# Patient Record
Sex: FEMALE | Race: WHITE | NOT HISPANIC OR LATINO | Employment: OTHER | URBAN - METROPOLITAN AREA
[De-identification: names, ages, dates, MRNs, and addresses within clinical notes are randomized per-mention and may not be internally consistent; named-entity substitution may affect disease eponyms.]

---

## 2017-10-30 PROBLEM — I48.91 ATRIAL FIBRILLATION AND FLUTTER (CMS/HCC): Status: ACTIVE | Noted: 2017-10-30

## 2017-10-30 PROBLEM — I63.9 STROKE (CMS/HCC): Status: ACTIVE | Noted: 2017-10-30

## 2017-10-30 PROBLEM — I73.9 PVD (PERIPHERAL VASCULAR DISEASE) (CMS/HCC): Status: ACTIVE | Noted: 2017-10-30

## 2017-10-30 PROBLEM — I10 HYPERTENSION: Status: ACTIVE | Noted: 2017-10-30

## 2017-10-30 PROBLEM — Z72.0 TOBACCO USE: Status: ACTIVE | Noted: 2017-10-30

## 2017-10-30 PROBLEM — I48.92 ATRIAL FIBRILLATION AND FLUTTER (CMS/HCC): Status: ACTIVE | Noted: 2017-10-30

## 2018-01-04 ENCOUNTER — ANCILLARY PROCEDURE (OUTPATIENT)
Dept: RADIOLOGY | Facility: URGENT CARE | Age: 78
End: 2018-01-04
Payer: MEDICARE

## 2018-01-04 ENCOUNTER — OFFICE VISIT (OUTPATIENT)
Dept: URGENT CARE | Facility: URGENT CARE | Age: 78
End: 2018-01-04
Payer: MEDICARE

## 2018-01-04 VITALS
WEIGHT: 172.5 LBS | OXYGEN SATURATION: 98 % | HEART RATE: 64 BPM | SYSTOLIC BLOOD PRESSURE: 144 MMHG | HEIGHT: 62 IN | RESPIRATION RATE: 16 BRPM | BODY MASS INDEX: 31.74 KG/M2 | DIASTOLIC BLOOD PRESSURE: 90 MMHG

## 2018-01-04 DIAGNOSIS — M54.6 ACUTE LEFT-SIDED THORACIC BACK PAIN: Primary | ICD-10-CM

## 2018-01-04 PROCEDURE — 99202 OFFICE O/P NEW SF 15 MIN: CPT | Mod: PO | Performed by: PHYSICIAN ASSISTANT

## 2018-01-04 PROCEDURE — 99202 OFFICE O/P NEW SF 15 MIN: CPT | Performed by: PHYSICIAN ASSISTANT

## 2018-01-04 PROCEDURE — 72072 X-RAY EXAM THORAC SPINE 3VWS: CPT | Mod: 59

## 2018-01-04 PROCEDURE — 71101 X-RAY EXAM UNILAT RIBS/CHEST: CPT | Mod: LT,PO,FY

## 2018-01-04 RX ORDER — DEXLANSOPRAZOLE 30 MG/1
30 CAPSULE, DELAYED RELEASE ORAL DAILY
COMMUNITY
Start: 2017-06-22 | End: 2020-05-15 | Stop reason: ALTCHOICE

## 2018-01-04 RX ORDER — METOPROLOL SUCCINATE 50 MG/1
50 TABLET, EXTENDED RELEASE ORAL
Refills: 3 | COMMUNITY
Start: 2017-12-08 | End: 2018-03-27 | Stop reason: ALTCHOICE

## 2018-01-04 RX ORDER — CLOPIDOGREL BISULFATE 75 MG/1
TABLET ORAL EVERY 24 HOURS
COMMUNITY
Start: 2017-06-22 | End: 2018-03-26

## 2018-01-04 ASSESSMENT — PAIN SCALES - GENERAL: PAINLEVEL: 8

## 2018-01-04 NOTE — PROGRESS NOTES
"Subjective      HPI    Belkis Burris is a 77 y.o. female who presents for left thoracic back pain after falling on a step 2 days ago.  She reports she slipped while on the step and fell backwards against another step.  She denies feeling dizzy, lightheaded, or hitting her head.  She denies any loss of consciousness.  The pain is located just below the inferior angle of her left scapula.  She has been using ibuprofen with relief.  She denies any shortness of breath or pain when taking a deep breath.      Review of Systems    As noted in HPI.      Objective   /90 (BP Location: Left arm, Patient Position: Sitting, Cuff Size: Reg)   Pulse 64   Resp 16   Ht 1.562 m (5' 1.5\")   Wt 78.2 kg (172 lb 8 oz)   SpO2 98%   BMI 32.07 kg/m²     Physical Exam   Constitutional: She is oriented to person, place, and time. She appears well-developed and well-nourished. No distress.   HENT:   Head: Normocephalic and atraumatic.   Eyes: Pupils are equal, round, and reactive to light.   Neck: Normal range of motion.   Musculoskeletal: Normal range of motion.        Thoracic back: She exhibits tenderness and pain. She exhibits normal range of motion, no swelling, no edema, no laceration and no spasm.   Neurological: She is alert and oriented to person, place, and time. She has normal strength. No sensory deficit.   Skin: Skin is warm and dry. Capillary refill takes less than 2 seconds. No bruising and no rash noted. She is not diaphoretic.   Psychiatric: She has a normal mood and affect. Her behavior is normal. Judgment and thought content normal.       No results found for this or any previous visit (from the past 4 hour(s)).    X-ray Ribs With Pa Chest Left    Result Date: 1/4/2018  Exam: Right rib series with single view chest from 01/04/2018    Clinical History: Acute left-sided thoracic back pain; Patient fell backwards onto his step pain left paraspinous at approximately rib 9. Procedure/Views: Right ribs 2 views with " single view chest Comparison(s): None Findings: A marker is placed in the region of interest. No definite displaced fracture. Mild cardiomegaly. No pneumothorax. Opacity at the left costophrenic sulcus, which may represent atelectasis.     No definite displaced fracture.    X-ray Thoracic Spine 3 Views    Result Date: 1/4/2018  Thoracic spine series 3 views from 01/04/2018. Clinical history: Acute left-sided thoracic back pain; Patient fell backwards onto his step pain left paraspinous at approximately rib 9.    Comparison: A chest x-ray from 5/30/2017 Findings: The bones appear osteopenic. No thoracic vertebral body fractures are identified. There is a sharp scoliotic spine deformity of the thoracic spine convex rightward of the thoracic inlet with a compensatory convex leftward spinal curvature. Left-sided vascular stent is seen in the mid neck. No significant arthritis. Soft tissues appear grossly normal.     1. Advanced scoliotic deformity of the upper thoracic spine. No definite acute fracture seen.        Assessment/Plan   Diagnoses and all orders for this visit:    Acute left-sided thoracic back pain  -     X-ray ribs with PA chest left  -     X-ray thoracic spine 3 views        Discussion:   Will order a chest x-ray with left rib detail as well as a thoracic spine x-ray.  Negative x-rays were discussed with patient as well.  Will have her discontinue the ibuprofen as she is on blood thinners.  Will have her use Tylenol, ice, and heat as needed for pain.  Expected course of this diagnosis discussed with pt. Pt will f/u in clinic prn persisting or worsening symptoms.  Pt verbalizes understanding and agrees with plan.       WILDER ALBARRAN

## 2018-01-06 ENCOUNTER — OFFICE VISIT (OUTPATIENT)
Dept: URGENT CARE | Facility: URGENT CARE | Age: 78
End: 2018-01-06
Payer: MEDICARE

## 2018-01-06 ENCOUNTER — TELEPHONE (OUTPATIENT)
Dept: URGENT CARE | Facility: URGENT CARE | Age: 78
End: 2018-01-06

## 2018-01-06 VITALS
DIASTOLIC BLOOD PRESSURE: 88 MMHG | HEART RATE: 60 BPM | SYSTOLIC BLOOD PRESSURE: 138 MMHG | RESPIRATION RATE: 16 BRPM | OXYGEN SATURATION: 95 % | TEMPERATURE: 97.4 F

## 2018-01-06 DIAGNOSIS — M54.6 THORACIC BACK PAIN, UNSPECIFIED BACK PAIN LATERALITY, UNSPECIFIED CHRONICITY: Primary | ICD-10-CM

## 2018-01-06 PROCEDURE — 99213 OFFICE O/P EST LOW 20 MIN: CPT | Mod: NC | Performed by: FAMILY MEDICINE

## 2018-01-06 RX ORDER — CYCLOBENZAPRINE HCL 5 MG
5 TABLET ORAL 3 TIMES DAILY PRN
Qty: 30 TABLET | Refills: 0 | Status: SHIPPED | OUTPATIENT
Start: 2018-01-06 | End: 2018-03-26

## 2018-01-06 ASSESSMENT — PAIN SCALES - GENERAL: PAINLEVEL: 10-WORST PAIN EVER

## 2018-01-06 NOTE — PROGRESS NOTES
Subjective      Belkis Burris is a 77 y.o. female who presents for back pain from recent fall..    HPI  Patient was recently seen and evaluated for possible rib fractures.  The patient's pain is on the left side but the report patient read indicated that the imaging was on the right side.  They are presenting for reevaluation.  Pain persists but nothing new from prior evaluation.  The following have been reviewed and updated as appropriate in this visit:  No text in SmartText        Review of Systems  Constitutional: Negative for activity change, fatigue and fever.   HENT: Negative for congestion.    Respiratory: Negative for cough and shortness of breath.    Gastrointestinal: Negative for constipation, diarrhea and nausea.   Psychiatric/Behavioral: Negative for agitation.     Objective   /88   Pulse 60   Temp 36.3 °C (97.4 °F) (Temporal)   Resp 16   SpO2 95%     Physical Exam  Constitutional: Patient is oriented to person, place, and time. Patient appears well-developed and well-nourished. Musculoskeletal: Normal range of motion.   Neurological: Patient is alert and oriented to person, place, and time.   Musculoskeletal: Patient is ambulating without pain.  She showed me her back with x-rays along the thoracic paraspinal muscles were the majority of the pain is.  Skin: Skin is warm and dry.   Psychiatric: Patient has a normal mood and affect. normal.   Assessment/Plan   Diagnoses and all orders for this visit:    Thoracic back pain, unspecified back pain laterality, unspecified chronicity  -     cyclobenzaprine (FLEXERIL) 5 mg tablet; Take 1 tablet (5 mg total) by mouth 3 (three) times a day as needed for muscle spasms for up to 10 days.  -     PT eval and treat      Medical decision making: In regards to the x-rays radiology technician and myself visited with the patient apologized for the mislabeled x-rays.  However the left side was imaged this was confirmed both from a radiographic standpoint and  from an order standpoint.  I reviewed the imaging with the patient as well as the radiologist report.  At this time we will start the patient on cyclobenzaprine and physical therapy for the pain.  I stated this will be a no charge visit as the reason for presentation was secondary to a mislabeled x-ray.  RAGHAV MARTINS MD

## 2018-01-06 NOTE — TELEPHONE ENCOUNTER
"Pt's daughter called reporting Belkis is worsening, reports she has been having more pain with all movement and popping. Daughter reports there is a lot a of \"popping\" they can hear with movement. Directed to RTC for reevaluation. Expressed understanding and agreement with POC.   "

## 2018-02-02 DIAGNOSIS — I48.91 ATRIAL FIBRILLATION, UNSPECIFIED TYPE (CMS/HCC): Primary | ICD-10-CM

## 2018-02-05 ENCOUNTER — ANCILLARY PROCEDURE (OUTPATIENT)
Dept: CARDIOLOGY | Facility: CLINIC | Age: 78
End: 2018-02-05
Payer: MEDICARE

## 2018-02-05 DIAGNOSIS — I48.91 ATRIAL FIBRILLATION, UNSPECIFIED TYPE (CMS/HCC): ICD-10-CM

## 2018-02-05 PROCEDURE — 93225 XTRNL ECG REC<48 HRS REC: CPT | Mod: PO

## 2018-02-05 PROCEDURE — 93227 XTRNL ECG REC<48 HR R&I: CPT | Performed by: INTERNAL MEDICINE

## 2018-03-14 ENCOUNTER — OFFICE VISIT (OUTPATIENT)
Dept: CARDIOLOGY | Facility: CLINIC | Age: 78
End: 2018-03-14
Payer: MEDICARE

## 2018-03-14 VITALS
DIASTOLIC BLOOD PRESSURE: 80 MMHG | SYSTOLIC BLOOD PRESSURE: 130 MMHG | BODY MASS INDEX: 32.24 KG/M2 | RESPIRATION RATE: 20 BRPM | HEIGHT: 62 IN | WEIGHT: 175.2 LBS

## 2018-03-14 DIAGNOSIS — I48.91 ATRIAL FIBRILLATION AND FLUTTER (CMS/HCC): Primary | ICD-10-CM

## 2018-03-14 DIAGNOSIS — R53.82 CHRONIC FATIGUE: ICD-10-CM

## 2018-03-14 DIAGNOSIS — I48.92 ATRIAL FIBRILLATION AND FLUTTER (CMS/HCC): Primary | ICD-10-CM

## 2018-03-14 DIAGNOSIS — I10 ESSENTIAL HYPERTENSION: ICD-10-CM

## 2018-03-14 DIAGNOSIS — I73.9 PVD (PERIPHERAL VASCULAR DISEASE) (CMS/HCC): ICD-10-CM

## 2018-03-14 PROCEDURE — 93010 ELECTROCARDIOGRAM REPORT: CPT | Performed by: INTERNAL MEDICINE

## 2018-03-14 PROCEDURE — 99214 OFFICE O/P EST MOD 30 MIN: CPT | Mod: PO | Performed by: NURSE PRACTITIONER

## 2018-03-14 PROCEDURE — 93005 ELECTROCARDIOGRAM TRACING: CPT | Mod: PO | Performed by: NURSE PRACTITIONER

## 2018-03-14 PROCEDURE — 99214 OFFICE O/P EST MOD 30 MIN: CPT | Mod: 25 | Performed by: NURSE PRACTITIONER

## 2018-03-14 ASSESSMENT — ENCOUNTER SYMPTOMS
WHEEZING: 0
PALPITATIONS: 0
ENDOCRINE NEGATIVE: 1
SYNCOPE: 0
ORTHOPNEA: 0
HEMATURIA: 0
CONSTIPATION: 0
MYALGIAS: 0
DYSPNEA ON EXERTION: 0
HEADACHES: 0
NEAR-SYNCOPE: 0
PSYCHIATRIC NEGATIVE: 1
SNORING: 1
FALLS: 1
FEVER: 0
EYES NEGATIVE: 1
PND: 0
LIGHT-HEADEDNESS: 0
VOMITING: 0
SHORTNESS OF BREATH: 0
DIARRHEA: 0
DIZZINESS: 0
DIAPHORESIS: 0
IRREGULAR HEARTBEAT: 0
NAUSEA: 0
CHILLS: 0
HEARTBURN: 0
HEMATOCHEZIA: 0
BRUISES/BLEEDS EASILY: 0
COUGH: 0
ABDOMINAL PAIN: 0

## 2018-03-14 ASSESSMENT — PAIN SCALES - GENERAL: PAINLEVEL: 0-NO PAIN

## 2018-03-14 NOTE — PROGRESS NOTES
[facility]  [address] [place] [id number]    Electrophysiology Outpatient Follow-up Note    Subjective     Chief Complaint  Chief Complaint   Patient presents with   • Atrial Fibrillation       HPI  Belkis Burris is a 77 y.o. female presenting for follow-up of atrial fibrillation.  She is accompanied by her daughter.  The patient's arrhythmia is asymptomatic.  Patient has atrial fibrillation going back at least a year.  She has been unaware of her atrial fibrillation.  She presented with a thrombus in her right leg and it was felt that this was probably from her atrial fibrillation.  She was stented at the time and started on anticoagulation.  She was then found to have an internal carotid artery stenosis.  She had stenting performed in 7/2017 and subsequently he had a stroke afterwards.      Patient was last seen in the EP clinic on 10/18/2017 at which time her metoprolol was increased to 50 mg daily for better rate control.  A 48 hour Holter monitor was recommended prior to her next visit.  Holter monitor showed atrial fibrillation throughout the entire recording period.  Patient has relatively controlled rates with the average heart rate 96 bpm.  Her [id number]-[id number] score is 7 and HAS-BLED score is 4.  Patient is anticoagulated on Eliquis.    Patient currently denies any chest pain, dyspnea, lightheadedness, dizziness, palpitations, presyncopal or syncopal episodes, or edema.  Her only complaint today is chronic fatigue.  Patient has gained weight since the last visit which she attributes to stopping smoking.  She exercises routinely by walking at the mall.  She had a recent mechanical fall due to snow and is being treated for back pain.  Denies signs or symptoms of bleeding.  No PND or orthopnea.  She has a history of snoring.  Twelve-lead EKG today shows atrial fibrillation with heart rate 92 bpm, QRS duration 78 ms, and QTcB 432 ms.      Past Medical History:   Diagnosis  Date   • A-fib (CMS/[facility])    • DVT (deep venous thrombosis) (CMS/[facility])    • Hypercholesteremia        Past Surgical History:   Procedure Laterality Date   • CAROTID STENT     • TUBAL LIGATION         No specialty comments available.    Allergies as of 03/14/2018 - Reviewed 03/14/2018   Allergen Reaction Noted   • Amoxicillin  05/26/2017   • Metronidazole  05/26/2017   • Sulfa (sulfonamide antibiotics)  05/26/2017       Current Outpatient Prescriptions   Medication Sig Dispense Refill   • apixaban (ELIQUIS) 5 mg tablet every 12 hours.     • clopidogrel (PLAVIX) 75 mg tablet daily.     • dexlansoprazole (DEXILANT) 30 mg capsule daily.     • TOPROL XL 50 mg 24 hr tablet Take 50 mg by mouth 2 (two) times a day.   3   • cyclobenzaprine (FLEXERIL) 5 mg tablet Take 1 tablet (5 mg total) by mouth 3 (three) times a day as needed for muscle spasms for up to 10 days. 30 tablet 0     No current facility-administered medications for this visit.        Family History   Problem Relation Age of Onset   • Other Mother      Complications of TB and emphysema, Cause of Death   • Blood Clots Father      Cause of Death   • Other Sister      Carotid endarterectomy   • Other Brother      Carotid endarterectomy       Social History   Substance Use Topics   • Smoking status: Former Smoker     Packs/day: 0.50     Years: 10.00     Types: Cigarettes   • Smokeless tobacco: Never Used   • Alcohol use Yes      Comment: Wine, 3 glasses consumed weekly       Review of Systems  Review of Systems   Constitution: Positive for malaise/fatigue. Negative for chills, diaphoresis and fever.   HENT: Negative for nosebleeds.    Eyes: Negative.    Cardiovascular: Negative for chest pain, dyspnea on exertion, irregular heartbeat, leg swelling, near-syncope, orthopnea, palpitations, paroxysmal nocturnal dyspnea and syncope.   Respiratory: Positive for snoring. Negative for cough, shortness of breath and wheezing.    Endocrine: Negative.    Hematologic/Lymphatic:  Does not bruise/bleed easily.   Skin: Negative.    Musculoskeletal: Positive for falls. Negative for muscle weakness and myalgias.   Gastrointestinal: Negative for abdominal pain, constipation, diarrhea, dysphagia, heartburn, hematochezia, nausea and vomiting.   Genitourinary: Negative for hematuria.   Neurological: Negative for dizziness, headaches and light-headedness.   Psychiatric/Behavioral: Negative.        Objective     Vitals:    03/14/18 1556   BP: 130/80   Resp: 20     Weight: 79.5 kg (175 lb 3.2 oz)    Physical Exam   Constitutional: She is oriented to person, place, and time. Vital signs are normal. She appears well-developed and well-nourished. No distress.   HENT:   Head: Normocephalic.   Nose: No epistaxis.   Neck: No JVD present. Carotid bruit is not present.   Cardiovascular: Normal rate, normal heart sounds and intact distal pulses.  An irregularly irregular rhythm present.   No murmur heard.  Pulmonary/Chest: Effort normal and breath sounds normal. She has no wheezes. She has no rales.   Abdominal: Soft. Bowel sounds are normal.   Musculoskeletal: She exhibits no edema.   Neurological: She is alert and oriented to person, place, and time.   Skin: Skin is warm and dry. She is not diaphoretic.   Psychiatric: She has a normal mood and affect. Her behavior is normal.   Vitals reviewed.      Data Review:   Lab Results   Component Value Date     07/12/2017    K 3.9 07/12/2017     (H) 07/12/2017    CO2 21 07/12/2017    BUN 15 07/12/2017    CREATININE 0.8 07/12/2017    GLUCOSE 98 07/12/2017    CALCIUM 8.2 (L) 07/12/2017     Lab Results   Component Value Date    WBC 8.1 07/12/2017    HGB 10.5 (L) 07/12/2017    HCT 30.3 (L) 07/12/2017    MCV 87.4 07/12/2017     07/12/2017     Lab Results   Component Value Date    CHOL 155 05/28/2017    TRIG 153 (H) 05/28/2017    HDL 28 (L) 05/28/2017     Lab Results   Component Value Date    TSH 0.740 06/24/2016       Assessment/Plan   Diagnoses and all  orders for this visit:    Atrial fibrillation and flutter (CMS/[facility])  -     Pulse oximetry overnight study; Future  -     Basic metabolic panel Blood, Venous  -     CBC w/auto differential Blood, Venous    Essential hypertension    Chronic fatigue  -     Magnesium Blood, Venous    PVD (peripheral vascular disease) (CMS/[facility])      Plan  1.  Patient has permanent atrial fibrillation.  Management strategy is rate control.  Heart rate today is 92 bpm and recent Holter monitor showed average heart rate 96 bpm.  Will increase metoprolol to 100 mg daily for better rate control (she prefers to take 50 mg twice daily until her current prescription runs out).   Her [id number]-AEBs score is 7 and HAS-BLED score is 4.  She will require chronic anticoagulation.  Patient is anticoagulated on Eliquis.  Patient is due for routine BMP, magnesium level, and CBC.  She is requesting to have these drawn at her community center next week.  2.  Blood pressure is well controlled today.  Continue current regimen.  3.  Patient complains of chronic fatigue.  She has a history of snoring.  I think she probably has some sleep apnea, so I ordered an overnight pulse oximetry test.  Also, she will have labs drawn next week.  4.  Patient has a history of internal carotid artery stent placement and subsequent stroke.  She has recovered from this.  Continue to follow-up with vascular services.  5.  Return to clinic in 3 months with Sandee Taylor CNP or sooner for new or worsening symptoms.      Return in about 3 months (around 6/14/2018).    Patient Education: Ready to learn, no apparent learning barriers were identified; learning preference includes listening.  Explained diagnosis and treatment plan; patient expressed understanding of the content.    Electronically signed by: Mildred Salomon CNP  3/14/2018  4:41 PM

## 2018-03-26 ENCOUNTER — OFFICE VISIT (OUTPATIENT)
Dept: CARDIOLOGY | Facility: CLINIC | Age: 78
End: 2018-03-26
Payer: MEDICARE

## 2018-03-26 VITALS
SYSTOLIC BLOOD PRESSURE: 124 MMHG | HEIGHT: 62 IN | DIASTOLIC BLOOD PRESSURE: 77 MMHG | OXYGEN SATURATION: 95 % | HEART RATE: 84 BPM | BODY MASS INDEX: 32.02 KG/M2 | WEIGHT: 174 LBS

## 2018-03-26 DIAGNOSIS — I73.9 PAD (PERIPHERAL ARTERY DISEASE) (CMS/HCC): Primary | ICD-10-CM

## 2018-03-26 PROCEDURE — 99214 OFFICE O/P EST MOD 30 MIN: CPT | Performed by: PHYSICIAN ASSISTANT

## 2018-03-26 PROCEDURE — 99214 OFFICE O/P EST MOD 30 MIN: CPT | Mod: PO | Performed by: PHYSICIAN ASSISTANT

## 2018-03-26 RX ORDER — PRAVASTATIN SODIUM 20 MG/1
10 TABLET ORAL DAILY
Qty: 15 TABLET | Refills: 11 | Status: SHIPPED | OUTPATIENT
Start: 2018-03-26 | End: 2018-05-24

## 2018-03-26 RX ORDER — ASPIRIN 81 MG/1
81 TABLET ORAL DAILY
Qty: 30 TABLET | Refills: 11 | Status: SHIPPED | OUTPATIENT
Start: 2018-03-26 | End: 2019-07-24 | Stop reason: HOSPADM

## 2018-03-26 ASSESSMENT — ENCOUNTER SYMPTOMS
DIZZINESS: 0
SHORTNESS OF BREATH: 1
FALLS: 1

## 2018-03-26 ASSESSMENT — PAIN SCALES - GENERAL: PAINLEVEL: 0-NO PAIN

## 2018-03-26 NOTE — PROGRESS NOTES
[facility] [place]  Cardiology Outpatient Progress Note      Subjective      Patient ID: Belkis Burris is a 77 y.o. female.    Chief Complaint:   Chief Complaint   Patient presents with   • PAD Upper     LICA stent, known VIRI occlusion   • Atrial Fibrillation   .      HPI  She reports that she  has been having fatigue ever since increasing the dose of metoprolol at her last EP visit.  Feels sluggish all the time.  Lack of drive.  However denies any palpitations or racing heart.      From a carotid standpoint she denies any TIA or strokelike symptoms.    From a lower extremity vascular standpoint she denies any rest pain.  She does tell me that she does get weak with ambulation.  She is to be able to walk about 2 miles without limitations in her symptoms.  Now she can only walk about 1 mile before she will develop left hip pain and leg fatigue and weakness.  Denies any color changes to lower extremity or ulcerations.    She denies any chest pains, shortness of breath, orthopnea, PND.    Last updated by Patricia Colin LPN on 3/25/18    Follow up (PAD) last office visit with vascular on 8/22/17. Saw Mildred in EP on 3/23/18. Needs carotid duplex and art duplex with ABIs (per your office note on 8/22/17.)      ARRHYTHMIA: A Fib / Flutter    PVD:  1) Stroke of internal carotid artery  2) Acute occlusion R common femoral artery [Mechanical aspiration/thrombectomy] - 5/26/2017  3)LICA stent on 7/11/17, known occluded VIRI - subsequent stroke.     Past Medical History:   Diagnosis Date   • A-fib (CMS/[facility])    • DVT (deep venous thrombosis) (CMS/[facility])    • Hypercholesteremia        Past Surgical History:   Procedure Laterality Date   • CAROTID STENT     • TUBAL LIGATION         Allergies as of 03/26/2018 - Reviewed 03/14/2018   Allergen Reaction Noted   • Amoxicillin  05/26/2017   • Metronidazole  05/26/2017   • Sulfa (sulfonamide antibiotics)  05/26/2017       Current Outpatient Prescriptions   Medication  Sig Dispense Refill   • apixaban (ELIQUIS) 5 mg tablet every 12 hours.     • dexlansoprazole (DEXILANT) 30 mg capsule daily.     • Lactobacillus acidophilus (PROBIOTIC) 10 billion cell capsule Take by mouth.     • TOPROL XL 50 mg 24 hr tablet Take 50 mg by mouth 2 (two) times a day.   3   • aspirin 81 mg EC tablet Take 1 tablet (81 mg total) by mouth daily. 30 tablet 11   • pravastatin (PRAVACHOL) 20 mg tablet Take 0.5 tablets (10 mg total) by mouth daily. 15 tablet 11     No current facility-administered medications for this visit.        Family History   Problem Relation Age of Onset   • Other Mother      Complications of TB and emphysema, Cause of Death   • Blood Clots Father      Cause of Death   • Other Sister      Carotid endarterectomy   • Other Brother      Carotid endarterectomy       Social History   Substance Use Topics   • Smoking status: Former Smoker     Packs/day: 0.50     Years: 10.00     Types: Cigarettes   • Smokeless tobacco: Never Used   • Alcohol use No         Review of Systems  Review of Systems   Constitution: Positive for malaise/fatigue.   Respiratory: Positive for shortness of breath.    Musculoskeletal: Positive for falls.   Genitourinary: Positive for nocturia.   Neurological: Negative for dizziness.     10 point review of systems performed. Positives listed and all others negative.     Objective     Vitals:    03/26/18 1053   BP: 124/77   Pulse: 84   SpO2: 95%     Weight: 78.9 kg (174 lb)    Physical Exam   Constitutional: She is oriented to person, place, and time. She appears well-developed and well-nourished. She is cooperative.   HENT:   Head: Normocephalic and atraumatic.   Eyes: EOM are normal. Pupils are equal, round, and reactive to light.   Neck: Neck supple. No hepatojugular reflux and no JVD present. Carotid bruit is not present. No tracheal deviation present.   Cardiovascular: Normal rate, regular rhythm, S1 normal, S2 normal and normal heart sounds.  Exam reveals no S3, no  S4 and no friction rub.    No murmur heard.  Pulses:       Dorsalis pedis pulses are 2+ on the right side, and 2+ on the left side.        Posterior tibial pulses are 2+ on the right side, and 2+ on the left side.   Pulmonary/Chest: Effort normal and breath sounds normal. No respiratory distress. She has no decreased breath sounds. She has no wheezes. She has no rhonchi. She has no rales.   Abdominal: Soft. She exhibits no distension, no abdominal bruit and no ascites. There is no tenderness.   Musculoskeletal: She exhibits edema.   Neurological: She is alert and oriented to person, place, and time. No cranial nerve deficit.   Skin: Skin is warm and dry. No lesion noted. She is not diaphoretic.   Psychiatric: She has a normal mood and affect. Her speech is normal and behavior is normal.                Lipid:   Lab Results   Component Value Date    CHOL 155 05/28/2017    HDL 28 (L) 05/28/2017    TRIG 153 (H) 05/28/2017    LDLCALC 96 05/28/2017         Assessment/Plan   Diagnoses and all orders for this visit:    PAD (peripheral artery disease) (CMS/[facility])  -     aspirin 81 mg EC tablet; Take 1 tablet (81 mg total) by mouth daily.  -     US Carotid duplex bilateral; Future  -     US Arterial duplex lower extremity bilateral; Future  -     US Ankle / Brachial indices; Future  -     US Arterial duplex lower extremity bilateral; Future  -     US Ankle / Brachial indices; Future  -     US Carotid duplex bilateral; Future  -     pravastatin (PRAVACHOL) 20 mg tablet; Take 0.5 tablets (10 mg total) by mouth daily.  Presentation of acute limb ischemia of the right lower extremity felt to be thrombus embolism from her atrial fibrillation.  Patient is due for surveillance of the right lower extremities to make sure she does not have any arterial obstruction.  Symptoms are concerning for claudication.  Predominantly left hip pain and lower extremity fatigue and weakness bilaterally.  We will get an arterial duplex with  ABIs.  Almost about 1 year since her acute limb ischemia.  Stop Plavix.  Start aspirin 81 mg.  History of statin intolerance.  However is willing to try low-dose pravastatin.  If she cannot tolerate this will need to consider adding Zetia.  She will call the office if she has any myalgias with it.    Carotid artery stenosis  Status post occlusion of her right ICA is chronic.  Status post left ICA stent  Due for surveillance of her carotid artery left ICA stent.    Continue with aspirin, statin indefinitely.    Atrial fibrillation  [id number] score greater than 2  Follows with electrophysiology.  On anticoagulation.  Increasing fatigue since increasing her metoprolol due to the fact that her average heart rate was increased on her Holter monitor.  We will consider decreasing this.  Since electrophysiology manage this I will get in contact with him for further recommendations.        Follow up with Addy Cox PA-C 1 years time.      Electronically signed by: WILDER WAYNE  3/26/2018  12:12 PM

## 2018-03-26 NOTE — Clinical Note
MIA Ignacio, and I saw this patient recently and increase her metoprolol due to the fact that rates on her Holter monitor were elevated still in the setting of her atrial fib.  She has been having some significant fatigue since increasing metoprolol.  Just wondering if you guys wanted to weigh in on decreasing the dose or adding a little calcium channel blocker instead.  If you guys want to call the patient your more than willing to or just give me advice and I can touch base with the patient.  Thank you

## 2018-03-27 DIAGNOSIS — I48.21 PERMANENT ATRIAL FIBRILLATION (CMS/HCC): Primary | ICD-10-CM

## 2018-03-27 RX ORDER — DILTIAZEM HYDROCHLORIDE 180 MG/1
180 CAPSULE, COATED, EXTENDED RELEASE ORAL DAILY
Qty: 90 CAPSULE | Refills: 3 | Status: SHIPPED | OUTPATIENT
Start: 2018-03-27 | End: 2021-07-21 | Stop reason: ALTCHOICE

## 2018-04-25 ENCOUNTER — ANCILLARY PROCEDURE (OUTPATIENT)
Dept: CARDIOLOGY | Facility: CLINIC | Age: 78
End: 2018-04-25
Payer: MEDICARE

## 2018-04-25 PROCEDURE — 93922 UPR/L XTREMITY ART 2 LEVELS: CPT | Mod: PO

## 2018-04-25 PROCEDURE — 93925 LOWER EXTREMITY STUDY: CPT | Mod: PO

## 2018-04-25 PROCEDURE — 93925 LOWER EXTREMITY STUDY: CPT | Mod: 26 | Performed by: INTERNAL MEDICINE

## 2018-04-25 PROCEDURE — 93880 EXTRACRANIAL BILAT STUDY: CPT | Mod: PO

## 2018-04-25 PROCEDURE — 93922 UPR/L XTREMITY ART 2 LEVELS: CPT | Mod: 26 | Performed by: INTERNAL MEDICINE

## 2018-04-25 PROCEDURE — 93880 EXTRACRANIAL BILAT STUDY: CPT | Mod: 26 | Performed by: INTERNAL MEDICINE

## 2018-05-07 ENCOUNTER — TELEPHONE (OUTPATIENT)
Dept: CARDIOLOGY | Facility: CLINIC | Age: 78
End: 2018-05-07

## 2018-05-15 ENCOUNTER — TELEPHONE (OUTPATIENT)
Dept: CARDIOLOGY | Facility: CLINIC | Age: 78
End: 2018-05-15

## 2018-05-18 ENCOUNTER — TELEPHONE (OUTPATIENT)
Dept: CARDIOLOGY | Facility: CLINIC | Age: 78
End: 2018-05-18

## 2018-05-18 NOTE — TELEPHONE ENCOUNTER
"Received call from patient-patient had questions r/t what her PCP ordered for her chol and she was unable to tolerate that medication- so Addy was going to order her a statin and he didn't order it. Patient also had question r/t her ultra sounds she had done and wanting to know the results and a question r/t to US on carotid. Patient instructed to call back at [facility] [phone] and ask to speak with \"Patricia\" Addy Cox's nurse. Patient agreed.  "

## 2018-05-24 ENCOUNTER — TELEPHONE (OUTPATIENT)
Dept: CARDIOLOGY | Facility: CLINIC | Age: 78
End: 2018-05-24

## 2018-05-24 DIAGNOSIS — I73.9 PAD (PERIPHERAL ARTERY DISEASE) (CMS/HCC): Primary | ICD-10-CM

## 2018-05-24 RX ORDER — EZETIMIBE 10 MG/1
10 TABLET ORAL DAILY
Qty: 30 TABLET | Refills: 11 | Status: SHIPPED | OUTPATIENT
Start: 2018-05-24 | End: 2019-07-24 | Stop reason: HOSPADM

## 2018-05-24 NOTE — TELEPHONE ENCOUNTER
Patient called to change from Pravastatin to Zetia, as disgussed in the office visit with Addy Cox PA-C. Per Addy, stop pravastatin, start Zetia 10 mg daily. Patient notified.

## 2018-10-15 ENCOUNTER — TELEPHONE (OUTPATIENT)
Dept: CARDIOLOGY | Facility: CLINIC | Age: 78
End: 2018-10-15

## 2018-10-15 NOTE — TELEPHONE ENCOUNTER
"Patient's daughter, Bee, calls.  She states her mother was \"gray, clammy and weak\" yesterday.  She refused to go to ED.  She states she is a little better today but wonders if she can be seen here.  I instructed her that we are not an acute care clinic.  Patient should be seen at Urgent Care, ED or PCP.  She verbalizes understanding and agrees.  "

## 2018-11-30 ENCOUNTER — CLINICAL SUPPORT (OUTPATIENT)
Dept: RESEARCH | Facility: OTHER | Age: 78
End: 2018-11-30
Payer: MEDICARE

## 2018-11-30 VITALS
HEART RATE: 72 BPM | SYSTOLIC BLOOD PRESSURE: 129 MMHG | HEIGHT: 62 IN | BODY MASS INDEX: 32.07 KG/M2 | DIASTOLIC BLOOD PRESSURE: 68 MMHG | WEIGHT: 174.3 LBS

## 2018-11-30 DIAGNOSIS — Z00.6 RESEARCH SUBJECT: Primary | ICD-10-CM

## 2018-11-30 PROCEDURE — 3700: Mod: RPD | Performed by: NURSE PRACTITIONER

## 2018-12-03 LAB
EXTERNAL ALANINE AMINOTRANSFERASE (SGPT) (U/L) IN SER/PLAS: 24 U/L
EXTERNAL ALBUMIN (G/DL) IN SER/PLAS: 4.3 G/DL
EXTERNAL ALKALINE PHOSPHATASE (U/L) IN SER/PLAS: 93 U/L
EXTERNAL ASPARTATE AMINOTRANSFERASE (SGOT) (U/L) IN SER/PLAS: 20 U/L
EXTERNAL BASOPHILS %: 0.3 %
EXTERNAL BASOPHILS ABSOLUTE: 0.02 10*3/UL
EXTERNAL BILIRUBIN TOTAL (MG/DL) IN SER/PLAS: 0.4 MG/DL
EXTERNAL CALCIUM (MG/DL) IN SER/PLAS: 9.2 MG/DL
EXTERNAL CHLORIDE (MMOL/L) IN SER/PLAS: 100 MMOL/L
EXTERNAL CHOLESTEROL (MG/DL) IN SER/PLAS: 270 MG/DL
EXTERNAL CHOLESTEROL IN HDL (MG/DL) IN SER/PLAS: 45 MG/DL
EXTERNAL CHOLESTEROL IN LDL (MG/DL) IN SERUM OR PLASMA BY CALCULATION: 201 MG/DL
EXTERNAL CREATININE (MG/DL) IN SER/PLAS: 0.91 MG/DL
EXTERNAL EOSINOPHILS %: 1.8 %
EXTERNAL EOSINOPHILS ABSOLUTE: 0.14 10*3/UL
EXTERNAL GLOMERULAR FILTRATION RATE ML/MIN/1.73 SQ M.PREDICTED: 60 ML/MIN/1.73M*2
EXTERNAL GLUCOSE (MG/DL) IN SER/PLAS: 109 MG/DL
EXTERNAL HEMATOCRIT: 43.4 %
EXTERNAL HEMOGLOBIN (BASE NAME HGB): 14.2 G/DL
EXTERNAL LEUKOCYTES(10*3/UL) IN BLOOD BY AUTOMATED COUNT: 8 10*3/ML
EXTERNAL MCH: 29 PG
EXTERNAL MCHC: 33 G/DL
EXTERNAL MCV: 90 FL
EXTERNAL MONOCYTES ABSOLUTE: 0.42 10*3/UL
EXTERNAL NEUTROPHILS %: 73.7 %
EXTERNAL NEUTROPHILS (10*3/UL) IN BLOOD BY AUTOMATED COUNT: 5.9 10*3/UL
EXTERNAL PLATELET COUNT: 300 10*3/UL
EXTERNAL POTASSIUM (MMOL/L) IN SER/PLAS: 4.6 MMOL/L
EXTERNAL PROTEIN (G/DL) IN SER/PLAS: 6.5 G/DL
EXTERNAL RBC: 4.9 10*6/ΜL
EXTERNAL SODIUM (MMOL/L) IN SER/PLAS: 139 MMOL/L
EXTERNAL THYROID STIMULATING HORMONE: 1.11 UIU/ML
EXTERNAL TRIGLYCERIDE (MG/DL) IN SER/PLAS: 122 MG/DL
EXTERNAL UREA NITROGEN (MG/DL) IN SER/PLAS: 16 MG/DL
HBA1C MFR BLD: 6 %

## 2018-12-06 ENCOUNTER — CLINICAL SUPPORT (OUTPATIENT)
Dept: RESEARCH | Facility: OTHER | Age: 78
End: 2018-12-06
Payer: MEDICARE

## 2018-12-06 VITALS — DIASTOLIC BLOOD PRESSURE: 79 MMHG | RESPIRATION RATE: 16 BRPM | HEART RATE: 79 BPM | SYSTOLIC BLOOD PRESSURE: 129 MMHG

## 2018-12-06 DIAGNOSIS — Z00.6 RESEARCH SUBJECT: Primary | ICD-10-CM

## 2018-12-06 PROCEDURE — 3700: Mod: RPD | Performed by: NURSE PRACTITIONER

## 2018-12-06 NOTE — RESEARCH NOTE
A Randomized, Double-blind, Placebo-controlled Study to Assess the Effects of Bempedoic Acid (ETC-1002) on the Occurrence of Major Cardiovascular Events in Patients with, or at high risk for, Cardiovascular Disease who are Statin Intolerant    Bempedoic acid is a first-in-class small molecule inhibitor of adenosine triphosphate (ATP) - citrate lyase (ACL), an enzyme upstream of HMG-CoA reductase (molecular target of statins) in the cholesterol biosynthesis pathway. Bempedoic acid decreases cholesterol synthesis in liver leading to increased LDL receptor (LDLR) expression and LDL particle clearance from the blood. Therefore, inhibition of ACL by [id number] reduces LDL-C via the same pathway as HMG-CoA reductase inhibition by statins.    An important differentiating feature of bempedoic acid is that, unlike statins, it does not inhibit cholesterol synthesis in skeletal muscle. Therefore, it is not expected to cause the adverse effects associated with inhibition of the cholesterol biosynthesis pathway in skeletal muscle.     The primary objective of this trial is to evaluate whether long-term treatment with bempedoic acid 180 mg/day versus placebo reduces the risk of major adverse cardiovascular events (MACE) in patients with, or at high risk for, cardiovascular disease (CVD) who are statin intolerant. This superiority study will test the hypothesis that bempedoic acid, compared with placebo, will reduce the risk of CV events in patients with, or at high risk for, CVD who are statin intolerant.    It is estimated the mean treatment duration will be approximately 42 months (3.5 years), with all patients remaining in the study for a minimum of 24 months (2 years) and some patients remaining in the study for up to approximately 57.5 months (4.75 years).    If you have questions regarding this study or this subject's participation, call [facility] Clinical Research at [phone] between the hours of 8am-430pm. For  emergency information outside of these hours, call KAMARI Lew, CNP (Director of Research) at [phone] or Ita Adame MS (Manager of Research) at [phone].    VISIT SUMMARY:    Belkis was in today for a Fasting LDL lab re-draw  for the CLEAR trial.  She will be scheduled to come back next week for Visit S2 if the LDL re-draw is qualifying.    Vital signs were taken.  Fasting Labs were collected without difficulty and sent to Central Lab for processing.  Patient was thanked for participating in Research.

## 2018-12-10 LAB
EXTERNAL CHOLESTEROL (MG/DL) IN SER/PLAS: 256 MG/DL
EXTERNAL CHOLESTEROL IN HDL (MG/DL) IN SER/PLAS: 46 MG/DL
EXTERNAL CHOLESTEROL IN LDL (MG/DL) IN SERUM OR PLASMA BY CALCULATION: 184 MG/DL
EXTERNAL TRIGLYCERIDE (MG/DL) IN SER/PLAS: 132 MG/DL

## 2018-12-11 ENCOUNTER — CLINICAL SUPPORT (OUTPATIENT)
Dept: RESEARCH | Facility: OTHER | Age: 78
End: 2018-12-11
Payer: MEDICARE

## 2018-12-11 VITALS
WEIGHT: 176.8 LBS | RESPIRATION RATE: 16 BRPM | HEART RATE: 62 BPM | SYSTOLIC BLOOD PRESSURE: 136 MMHG | DIASTOLIC BLOOD PRESSURE: 76 MMHG | BODY MASS INDEX: 32.87 KG/M2

## 2018-12-11 DIAGNOSIS — Z00.6 RESEARCH SUBJECT: Primary | ICD-10-CM

## 2018-12-11 PROCEDURE — 3700: Mod: RPD | Performed by: NURSE PRACTITIONER

## 2018-12-11 RX ORDER — SIMVASTATIN 10 MG/1
180 TABLET, FILM COATED ORAL DAILY
Qty: 35 TABLET | Refills: 0 | Status: SHIPPED | OUTPATIENT
Start: 2018-12-11 | End: 2019-10-01

## 2018-12-11 RX ORDER — SIMVASTATIN 10 MG/1
180 TABLET, FILM COATED ORAL DAILY
COMMUNITY
End: 2018-12-11

## 2018-12-11 NOTE — RESEARCH NOTE
A Randomized, Double-blind, Placebo-controlled Study to Assess the Effects of Bempedoic Acid (ETC-1002) on the Occurrence of Major Cardiovascular Events in Patients with, or at high risk for, Cardiovascular Disease who are Statin Intolerant    Bempedoic acid is a first-in-class small molecule inhibitor of adenosine triphosphate (ATP) - citrate lyase (ACL), an enzyme upstream of HMG-CoA reductase (molecular target of statins) in the cholesterol biosynthesis pathway. Bempedoic acid decreases cholesterol synthesis in liver leading to increased LDL receptor (LDLR) expression and LDL particle clearance from the blood. Therefore, inhibition of ACL by [id number] reduces LDL-C via the same pathway as HMG-CoA reductase inhibition by statins.    An important differentiating feature of bempedoic acid is that, unlike statins, it does not inhibit cholesterol synthesis in skeletal muscle. Therefore, it is not expected to cause the adverse effects associated with inhibition of the cholesterol biosynthesis pathway in skeletal muscle.     The primary objective of this trial is to evaluate whether long-term treatment with bempedoic acid 180 mg/day versus placebo reduces the risk of major adverse cardiovascular events (MACE) in patients with, or at high risk for, cardiovascular disease (CVD) who are statin intolerant. This superiority study will test the hypothesis that bempedoic acid, compared with placebo, will reduce the risk of CV events in patients with, or at high risk for, CVD who are statin intolerant.    It is estimated the mean treatment duration will be approximately 42 months (3.5 years), with all patients remaining in the study for a minimum of 24 months (2 years) and some patients remaining in the study for up to approximately 57.5 months (4.75 years).    If you have questions regarding this study or this subject's participation, call [facility] Clinical Research at [phone] between the hours of 8am-430pm. For  emergency information outside of these hours, call KAMARI Lew CNP (Director of Research) at [phone] or Ita Adame MS (Manager of Research) at [phone].    VISIT SUMMARY:    Belkis was in today for Visit S2 for the CLEAR trial.  All required procedures were completed.   She has no medication changes and no events to report.  Vitals signs, labs and ECG were obtained without complications.   A PE was performed by ÁNGELA Lewis CNP.  The patient met inclusion criteria and was dispensed 35 tabs of study drug.  The subject was instructed to take one tab once daily at a similar time with or without food.   Patient confirmed understanding of dosing and storage instructions.  First dose of study drug was given today in clinic.   Her next visit has been scheduled for 1 month.  She was thanked for her participation in research.    EVENTS:    Adverse Events:  None  Serious Adverse Events:  None  Outcome Events:  None    LABS:    Fasting Labs were collected and sent to [facility] for processing.  Results will be posted in patient’s chart when available.

## 2018-12-13 LAB
EXTERNAL CHOLESTEROL (MG/DL) IN SER/PLAS: 252 MG/DL
EXTERNAL CHOLESTEROL IN HDL (MG/DL) IN SER/PLAS: 47 MG/DL
EXTERNAL CHOLESTEROL IN LDL (MG/DL) IN SERUM OR PLASMA BY CALCULATION: 179 MG/DL
EXTERNAL TRIGLYCERIDE (MG/DL) IN SER/PLAS: 129 MG/DL

## 2019-01-08 ENCOUNTER — CLINICAL SUPPORT (OUTPATIENT)
Dept: RESEARCH | Facility: OTHER | Age: 79
End: 2019-01-08
Payer: MEDICARE

## 2019-01-08 VITALS
DIASTOLIC BLOOD PRESSURE: 71 MMHG | HEART RATE: 65 BPM | BODY MASS INDEX: 32.62 KG/M2 | WEIGHT: 175.5 LBS | SYSTOLIC BLOOD PRESSURE: 136 MMHG

## 2019-01-08 DIAGNOSIS — Z00.6 RESEARCH SUBJECT: Primary | ICD-10-CM

## 2019-01-08 PROCEDURE — 3700: Mod: RPD | Performed by: NURSE PRACTITIONER

## 2019-01-08 NOTE — RESEARCH NOTE
A Randomized, Double-blind, Placebo-controlled Study to Assess the Effects of Bempedoic Acid (ETC-1002) on the Occurrence of Major Cardiovascular Events in Patients with, or at high risk for, Cardiovascular Disease who are Statin Intolerant    Bempedoic acid is a first-in-class small molecule inhibitor of adenosine triphosphate (ATP) - citrate lyase (ACL), an enzyme upstream of HMG-CoA reductase (molecular target of statins) in the cholesterol biosynthesis pathway. Bempedoic acid decreases cholesterol synthesis in liver leading to increased LDL receptor (LDLR) expression and LDL particle clearance from the blood. Therefore, inhibition of ACL by [id number] reduces LDL-C via the same pathway as HMG-CoA reductase inhibition by statins.    An important differentiating feature of bempedoic acid is that, unlike statins, it does not inhibit cholesterol synthesis in skeletal muscle. Therefore, it is not expected to cause the adverse effects associated with inhibition of the cholesterol biosynthesis pathway in skeletal muscle.     The primary objective of this trial is to evaluate whether long-term treatment with bempedoic acid 180 mg/day versus placebo reduces the risk of major adverse cardiovascular events (MACE) in patients with, or at high risk for, cardiovascular disease (CVD) who are statin intolerant. This superiority study will test the hypothesis that bempedoic acid, compared with placebo, will reduce the risk of CV events in patients with, or at high risk for, CVD who are statin intolerant.    It is estimated the mean treatment duration will be approximately 42 months (3.5 years), with all patients remaining in the study for a minimum of 24 months (2 years) and some patients remaining in the study for up to approximately 57.5 months (4.75 years).    If you have questions regarding this study or this subject's participation, call [facility] Clinical Research at [phone] between the hours of 8am-430pm. For  emergency information outside of these hours, call KAMARI Lew, CNP (Director of Research) at [phone] or Ita Adame MS (Manager of Research) at [phone].      VISIT SUMMARY:    Belkis was in today Visit T1 (Randomization) for the CLEAR trial.  Patient meets all inclusion and none of the exclusion criteria for the study and therefore was randomized into the trial today.  She is doing well.  No AE’s/MURIEL’s since her last visit.  She was compliant with her IP/Placebo that was dispensed last visit and compliancy was greater than 80%.  All required procedures were completed except she was unable to provide a urine specimen.  IP/Placebo was assigned and dispensed to her.  Belkis took her first dose in the clinic today and was observed for approximately 30 minutes post dose with no side effects.  Study Drug instructions were given and she confirmed understanding.  She was thanked for coming in today and next visit scheduled for 2/5/19 @ 0900.    EVENTS:    Adverse Events:  None  Serious Adverse Events:  None  Outcome Events:  None    LABS:    Fasting Labs were collected and sent to [facility] for processing.  Results will be posted in patient’s chart when available.

## 2019-01-10 LAB
EXTERNAL ALANINE AMINOTRANSFERASE (SGPT) (U/L) IN SER/PLAS: 20 U/L
EXTERNAL ALBUMIN (G/DL) IN SER/PLAS: 4 G/DL
EXTERNAL ALKALINE PHOSPHATASE (U/L) IN SER/PLAS: 81 U/L
EXTERNAL ASPARTATE AMINOTRANSFERASE (SGOT) (U/L) IN SER/PLAS: 16 U/L
EXTERNAL BASOPHILS %: 1.2 %
EXTERNAL BASOPHILS ABSOLUTE: 0.09 10*3/UL
EXTERNAL BILIRUBIN TOTAL (MG/DL) IN SER/PLAS: 0.4 MG/DL
EXTERNAL CALCIUM (MG/DL) IN SER/PLAS: 9.1 MG/DL
EXTERNAL CHLORIDE (MMOL/L) IN SER/PLAS: 102 MMOL/L
EXTERNAL CHOLESTEROL (MG/DL) IN SER/PLAS: 279 MG/DL
EXTERNAL CHOLESTEROL IN HDL (MG/DL) IN SER/PLAS: 46 MG/DL
EXTERNAL CHOLESTEROL IN LDL (MG/DL) IN SERUM OR PLASMA BY CALCULATION: 206 MG/DL
EXTERNAL CREATININE (MG/DL) IN SER/PLAS: 0.83 MG/DL
EXTERNAL EOSINOPHILS %: 2.3 %
EXTERNAL EOSINOPHILS ABSOLUTE: 0.18 10*3/UL
EXTERNAL GLOMERULAR FILTRATION RATE ML/MIN/1.73 SQ M.PREDICTED: 66 ML/MIN/1.73M*2
EXTERNAL GLUCOSE (MG/DL) IN SER/PLAS: 106 MG/DL
EXTERNAL HEMATOCRIT: 42.4 %
EXTERNAL HEMOGLOBIN (BASE NAME HGB): 14.2 G/DL
EXTERNAL LEUKOCYTES(10*3/UL) IN BLOOD BY AUTOMATED COUNT: 7.8 10*3/ML
EXTERNAL MCH: 29 PG
EXTERNAL MCHC: 34 G/DL
EXTERNAL MCV: 88 FL
EXTERNAL MONOCYTES ABSOLUTE: 0.48 10*3/UL
EXTERNAL NEUTROPHILS %: 69.6 %
EXTERNAL NEUTROPHILS (10*3/UL) IN BLOOD BY AUTOMATED COUNT: 5.43 10*3/UL
EXTERNAL PLATELET COUNT: 253 10*3/UL
EXTERNAL POTASSIUM (MMOL/L) IN SER/PLAS: 4.3 MMOL/L
EXTERNAL PROTEIN (G/DL) IN SER/PLAS: 6.5 G/DL
EXTERNAL RBC: 4.8 10*6/ΜL
EXTERNAL SODIUM (MMOL/L) IN SER/PLAS: 139 MMOL/L
EXTERNAL TRIGLYCERIDE (MG/DL) IN SER/PLAS: 137 MG/DL
EXTERNAL UREA NITROGEN (MG/DL) IN SER/PLAS: 15 MG/DL

## 2019-02-05 ENCOUNTER — CLINICAL SUPPORT (OUTPATIENT)
Dept: RESEARCH | Facility: OTHER | Age: 79
End: 2019-02-05
Payer: MEDICARE

## 2019-02-05 VITALS
DIASTOLIC BLOOD PRESSURE: 77 MMHG | SYSTOLIC BLOOD PRESSURE: 160 MMHG | WEIGHT: 178.7 LBS | HEART RATE: 77 BPM | BODY MASS INDEX: 33.22 KG/M2

## 2019-02-05 DIAGNOSIS — Z00.6 RESEARCH SUBJECT: ICD-10-CM

## 2019-02-05 PROCEDURE — 3700: Mod: RPD | Performed by: NURSE PRACTITIONER

## 2019-02-05 NOTE — RESEARCH NOTE
Active Trial: CLEAR    Name: Belkis Burris  : 1940  Age:  78 y.o.  Sex: female    Appt. Location:  CLINICAL RESEARCH   CLINICAL RESEARCH  [address] [place] [id number] [phone]      Trial Information:   Visit Summary:     Belkis was here today to complete her T2/M1 clinic follow up visit. She has been doing very well over the last month since she was randomized into the trial on 2019.     Medications were reviewed and reconciled. She denies any changes.     Adverse events were reviewed and reconciled. She denies hospitalizations, emergency room, urgent care visits. She denies surgeries or procedures. She denies cardiac or muscle related events.     She brought back her IP for accountability check and was found to be compliant. Education regarding IP administration was reviewed. She verbalized understanding. She denies IP administration complications.     She was thanked for her participation in research. She was encouraged to call with any questions or concerns. We will see her back in the clinic in April.       Study Drug Compliance:   Patient is compliant with Study drug? Yes      Events:    Adverse Events: None Reported    Serious Adverse Events: None Reported    Outcome Events: None Reported    Labs:  CENTRAL LABS DRAWN AND AVAILABLE IN THE LAB TAB

## 2019-02-07 LAB
EXTERNAL ALANINE AMINOTRANSFERASE (SGPT) (U/L) IN SER/PLAS: 17 U/L
EXTERNAL ASPARTATE AMINOTRANSFERASE (SGOT) (U/L) IN SER/PLAS: 18 U/L
EXTERNAL BILIRUBIN TOTAL (MG/DL) IN SER/PLAS: 0.6 MG/DL

## 2019-03-11 ENCOUNTER — APPOINTMENT (OUTPATIENT)
Dept: RADIOLOGY | Facility: HOSPITAL | Age: 79
DRG: 871 | End: 2019-03-11
Payer: MEDICARE

## 2019-03-11 ENCOUNTER — HOSPITAL ENCOUNTER (INPATIENT)
Facility: HOSPITAL | Age: 79
LOS: 3 days | Discharge: HOME | DRG: 871 | End: 2019-03-14
Attending: EMERGENCY MEDICINE | Admitting: HOSPITALIST
Payer: MEDICARE

## 2019-03-11 ENCOUNTER — APPOINTMENT (OUTPATIENT)
Dept: CT IMAGING | Facility: HOSPITAL | Age: 79
DRG: 871 | End: 2019-03-11
Payer: MEDICARE

## 2019-03-11 DIAGNOSIS — R53.1 WEAKNESS: ICD-10-CM

## 2019-03-11 DIAGNOSIS — I48.91 ATRIAL FIBRILLATION WITH RVR (CMS/HCC): Primary | ICD-10-CM

## 2019-03-11 DIAGNOSIS — J18.9 COMMUNITY ACQUIRED PNEUMONIA, UNSPECIFIED LATERALITY: ICD-10-CM

## 2019-03-11 LAB
ALBUMIN SERPL-MCNC: 4 G/DL (ref 3.5–5.3)
ALP SERPL-CCNC: 56 U/L (ref 55–142)
ALT SERPL-CCNC: 11 U/L (ref 0–52)
ANION GAP SERPL CALC-SCNC: 11 MMOL/L (ref 3–11)
AST SERPL-CCNC: 15 U/L (ref 0–39)
B PARAP IS1001 DNA NPH QL NAA+NON-PROBE: NEGATIVE
B PERT.PT PRMT NPH QL NAA+NON-PROBE: NEGATIVE
BACTERIA #/AREA URNS AUTO: NORMAL /HPF
BILIRUB DIRECT SERPL-MCNC: 0.13 MG/DL (ref 0–0.2)
BILIRUB SERPL-MCNC: 0.94 MG/DL (ref 0–1.4)
BILIRUB UR QL STRIP.AUTO: NEGATIVE
BUN SERPL-MCNC: 14 MG/DL (ref 7–25)
C PNEUM DNA NPH QL NAA+NON-PROBE: NEGATIVE
CALCIUM SERPL-MCNC: 9.3 MG/DL (ref 8.6–10.3)
CHLORIDE SERPL-SCNC: 97 MMOL/L (ref 98–107)
CLARITY UR: CLEAR
CO2 SERPL-SCNC: 24 MMOL/L (ref 21–32)
COLOR UR: YELLOW
CREAT SERPL-MCNC: 0.82 MG/DL (ref 0.6–1.2)
EPITHELIAL CELLS ON REPIRATORY GRAM STAIN /LPF: NORMAL /LPF
ERYTHROCYTE [DISTWIDTH] IN BLOOD BY AUTOMATED COUNT: 14.4 % (ref 11.5–14)
FLUAV RNA NPH QL NAA+NON-PROBE: NEGATIVE
FLUBV RNA NPH QL NAA+NON-PROBE: NEGATIVE
GFR SERPL CREATININE-BSD FRML MDRD: 68 ML/MIN/1.73M*2
GLUCOSE SERPL-MCNC: 152 MG/DL (ref 70–105)
GLUCOSE UR STRIP.AUTO-MCNC: NEGATIVE MG/DL
HADV DNA NPH QL NAA+NON-PROBE: NEGATIVE
HCOV 229E RNA NPH QL NAA+NON-PROBE: NEGATIVE
HCOV HKU1 RNA NPH QL NAA+NON-PROBE: NEGATIVE
HCOV NL63 RNA NPH QL NAA+NON-PROBE: NEGATIVE
HCOV OC43 RNA NPH QL NAA+NON-PROBE: NEGATIVE
HCT VFR BLD AUTO: 40.6 % (ref 34–45)
HGB BLD-MCNC: 13.5 G/DL (ref 11.5–15.5)
HGB UR QL STRIP.AUTO: NEGATIVE
HMPV RNA NPH QL NAA+NON-PROBE: NEGATIVE
HPIV1 RNA NPH QL NAA+NON-PROBE: NEGATIVE
HPIV2 RNA NPH QL NAA+NON-PROBE: NEGATIVE
HPIV3 RNA NPH QL NAA+NON-PROBE: NEGATIVE
HPIV4 RNA NPH QL NAA+NON-PROBE: NEGATIVE
KETONES UR STRIP.AUTO-MCNC: ABNORMAL MG/DL
L PNEUMO AG UR QL: NEGATIVE
LACTATE BLDV-SCNC: 0.68 MMOL/L (ref 0.5–1.99)
LEUKOCYTE ESTERASE UR QL STRIP: NEGATIVE
M PNEUMO DNA NPH QL NAA+NON-PROBE: NEGATIVE
MCH RBC QN AUTO: 29.1 PG (ref 28–33)
MCHC RBC AUTO-ENTMCNC: 33.2 G/DL (ref 32–36)
MCV RBC AUTO: 87.8 FL (ref 81–97)
MUCUS PRESENCE IN REPIRATORY GRAM STAIN: NORMAL
NITRITE UR QL STRIP.AUTO: NEGATIVE
ORGANISM PREDOMINANCE IN REPIRATORY GRAM STAIN: NORMAL
OVERALL GRADE OF RESPIRATORY GRAM STAIN: NORMAL
PH UR STRIP.AUTO: 6 PH
PLATELET # BLD AUTO: 241 10*3/UL (ref 140–350)
PMNS ON RESPIRATORY GRAM STAIN /LPF: NORMAL /LPF
PMV BLD AUTO: 8.8 FL (ref 6.9–10.8)
POTASSIUM SERPL-SCNC: 3.8 MMOL/L (ref 3.5–5.1)
PROT SERPL-MCNC: 6.8 G/DL (ref 6–8.3)
PROT UR STRIP.AUTO-MCNC: NEGATIVE MG/DL
RBC # BLD AUTO: 4.62 10*6/ΜL (ref 3.7–5.3)
RBC #/AREA URNS AUTO: NORMAL /HPF
REPIRATORY GRAM STAIN INTERP: NORMAL
RSV RNA NPH QL NAA+NON-PROBE: NEGATIVE
RV+EV RNA NPH QL NAA+NON-PROBE: NEGATIVE
S PNEUM AG SPEC QL: NEGATIVE
SODIUM SERPL-SCNC: 132 MMOL/L (ref 135–145)
SP GR UR STRIP.AUTO: 1.02 (ref 1–1.03)
SQUAMOUS #/AREA URNS AUTO: NEGATIVE /HPF
TROPONIN I SERPL-MCNC: <0.03 NG/ML
UROBILINOGEN UR STRIP.AUTO-MCNC: 2 E.U./DL
WBC # BLD AUTO: 13.4 10*3/UL (ref 4.5–10.5)
WBC #/AREA URNS AUTO: NORMAL /HPF

## 2019-03-11 PROCEDURE — 80048 BASIC METABOLIC PNL TOTAL CA: CPT | Performed by: EMERGENCY MEDICINE

## 2019-03-11 PROCEDURE — 71045 X-RAY EXAM CHEST 1 VIEW: CPT

## 2019-03-11 PROCEDURE — 83605 ASSAY OF LACTIC ACID: CPT

## 2019-03-11 PROCEDURE — 6360000200 HC RX 636 W HCPCS (ALT 250 FOR IP): Performed by: NURSE PRACTITIONER

## 2019-03-11 PROCEDURE — 80076 HEPATIC FUNCTION PANEL: CPT | Performed by: HOSPITALIST

## 2019-03-11 PROCEDURE — 87486 CHLMYD PNEUM DNA AMP PROBE: CPT | Performed by: FAMILY MEDICINE

## 2019-03-11 PROCEDURE — 87205 SMEAR GRAM STAIN: CPT | Performed by: HOSPITALIST

## 2019-03-11 PROCEDURE — 99285 EMERGENCY DEPT VISIT HI MDM: CPT

## 2019-03-11 PROCEDURE — 96374 THER/PROPH/DIAG INJ IV PUSH: CPT

## 2019-03-11 PROCEDURE — 2580000300 HC RX 258: Performed by: HOSPITALIST

## 2019-03-11 PROCEDURE — 2500000200 HC RX 250 WO HCPCS: Performed by: EMERGENCY MEDICINE

## 2019-03-11 PROCEDURE — 84484 ASSAY OF TROPONIN QUANT: CPT | Performed by: EMERGENCY MEDICINE

## 2019-03-11 PROCEDURE — 3700 RSCH CLEAR SAE (COMPLETE SUPPLEMENTAL CHARGE SHEET): Mod: RPD | Performed by: NURSE PRACTITIONER

## 2019-03-11 PROCEDURE — 70450 CT HEAD/BRAIN W/O DYE: CPT

## 2019-03-11 PROCEDURE — 2580000300 HC RX 258: Performed by: EMERGENCY MEDICINE

## 2019-03-11 PROCEDURE — 87070 CULTURE OTHR SPECIMN AEROBIC: CPT | Performed by: HOSPITALIST

## 2019-03-11 PROCEDURE — 87449 NOS EACH ORGANISM AG IA: CPT | Performed by: FAMILY MEDICINE

## 2019-03-11 PROCEDURE — 93005 ELECTROCARDIOGRAM TRACING: CPT | Performed by: EMERGENCY MEDICINE

## 2019-03-11 PROCEDURE — 81001 URINALYSIS AUTO W/SCOPE: CPT | Performed by: EMERGENCY MEDICINE

## 2019-03-11 PROCEDURE — 85027 COMPLETE CBC AUTOMATED: CPT | Performed by: EMERGENCY MEDICINE

## 2019-03-11 PROCEDURE — 6360000200 HC RX 636 W HCPCS (ALT 250 FOR IP): Performed by: HOSPITALIST

## 2019-03-11 PROCEDURE — 36415 COLL VENOUS BLD VENIPUNCTURE: CPT | Performed by: EMERGENCY MEDICINE

## 2019-03-11 PROCEDURE — 2500000200 HC RX 250 WO HCPCS: Performed by: HOSPITALIST

## 2019-03-11 PROCEDURE — (BLANK) HC ROOM ICU INTERMEDIATE

## 2019-03-11 PROCEDURE — 87040 BLOOD CULTURE FOR BACTERIA: CPT | Performed by: HOSPITALIST

## 2019-03-11 PROCEDURE — 99285 EMERGENCY DEPT VISIT HI MDM: CPT | Performed by: EMERGENCY MEDICINE

## 2019-03-11 PROCEDURE — 99223 1ST HOSP IP/OBS HIGH 75: CPT | Mod: AI | Performed by: HOSPITALIST

## 2019-03-11 RX ORDER — ACETAMINOPHEN 325 MG/1
325-650 TABLET ORAL EVERY 4 HOURS PRN
Status: DISCONTINUED | OUTPATIENT
Start: 2019-03-11 | End: 2019-03-14 | Stop reason: HOSPADM

## 2019-03-11 RX ORDER — SODIUM CHLORIDE 9 MG/ML
100 INJECTION, SOLUTION INTRAVENOUS CONTINUOUS
Status: DISCONTINUED | OUTPATIENT
Start: 2019-03-11 | End: 2019-03-11

## 2019-03-11 RX ORDER — ONDANSETRON HYDROCHLORIDE 2 MG/ML
4 INJECTION, SOLUTION INTRAVENOUS EVERY 6 HOURS PRN
Status: DISCONTINUED | OUTPATIENT
Start: 2019-03-11 | End: 2019-03-14 | Stop reason: HOSPADM

## 2019-03-11 RX ORDER — SODIUM CHLORIDE 9 MG/ML
175 INJECTION, SOLUTION INTRAVENOUS CONTINUOUS
Status: DISCONTINUED | OUTPATIENT
Start: 2019-03-11 | End: 2019-03-11

## 2019-03-11 RX ORDER — ONDANSETRON 4 MG/1
4 TABLET, FILM COATED ORAL EVERY 6 HOURS PRN
Status: DISCONTINUED | OUTPATIENT
Start: 2019-03-11 | End: 2019-03-14 | Stop reason: HOSPADM

## 2019-03-11 RX ORDER — OXYCODONE HYDROCHLORIDE 5 MG/1
5-10 TABLET ORAL EVERY 4 HOURS PRN
Status: DISCONTINUED | OUTPATIENT
Start: 2019-03-11 | End: 2019-03-14 | Stop reason: HOSPADM

## 2019-03-11 RX ORDER — DILTIAZEM HYDROCHLORIDE 5 MG/ML
10 INJECTION INTRAVENOUS ONCE
Status: COMPLETED | OUTPATIENT
Start: 2019-03-11 | End: 2019-03-11

## 2019-03-11 RX ORDER — SODIUM CHLORIDE 9 MG/ML
1000 INJECTION, SOLUTION INTRAVENOUS ONCE
Status: COMPLETED | OUTPATIENT
Start: 2019-03-11 | End: 2019-03-11

## 2019-03-11 RX ORDER — FUROSEMIDE 10 MG/ML
20 INJECTION INTRAMUSCULAR; INTRAVENOUS ONCE
Status: COMPLETED | OUTPATIENT
Start: 2019-03-11 | End: 2019-03-11

## 2019-03-11 RX ADMIN — ACETAMINOPHEN 650 MG: 325 TABLET ORAL at 19:05

## 2019-03-11 RX ADMIN — SODIUM CHLORIDE 100 ML/HR: 9 INJECTION, SOLUTION INTRAVENOUS at 04:25

## 2019-03-11 RX ADMIN — CEFTRIAXONE 2000 MG: 2 INJECTION, POWDER, FOR SOLUTION INTRAMUSCULAR; INTRAVENOUS at 07:05

## 2019-03-11 RX ADMIN — ACETAMINOPHEN 650 MG: 325 TABLET ORAL at 13:10

## 2019-03-11 RX ADMIN — FUROSEMIDE 20 MG: 10 INJECTION, SOLUTION INTRAVENOUS at 19:04

## 2019-03-11 RX ADMIN — AZITHROMYCIN MONOHYDRATE 500 MG: 500 INJECTION, POWDER, LYOPHILIZED, FOR SOLUTION INTRAVENOUS at 07:30

## 2019-03-11 RX ADMIN — DILTIAZEM HYDROCHLORIDE 10 MG: 5 INJECTION INTRAVENOUS at 04:50

## 2019-03-11 RX ADMIN — SODIUM CHLORIDE 100 ML/HR: 9 INJECTION, SOLUTION INTRAVENOUS at 13:57

## 2019-03-11 RX ADMIN — SODIUM CHLORIDE 1000 ML: 9 INJECTION, SOLUTION INTRAVENOUS at 07:06

## 2019-03-11 RX ADMIN — DILTIAZEM HYDROCHLORIDE 5 MG/HR: 5 INJECTION INTRAVENOUS at 04:55

## 2019-03-11 RX ADMIN — DILTIAZEM HYDROCHLORIDE 10 MG/HR: 5 INJECTION INTRAVENOUS at 22:17

## 2019-03-11 ASSESSMENT — ACTIVITIES OF DAILY LIVING (ADL)
ASSISTIVE_DEVICE: DENTURES UPPER;DENTURES PARTIAL;EYEGLASSES
PATIENT'S MEMORY ADEQUATE TO SAFELY COMPLETE DAILY ACTIVITIES?: YES
ADEQUATE_TO_COMPLETE_ADL: YES

## 2019-03-11 NOTE — MEDICATION HISTORY SPECIALIST NOTES
CSN: [id number]  : [id number]    Information sources:  EPIC  Complete Dispense Report    Allergies verified.    Patient interviewed ED who provided all history. Patient verified medications and provided last doses. Verified with Complete Dispense Report.    Discrepancies:  See yellow note    **High alert medications**  Eliquis  **Research med**  See Chart Review

## 2019-03-11 NOTE — H&P
HISTORY AND PHYSICAL NOTE        CHIEF COMPLAINT:  Fatigue    HPI:  77 y/o WF with PMH of Afib who is presenting to our ED with a couple of days of fatigue. While patient denies every other symptoms I ask about, daughter who is an ED nurse in [facility] and is at the bedside reports that she has been having SOB with exertion and chills over the last couple of days and EMS told her that she was warm to touch. Patient admits to have been coughing over the last day or so and denies sputum production. No reports of diarrhea, nausea, vomiting or abdominal pain but daughter adds that she has been having urinary frequency over the last few days. Patient denies chest pain or palpitation. On arrival to the ED, patient was found to be in Afib with RVR.    ALLERGIES:  Allergies   Allergen Reactions   • Amoxicillin    • Metronidazole    • Sulfa (Sulfonamide Antibiotics)          HOME MEDICATIONS:    Current Outpatient Medications:   •  INVESTIGATIONAL, CLEAR [id number], Bempedoic acid, ETC-1002, vs placebo tablet, Take 1 tablet (180 mg total) by mouth daily, Disp: 35 tablet, Rfl: 0  •  ezetimibe (ZETIA) 10 mg tablet, Take 1 tablet (10 mg total) by mouth daily. (Patient not taking: Reported on 11/30/2018 ), Disp: 30 tablet, Rfl: 11  •  diltiazem CD (CARDIZEM CD) 180 mg 24 hr capsule, Take 1 capsule (180 mg total) by mouth daily., Disp: 90 capsule, Rfl: 3  •  Lactobacillus acidophilus (PROBIOTIC) 10 billion cell capsule, Take by mouth., Disp: , Rfl:   •  aspirin 81 mg EC tablet, Take 1 tablet (81 mg total) by mouth daily., Disp: 30 tablet, Rfl: 11  •  apixaban (ELIQUIS) 5 mg tablet, every 12 hours., Disp: , Rfl:   •  dexlansoprazole (DEXILANT) 30 mg capsule, daily., Disp: , Rfl:     PMH:   Past Medical History:   Diagnosis Date   • A-fib (CMS/HCC) (HCC)    • A-fib (CMS/HCC) (HCC)    • DVT (deep venous thrombosis) (CMS/HCC) (HCC)    • Hypercholesteremia         FAMILY HISTORY:  Family History   Problem Relation Age of  "Onset   • Other Mother         Complications of TB and emphysema, Cause of Death   • Blood Clots Father         Cause of Death   • Other Sister         Carotid endarterectomy   • Other Brother         Carotid endarterectomy      SOCIAL HISTORY:  Used to smoke, quit recently. Denies remarkable history of drinking.      ROS:  All systems reviewed and a 14 point review of system is remarkable for what has been mentioned in the body of present illness.      VITAL SIGNS:  /62 (Patient Position: Head of bed 30 degrees or higher)   Pulse 90   Temp 37.7 °C (99.9 °F) (Oral)   Resp 24   Ht 1.575 m (5' 2\")   Wt 79.8 kg (175 lb 14.8 oz)   SpO2 95%   BMI 32.18 kg/m²     PHYSICAL EXAM  General Appearance: Middle-aged WF in no remarkable pain or distress at the time of my encounter.   HEENT:  PERRLA. No neck vein distension.  No neck rigidity.  CHEST:  Crackles on right mid to lower lung   HEART:  Tachycardic and irregular  ABDOMEN:  Soft, positive bowel sounds, no tenderness, no distention. No ascites  MUSCULOSKELETAL: No lower or upper extremities edema or swelling. Joints are grossly unremarkable.  SKIN/HAIR/NAILS:  no remarkable rash or lesions.  NEURO:  Grossly non-focal  PSYCH:  Alert and orient times three    LABS AND DIAGNOSTICS  Results for orders placed or performed during the hospital encounter of 03/11/19   CBC Blood, Venous   Result Value Ref Range    WBC 13.4 (H) 4.5 - 10.5 10*3/uL    RBC 4.62 3.70 - 5.30 10*6/µL    Hemoglobin 13.5 11.5 - 15.5 g/dL    Hematocrit 40.6 34.0 - 45.0 %    MCV 87.8 81.0 - 97.0 fL    MCH 29.1 28.0 - 33.0 pg    MCHC 33.2 32.0 - 36.0 g/dL    RDW 14.4 (H) 11.5 - 14.0 %    Platelets 241 140 - 350 10*3/uL    MPV 8.8 6.9 - 10.8 fL   Basic metabolic panel Blood, Venous   Result Value Ref Range    Sodium 132 (L) 135 - 145 mmol/L    Potassium 3.8 3.5 - 5.1 mmol/L    Chloride 97 (L) 98 - 107 mmol/L    CO2 24 21 - 32 mmol/L    BUN 14 7 - 25 mg/dL    Creatinine 0.82 0.60 - 1.20 mg/dL    " Glucose 152 (H) 70 - 105 mg/dL    Calcium 9.3 8.6 - 10.3 mg/dL    Anion Gap 11 3 - 11 mmol/L    eGFR 68 >60 mL/min/1.73m*2   Troponin I Blood, Venous   Result Value Ref Range    Troponin I <0.030 <0.030 ng/mL       EKG: Afib with RVR  CXR: R middle lung infiltrate suspected       ASSESSMENT and PLAN     • Sepsis: Deemed to be due to PNA but will still check a UA.    • RML PNA suspected: Will start on Ceftriaxone/Zithromax. B/C sent.    • Afib with RVR: Deemed to be due to sepsis. Currently on Cardizem drip.    • Urinary frequency: Will check a UA.    • DVT PROPHYLAXIS: Will restart Eliquis after verified by pharmacy.       ADONAY PRICE MD  6:12 AM, 3/11/2019.

## 2019-03-11 NOTE — ED PROVIDER NOTES
Chief Complaint: Weakness - Generalized (Pt c/o generalized weakness in legs for the last 2 days that has increased.  Yesterday slipped off couch and was unable to get up on own. )    Weakness    HPI:    The patient arrives via EMS from home.  Is reported that she has had a 2-day history of worsening generalized weakness especially in her legs.  She reports decreased ability to ambulate.  She denies any injuries or recent illness.  She denies chest pain shortness of breath abdominal pain or vomiting.  She is alert and in no acute distress on exam.  She has no obvious focal unilateral deficits.  There are no reported exacerbating or relieving factors.    Past Medical History:   Diagnosis Date   • A-fib (CMS/HCC) (HCC)    • A-fib (CMS/HCC) (HCC)    • DVT (deep venous thrombosis) (CMS/HCC) (HCC)    • Hypercholesteremia        Past Surgical History:   Procedure Laterality Date   • CAROTID STENT     • TUBAL LIGATION         Social History     Socioeconomic History   • Marital status:      Spouse name: Not on file   • Number of children: Not on file   • Years of education: Not on file   • Highest education level: Not on file   Social Needs   • Financial resource strain: Not on file   • Food insecurity - worry: Not on file   • Food insecurity - inability: Not on file   • Transportation needs - medical: Not on file   • Transportation needs - non-medical: Not on file   Occupational History   • Not on file   Tobacco Use   • Smoking status: Former Smoker     Packs/day: 0.50     Years: 10.00     Pack years: 5.00     Types: Cigarettes     Last attempt to quit: 2017     Years since quittin.1   • Smokeless tobacco: Never Used   Substance and Sexual Activity   • Alcohol use: No   • Drug use: No   • Sexual activity: Defer   Other Topics Concern   • Not on file   Social History Narrative   • Not on file       Family History   Problem Relation Age of Onset   • Other Mother         Complications of TB and emphysema, Cause  of Death   • Blood Clots Father         Cause of Death   • Other Sister         Carotid endarterectomy   • Other Brother         Carotid endarterectomy       Allergies   Allergen Reactions   • Amoxicillin    • Metronidazole    • Sulfa (Sulfonamide Antibiotics)          Current Outpatient Medications:   •  INVESTIGATIONAL, CLEAR [id number], Bempedoic acid, ETC-1002, vs placebo tablet, Take 1 tablet (180 mg total) by mouth daily, Disp: 35 tablet, Rfl: 0  •  ezetimibe (ZETIA) 10 mg tablet, Take 1 tablet (10 mg total) by mouth daily. (Patient not taking: Reported on 11/30/2018 ), Disp: 30 tablet, Rfl: 11  •  diltiazem CD (CARDIZEM CD) 180 mg 24 hr capsule, Take 1 capsule (180 mg total) by mouth daily., Disp: 90 capsule, Rfl: 3  •  Lactobacillus acidophilus (PROBIOTIC) 10 billion cell capsule, Take by mouth., Disp: , Rfl:   •  aspirin 81 mg EC tablet, Take 1 tablet (81 mg total) by mouth daily., Disp: 30 tablet, Rfl: 11  •  apixaban (ELIQUIS) 5 mg tablet, every 12 hours., Disp: , Rfl:   •  dexlansoprazole (DEXILANT) 30 mg capsule, daily., Disp: , Rfl:       ROS:  Constitutional: negative for fever  Eyes: negative for eye pain  ENT: negative for sore throat  Cardiovascular: negative for chest pain  Respiratory: negative for hemoptysis  GI: negative for abdominal pain  : negative for hematuria  Musculoskeletal: negative for back pain  Neuro: negative for headache  Hematology: negative for bleeding  Immunology: negative for joint pain      ED Triage Vitals   Temp Heart Rate Resp BP SpO2   03/11/19 0412 03/11/19 0412 03/11/19 0412 03/11/19 0412 03/11/19 0412   37.7 °C (99.9 °F) 128 16 125/89 90 %      Temp Source Heart Rate Source Patient Position BP Location FiO2 (%)   03/11/19 0412 -- 03/11/19 0500 -- --   Oral  Head of bed 30 degrees or higher           Physical Exam:  Nursing note and vitals reviewed.  Constitutional: appears well-developed.   HENT:   Head: Normocephalic and atraumatic.   Eyes: Pupils are equal,  round, and reactive to light.   Neck: Supple, no lymphadenopathy  Cardiovascular: Tachycardia.  Normal pulses.  Pulmonary/Chest: No respiratory distress.  Clear to auscultation bilaterally.  Abdominal: Soft and nontender.    Back: No CVA tenderness.  Musculoskeletal: No edema  Neurological: Alert. No unilateral focal deficits noted  Skin: Skin is warm and dry. No rash noted.   Psychiatric: Normal mood and affect.           Labs:  Labs Reviewed   CBC - Abnormal       Result Value    WBC 13.4 (*)     RBC 4.62      Hemoglobin 13.5      Hematocrit 40.6      MCV 87.8      MCH 29.1      MCHC 33.2      RDW 14.4 (*)     Platelets 241      MPV 8.8     BASIC METABOLIC PANEL - Abnormal    Sodium 132 (*)     Potassium 3.8      Chloride 97 (*)     CO2 24      BUN 14      Creatinine 0.82      Glucose 152 (*)     Calcium 9.3      Anion Gap 11      eGFR 68      Narrative:     Estimated GFR calculated using the 2009 CKD-EPI creatinine equation.   TROPONIN I - Normal    Troponin I <0.030     URINALYSIS WITH MICROSCOPIC    Narrative:     The following orders were created for panel order Urinalysis w/microscopic Urine, Clean Catch.  Procedure                               Abnormality         Status                     ---------                               -----------         ------                     Urinalysis, microscopic U...[[id number]]                                                 Urinalysis, dipstick Urin...[[id number]]                                                   Please view results for these tests on the individual orders.   URINALYSIS, MICROSCOPIC ONLY   URINALYSIS, DIPSTICK ONLY, FOR USE WITH MICROSCOPIC PANEL         Imaging:  CT head without IV contrast   Preliminary Result   IMPRESSION:   Generalized involutional changes and chronic microvascular changes appear    stable.  No acute abnormality identified.                MDM and ED COURSE  The patient was treated with IV fluids along with an IV Cardizem bolus and drip  for her unstable tachyarrhythmia vital signs improved on reexamination she has no respiratory compromise discussed with hospitalist who will admit for further care.  ED Course as of Mar 11 0530   Mon Mar 11, 2019   0416 EKG A. fib tachycardia nonspecific ST changes  [NL]      ED Course User Index  [NL] Benjamin Flores MD       Given   Medications   normal saline infusion (100 mL/hr intravenous New Bag/New Syringe 3/11/19 0425)   diltiazem (CARDIZEM) 125 mg in normal saline 125 mL infusion (5 mg/hr intravenous New Bag/New Syringe 3/11/19 0455)   diltiazem (CARDIZEM) injection 10 mg (10 mg intravenous Given 3/11/19 0450)           Critical Care  Performed by: Benjamin Flores MD  Authorized by: Benjamin Flores MD     Critical care provider statement:     Critical care time (minutes):  30    Critical care time was exclusive of:  Separately billable procedures and treating other patients    Critical care was time spent personally by me on the following activities:  Development of treatment plan with patient or surrogate, discussions with consultants, evaluation of patient's response to treatment, examination of patient and re-evaluation of patient's condition  Comments:      The high probability of sudden, clinically significant or life-threatening deterioration required my full and direct attention, intervention and personal management while the patient was critical. I provided the critical care services as indicated above.             Clinical Impression:    Final diagnoses:   [I48.91] Atrial fibrillation with RVR (CMS/HCC) ([facility])   [R53.1] Weakness           A voice recognition program was used to aid in documentation of this record.  Sometimes words are not printed exactly as they were spoken.  While efforts were made to carefully edit and correct any inaccuracies, some areas may be present; please take these into context.  Please contact the provider if areas are identified.       Benjamin Flores MD  03/11/19 1326

## 2019-03-11 NOTE — PLAN OF CARE
Problem: Cardiovascular - Adult  Goal: Maintains optimal cardiac output and hemodynamic stability  Description  INTERVENTIONS:  1. Monitor vital signs and rhythm  2. Monitor for hypotension and other signs of decreased cardiac output  3. Administer and titrate ordered vasoactive medications to optimize hemodynamic stability  4. Monitor for fluid overload/dehydration, weight gain, shortness of breath and activity intolerance  5. Monitor arterial and/or venous puncture sites for bleeding and/or hematoma  6. Assess quality of pulses, capillary refill, edema, sensation, skin color and temperature  7. Assess for signs of decreased coronary artery perfusion - ex. angina  Outcome: Progressing  Goal: Absence of cardiac dysrhythmias or at baseline  Description  INTERVENTIONS:  1. Continuous cardiac monitoring, monitor vital signs, obtain 12 lead EKG if indicated  2. Administer antiarrhythmic and heart rate control medications as ordered  3. Initiate emergency measures for life threatening arrhythmias  4. Monitor electrolytes and administer replacement therapy as ordered  Outcome: Progressing     Problem: Respiratory - Adult  Goal: Achieves optimal ventilation and oxygenation  Description  INTERVENTIONS:  1. Assess for changes in respiratory status  2. Assess for changes in mentation and behavior  3. Position to facilitate oxygenation and minimize respiratory effort  4. Oxygen supplementation based on oxygen saturation or ABGs  5. Assess patient's ability to cough effectively  6. Encourage broncho-pulmonary hygiene including cough, deep breathe  7. Assess the need for suctioning   8. Assess and instruct to report SOB or any respiratory difficulty  9. Respiratory Therapy support as indicated, including medications and treatment.  Outcome: Progressing  Goal: Achieves optimal ventilation and oxygenation with noninvasive CPAP/BiLEVEL support  Description  INTERVENTIONS:  1. Provide education to patient/family about rationale  and expected outcomes associated with therapy  2. Position patient to facilitate optimal ventilation/oxygenation status and minimize respiratory effort  3. Position patient to reduce aspiration risk, elevate head of bed at least 35 degrees or higher, as applicable  4. Assess effectiveness of therapy on ventilation/oxygenation status based on oxygen saturation and/or arterial blood gases, as indicated  5. Assess patient for changes in respiratory and physiological status  6. Auscultate breath sounds and assess chest excursion, as indicated  7. Assess patient for changes in mentation and behavior  8. Routinely monitor equipment for proper performance and settings  9. Assess and monitor skin condition, in relationship to the respiratory interface  10. Assure equipment alarm volume is adequate for the patient's environment  11. Immediately respond to equipment alarm, to assess patient and/or cause for alarm  12. Follow universal infection control/hospital policy(ies)/standards  Outcome: Progressing

## 2019-03-12 LAB
ANION GAP SERPL CALC-SCNC: 11 MMOL/L (ref 3–11)
BASOPHILS # BLD AUTO: 0.1 10*3/UL
BASOPHILS NFR BLD AUTO: 1 % (ref 0–2)
BUN SERPL-MCNC: 10 MG/DL (ref 7–25)
CALCIUM SERPL-MCNC: 8.8 MG/DL (ref 8.6–10.3)
CHLORIDE SERPL-SCNC: 100 MMOL/L (ref 98–107)
CO2 SERPL-SCNC: 27 MMOL/L (ref 21–32)
CREAT SERPL-MCNC: 0.7 MG/DL (ref 0.6–1.2)
EOSINOPHIL # BLD AUTO: 0 10*3/UL
EOSINOPHIL NFR BLD AUTO: 0 % (ref 0–3)
ERYTHROCYTE [DISTWIDTH] IN BLOOD BY AUTOMATED COUNT: 14.8 % (ref 11.5–14)
GFR SERPL CREATININE-BSD FRML MDRD: 82 ML/MIN/1.73M*2
GLUCOSE SERPL-MCNC: 133 MG/DL (ref 70–105)
HCT VFR BLD AUTO: 35.9 % (ref 34–45)
HGB BLD-MCNC: 12.1 G/DL (ref 11.5–15.5)
LYMPHOCYTES # BLD AUTO: 0.7 10*3/UL
LYMPHOCYTES NFR BLD AUTO: 6 % (ref 11–47)
MAGNESIUM SERPL-MCNC: 1.9 MG/DL (ref 1.8–2.4)
MCH RBC QN AUTO: 28.8 PG (ref 28–33)
MCHC RBC AUTO-ENTMCNC: 33.7 G/DL (ref 32–36)
MCV RBC AUTO: 85.5 FL (ref 81–97)
MONOCYTES # BLD AUTO: 0.8 10*3/UL
MONOCYTES NFR BLD AUTO: 7 % (ref 3–11)
NEUTROPHILS # BLD AUTO: 10.4 10*3/UL
NEUTROPHILS NFR BLD AUTO: 87 % (ref 41–81)
PLATELET # BLD AUTO: 213 10*3/UL (ref 140–350)
PMV BLD AUTO: 8.4 FL (ref 6.9–10.8)
POTASSIUM SERPL-SCNC: 3.4 MMOL/L (ref 3.5–5.1)
POTASSIUM SERPL-SCNC: 4.3 MMOL/L (ref 3.5–5.1)
RBC # BLD AUTO: 4.2 10*6/ΜL (ref 3.7–5.3)
SODIUM SERPL-SCNC: 138 MMOL/L (ref 135–145)
WBC # BLD AUTO: 12 10*3/UL (ref 4.5–10.5)

## 2019-03-12 PROCEDURE — 6360000200 HC RX 636 W HCPCS (ALT 250 FOR IP): Performed by: NURSE PRACTITIONER

## 2019-03-12 PROCEDURE — 36416 COLLJ CAPILLARY BLOOD SPEC: CPT | Performed by: NEUROLOGICAL SURGERY

## 2019-03-12 PROCEDURE — 85025 COMPLETE CBC W/AUTO DIFF WBC: CPT | Performed by: HOSPITALIST

## 2019-03-12 PROCEDURE — (BLANK) HC ROOM ICU INTERMEDIATE

## 2019-03-12 PROCEDURE — 80048 BASIC METABOLIC PNL TOTAL CA: CPT | Performed by: HOSPITALIST

## 2019-03-12 PROCEDURE — 99233 SBSQ HOSP IP/OBS HIGH 50: CPT | Performed by: NEUROLOGICAL SURGERY

## 2019-03-12 PROCEDURE — 6370000100 HC RX 637 (ALT 250 FOR IP): Performed by: HOSPITALIST

## 2019-03-12 PROCEDURE — 2580000300 HC RX 258: Performed by: HOSPITALIST

## 2019-03-12 PROCEDURE — 36415 COLL VENOUS BLD VENIPUNCTURE: CPT | Performed by: HOSPITALIST

## 2019-03-12 PROCEDURE — 83735 ASSAY OF MAGNESIUM: CPT | Performed by: NEUROLOGICAL SURGERY

## 2019-03-12 PROCEDURE — 6370000100 HC RX 637 (ALT 250 FOR IP): Performed by: NEUROLOGICAL SURGERY

## 2019-03-12 PROCEDURE — 6360000200 HC RX 636 W HCPCS (ALT 250 FOR IP): Performed by: HOSPITALIST

## 2019-03-12 PROCEDURE — 84132 ASSAY OF SERUM POTASSIUM: CPT | Performed by: NEUROLOGICAL SURGERY

## 2019-03-12 RX ORDER — EZETIMIBE 10 MG/1
10 TABLET ORAL DAILY
Status: DISCONTINUED | OUTPATIENT
Start: 2019-03-12 | End: 2019-03-14 | Stop reason: HOSPADM

## 2019-03-12 RX ORDER — FUROSEMIDE 10 MG/ML
20 INJECTION INTRAMUSCULAR; INTRAVENOUS ONCE
Status: COMPLETED | OUTPATIENT
Start: 2019-03-12 | End: 2019-03-12

## 2019-03-12 RX ORDER — DILTIAZEM HYDROCHLORIDE 60 MG/1
60 TABLET, FILM COATED ORAL EVERY 6 HOURS SCHEDULED
Status: COMPLETED | OUTPATIENT
Start: 2019-03-12 | End: 2019-03-13

## 2019-03-12 RX ORDER — DILTIAZEM HYDROCHLORIDE 60 MG/1
30 TABLET, FILM COATED ORAL EVERY 6 HOURS SCHEDULED
Status: DISCONTINUED | OUTPATIENT
Start: 2019-03-12 | End: 2019-03-12

## 2019-03-12 RX ORDER — DEXLANSOPRAZOLE 30 MG/1
30 CAPSULE, DELAYED RELEASE ORAL
Status: DISCONTINUED | OUTPATIENT
Start: 2019-03-12 | End: 2019-03-14 | Stop reason: HOSPADM

## 2019-03-12 RX ORDER — ASPIRIN 81 MG/1
81 TABLET ORAL DAILY
Status: DISCONTINUED | OUTPATIENT
Start: 2019-03-12 | End: 2019-03-14 | Stop reason: HOSPADM

## 2019-03-12 RX ORDER — DILTIAZEM HYDROCHLORIDE 180 MG/1
180 CAPSULE, COATED, EXTENDED RELEASE ORAL DAILY
Status: DISCONTINUED | OUTPATIENT
Start: 2019-03-13 | End: 2019-03-14 | Stop reason: HOSPADM

## 2019-03-12 RX ADMIN — OXYCODONE HYDROCHLORIDE 5 MG: 5 TABLET ORAL at 21:11

## 2019-03-12 RX ADMIN — DILTIAZEM HYDROCHLORIDE 60 MG: 60 TABLET, FILM COATED ORAL at 23:27

## 2019-03-12 RX ADMIN — OXYCODONE HYDROCHLORIDE 5 MG: 5 TABLET ORAL at 16:07

## 2019-03-12 RX ADMIN — POTASSIUM BICARBONATE 20 MEQ: 782 TABLET, EFFERVESCENT ORAL at 14:21

## 2019-03-12 RX ADMIN — ASPIRIN 81 MG: 81 TABLET ORAL at 16:07

## 2019-03-12 RX ADMIN — ACETAMINOPHEN 650 MG: 325 TABLET ORAL at 10:38

## 2019-03-12 RX ADMIN — POTASSIUM BICARBONATE 20 MEQ: 782 TABLET, EFFERVESCENT ORAL at 13:00

## 2019-03-12 RX ADMIN — FUROSEMIDE 20 MG: 10 INJECTION, SOLUTION INTRAVENOUS at 03:36

## 2019-03-12 RX ADMIN — DEXLANSOPRAZOLE 30 MG: 30 CAPSULE, DELAYED RELEASE ORAL at 16:07

## 2019-03-12 RX ADMIN — ACETAMINOPHEN 650 MG: 325 TABLET ORAL at 03:36

## 2019-03-12 RX ADMIN — APIXABAN 5 MG: 5 TABLET, FILM COATED ORAL at 20:39

## 2019-03-12 RX ADMIN — DILTIAZEM HYDROCHLORIDE 60 MG: 60 TABLET, FILM COATED ORAL at 18:00

## 2019-03-12 RX ADMIN — POTASSIUM BICARBONATE 20 MEQ: 782 TABLET, EFFERVESCENT ORAL at 12:00

## 2019-03-12 RX ADMIN — AZITHROMYCIN MONOHYDRATE 500 MG: 500 INJECTION, POWDER, LYOPHILIZED, FOR SOLUTION INTRAVENOUS at 06:22

## 2019-03-12 RX ADMIN — CEFTRIAXONE 2000 MG: 2 INJECTION, POWDER, FOR SOLUTION INTRAMUSCULAR; INTRAVENOUS at 05:39

## 2019-03-12 RX ADMIN — EZETIMIBE 10 MG: 10 TABLET ORAL at 16:07

## 2019-03-12 NOTE — PROGRESS NOTES
DATE OF SERVICE: 3/12/2019  TIME OF SERVICE: 0831-4965      Full Code    SUBJECTIVE:  - Patient was sitting in bed when I walked into the room  - patient's nurse present bedside  -States she has been short of breath since admission, states she can barely get full sentences out without becoming short of breath.  - patient denies any complaints of chest pain, abdominal pain or nausea and vomiting  - No complaints of fevers, chills or night sweats.  - No issues with sleep overnight.    Prior to Admission medications    Medication Sig Start Date End Date Taking? Authorizing Provider   diltiazem CD (CARDIZEM CD) 180 mg 24 hr capsule Take 1 capsule (180 mg total) by mouth daily. 3/27/18  Yes Mildred Taylor CNP   Lactobacillus acidophilus (PROBIOTIC) 10 billion cell capsule Take 1 capsule by mouth daily     Yes Historical Provider, MD   aspirin 81 mg EC tablet Take 1 tablet (81 mg total) by mouth daily. 3/26/18  Yes WILDER Coello   apixaban (ELIQUIS) 5 mg tablet Take 5 mg by mouth 2 (two) times a day   6/22/17  Yes Historical Provider, MD   dexlansoprazole (DEXILANT) 30 mg capsule Take 30 mg by mouth daily   6/22/17  Yes Historical Provider, MD   INVESTIGATIONAL, CLEAR [id number], Bempedoic acid, ETC-1002, vs placebo tablet Take 1 tablet (180 mg total) by mouth daily 12/11/18   Balaji Grant CNP   ezetimibe (ZETIA) 10 mg tablet Take 1 tablet (10 mg total) by mouth daily.  Patient not taking: Reported on 11/30/2018 5/24/18 5/24/19  WILDER Coello       REVIEW OF SYSTEMS:     Other than what was mentioned in the subjective portion, a 14 point review of system is otherwise negative.     OBJECTIVE:    Patient Vitals for the past 24 hrs:   BP Temp Temp src Pulse Resp SpO2 Weight   03/12/19 1154 102/53 37.6 °C (99.7 °F) Oral 95 20 90 % --   03/12/19 0752 101/56 37.3 °C (99.1 °F) Oral 80 20 100 % --   03/12/19 0525 -- -- -- -- -- -- 83.5 kg (184 lb 1.4 oz)   03/12/19 0518 120/62 -- -- 98 24 97 % --   03/12/19  0330 169/75 37.5 °C (99.5 °F) Axillary 92 (!) 40 97 % --   03/11/19 2352 127/74 37.2 °C (99 °F) Oral 97 20 99 % --   03/11/19 2300 128/59 -- -- 97 22 95 % --   03/11/19 2217 127/56 -- -- -- -- -- --   03/11/19 2200 110/54 -- -- 87 22 95 % --   03/11/19 2100 124/68 -- -- 99 22 95 % --   03/11/19 2000 154/69 -- -- 110 24 94 % --   03/11/19 1924 147/76 (!) 38.5 °C (101.3 °F) Oral 113 (!) 26 94 % --   03/11/19 1915 176/99 (!) 39.3 °C (102.7 °F) -- -- (!) 25 92 % --   03/11/19 1845 150/76 -- -- -- (!) 25 90 % --   03/11/19 1830 (!) 189/94 (!) 39.4 °C (102.9 °F) -- -- (!) 28 (!) 89 % --   03/11/19 1534 109/64 37.7 °C (99.9 °F) Oral 83 18 94 % --       HEENT:  HEAD: Normocephalic, atraumatic  EYES: PERRLA, EOMI, no scleral icterus  NOSE, MOUTH, THROAT: Mucous membranes are moist,  NECK: No JVD, neck is supple  CVS: S1, S2 heard. Normal rate, irregularly irregular rhythm. II/VI grade systolic murmur best heard in the aortic region. No rubs or gallops.  RESP: Patient has poor inspiratory effort. There poor air entry heard in both lung bases.  Rhonchi heard in right middle lung fields.  GI: Abdomen is flat, normal bowel sounds appreciated ×4, no tenderness on palpation  NEURO: Patient is alert and oriented ×4.  No focal neurological deficits appreciated.  EXTREMITIES: No upper or lower extremity edema. Good peripheral pulses. Warm temperature of both extremities.  MUSCULOSKELETAL: Normal range of motion  PSYCH: No depression      Admission on 03/11/2019   Component Date Value Ref Range Status   • WBC 03/11/2019 13.4* 4.5 - 10.5 10*3/uL Final   • RBC 03/11/2019 4.62  3.70 - 5.30 10*6/µL Final   • Hemoglobin 03/11/2019 13.5  11.5 - 15.5 g/dL Final   • Hematocrit 03/11/2019 40.6  34.0 - 45.0 % Final   • MCV 03/11/2019 87.8  81.0 - 97.0 fL Final   • MCH 03/11/2019 29.1  28.0 - 33.0 pg Final   • MCHC 03/11/2019 33.2  32.0 - 36.0 g/dL Final   • RDW 03/11/2019 14.4* 11.5 - 14.0 % Final   • Platelets 03/11/2019 241  140 - 350 10*3/uL  Final   • MPV 03/11/2019 8.8  6.9 - 10.8 fL Final   • Sodium 03/11/2019 132* 135 - 145 mmol/L Final   • Potassium 03/11/2019 3.8  3.5 - 5.1 mmol/L Final   • Chloride 03/11/2019 97* 98 - 107 mmol/L Final   • CO2 03/11/2019 24  21 - 32 mmol/L Final   • BUN 03/11/2019 14  7 - 25 mg/dL Final   • Creatinine 03/11/2019 0.82  0.60 - 1.20 mg/dL Final   • Glucose 03/11/2019 152* 70 - 105 mg/dL Final   • Calcium 03/11/2019 9.3  8.6 - 10.3 mg/dL Final   • Anion Gap 03/11/2019 11  3 - 11 mmol/L Final   • eGFR 03/11/2019 68  >60 mL/min/1.73m*2 Final   • Troponin I 03/11/2019 <0.030  <0.030 ng/mL Final   • RBC, Urine 03/11/2019 0-4  None seen, 0-4, Negative /HPF Final   • WBC, Urine 03/11/2019 0-4  0 - 4 /HPF Final   • Squamous Epithelial, Urine 03/11/2019 Negative  None Seen-9 /HPF Final   • Bacteria, Urine 03/11/2019 None seen  None seen, Few /HPF Final   • Color, Urine 03/11/2019 Yellow  Yellow Final   • Clarity, Urine 03/11/2019 Clear  Clear Final   • Specific Gravity, Urine 03/11/2019 1.017  1.003 - 1.030 Final   • Leukocytes, Urine 03/11/2019 Negative  Negative Final   • Nitrite, Urine 03/11/2019 Negative  Negative Final   • Protein, Urine 03/11/2019 Negative  Negative mg/dL Final   • Ketones, Urine 03/11/2019 Trace* Negative mg/dL Final   • Urobilinogen, Urine 03/11/2019 2.0* <2.0 E.U./dL Final   • Bilirubin, Urine 03/11/2019 Negative  Negative Final   • Blood, Urine 03/11/2019 Negative  Negative Final   • Glucose, Urine 03/11/2019 Negative  Negative mg/dL Final   • pH, Urine 03/11/2019 6.0  5.0 - 8.0 PH Final   • Blood Culture 03/11/2019 No Growth 24 hours   Preliminary   • Blood Culture 03/11/2019 No Growth 24 hours   Preliminary   • Albumin 03/11/2019 4.0  3.5 - 5.3 g/dL Final   • Total Bilirubin 03/11/2019 0.94  0.00 - 1.40 mg/dL Final   • Bilirubin, Direct 03/11/2019 0.13  0.00 - 0.20 mg/dL Final   • Alkaline Phosphatase 03/11/2019 56  55 - 142 U/L Final   • AST 03/11/2019 15  0 - 39 U/L Final   • ALT (SGPT)  03/11/2019 11  0 - 52 U/L Final   • Total Protein 03/11/2019 6.8  6.0 - 8.3 g/dL Final   • PMNs 03/11/2019 10-24 PMNs (+2)  /LPF Final   • Epithelial cells 03/11/2019 No Epithelial cells (0)  /LPF Final   • Mucus 03/11/2019 Mucus present (+1)   Final   • Organism Predominance 03/11/2019 Predominant organism present (+1)   Final   • OVERALL GRADE OF RESPIRATORY GRAM * 03/11/2019 >=+2, Acceptable specimen. Culture performed.  >=+2, Acceptable specimen. Culture performed. Final   • Gram Stain Interpretation 03/11/2019 Gram positive bacilli   Final   • POC Lactate 03/11/2019 0.68  0.50 - 1.99 mmol/L Final   • Adenovirus Detection by PCR 03/11/2019 Negative  Negative Final   • Coronavirus 229E Detection by PCR 03/11/2019 Negative  Negative Final   • Coronavirus HKU1 Detection by PCR 03/11/2019 Negative  Negative Final   • Coronavirus NL63  Detection by PCR 03/11/2019 Negative  Negative Final   • Coronavirus OC43 Detection by PCR 03/11/2019 Negative  Negative Final   • Human Metapneumovirus Detection by* 03/11/2019 Negative  Negative Final   • Rhinovirus/Enterovirus Detection b* 03/11/2019 Negative  Negative Final   • Influenza A  Detection by PCR 03/11/2019 Negative  Negative Final   • Influenza B Detection by PCR 03/11/2019 Negative  Negative Final   • Parainfluenza 1 Detection by PCR 03/11/2019 Negative  Negative Final   • Parainfluenza 2 Detection by PCR 03/11/2019 Negative  Negative Final   • Parainfluenza 3 Detection by PCR 03/11/2019 Negative  Negative Final   • Parainfluenza 4 Detection by PCR 03/11/2019 Negative  Negative Final   • Respiratory Syncytial Virus Detect* 03/11/2019 Negative  Negative Final   • Bordetella Pertussis Detection by * 03/11/2019 Negative  Negative Final   • Chlamydophila Pneumoniae Detection* 03/11/2019 Negative  Negative Final   • Mycoplasma pneumo by PCR 03/11/2019 Negative  Negative Final   • Bordetella Parapertussis Detection* 03/11/2019 Negative  Negative Final   • Strep pneumo Ag  03/11/2019 Negative  Negative Final   • Legionella Ag 03/11/2019 Negative  Negative Final   • Culture 03/11/2019 Culture in progress   Preliminary   • WBC 03/12/2019 12.0* 4.5 - 10.5 10*3/uL Final   • RBC 03/12/2019 4.20  3.70 - 5.30 10*6/µL Final   • Hemoglobin 03/12/2019 12.1  11.5 - 15.5 g/dL Final   • Hematocrit 03/12/2019 35.9  34.0 - 45.0 % Final   • MCV 03/12/2019 85.5  81.0 - 97.0 fL Final   • MCH 03/12/2019 28.8  28.0 - 33.0 pg Final   • MCHC 03/12/2019 33.7  32.0 - 36.0 g/dL Final   • RDW 03/12/2019 14.8* 11.5 - 14.0 % Final   • Platelets 03/12/2019 213  140 - 350 10*3/uL Final   • MPV 03/12/2019 8.4  6.9 - 10.8 fL Final   • Neutrophils% 03/12/2019 87* 41 - 81 % Final   • Lymphocytes% 03/12/2019 6* 11 - 47 % Final   • Monocytes% 03/12/2019 7  3 - 11 % Final   • Eosinophils% 03/12/2019 0  0 - 3 % Final   • Basophils% 03/12/2019 1  0 - 2 % Final   • Neutrophils Absolute 03/12/2019 10.40  10*3/uL Final   • Lymphocytes Absolute 03/12/2019 0.70  10*3/uL Final   • Monocytes Absolute 03/12/2019 0.80  10*3/uL Final   • Eosinophils Absolute 03/12/2019 0.00  10*3/uL Final   • Basophils Absolute 03/12/2019 0.10  10*3/uL Final   • Sodium 03/12/2019 138  135 - 145 mmol/L Final   • Potassium 03/12/2019 3.4* 3.5 - 5.1 mmol/L Final   • Chloride 03/12/2019 100  98 - 107 mmol/L Final   • CO2 03/12/2019 27  21 - 32 mmol/L Final   • BUN 03/12/2019 10  7 - 25 mg/dL Final   • Creatinine 03/12/2019 0.70  0.60 - 1.20 mg/dL Final   • Glucose 03/12/2019 133* 70 - 105 mg/dL Final   • Calcium 03/12/2019 8.8  8.6 - 10.3 mg/dL Final   • Anion Gap 03/12/2019 11  3 - 11 mmol/L Final   • eGFR 03/12/2019 82  >60 mL/min/1.73m*2 Final   • Magnesium 03/12/2019 1.9  1.8 - 2.4 mg/dL Final           ASSESSMENT & PLAN    1. Sepsis   - fluids have been D/C'ed due to fluid overload last night  - continue antibiotics  - repeat lactic acid if condition worsens    2. Pneumonia, Community Acquired  - Patient to be continued on IV Rocephin and IV  azithromycin for now.   - Blood cultures and sputum cultures are pending.    3. Atrial Fibrillation with rapid ventricular response  -At this time we will switch her to p.o. Cardizem and titrate off Cardizem drip.    4. Hypokalemia  - I will replete potassium via PO or IV  - There are no EKG changes such as T wave flattening  - I will check potassium level on morning BMP  - continuous cardiac monitoring     5. Hyperlipidemia  - stable  - will check fasting lipid panel in the morning  - continue home dose of statins  - no intervention or changes in this medication at this time          DVT Prophylaxis: eliquis      I have spent greater than 30 minutes in the follow up of this patient which includes face to face time with the patient (history taking, physical examination, education/counseling, and answering questions), progress note documentation, and medication reconciliation. I did have a long discussion with patient regarding overnight events, physical exam, laboratory data and plan of action. All questions and concerns were answered to this patient's satisfaction and level of understanding.       More than 50% = care coordination: YES    Reason for continued hospitalization: Cardizem drip, sepsis, pneumonia        Daniel Moya MD  , [facility]  Internal Medicine  Dept of Hospitalist Medicine  [facility]

## 2019-03-12 NOTE — INTERDISCIPLINARY/THERAPY
TIFFANY 5-2020    Met with patient and discussed CM role. Patient is from [place], she lives independently with no mobility needs. Patient states no history of home oxygen use, although, currently on 3LNC. Nursing staff will continue to wean. Patient states she will have a ride home. Will continue to follow.      Dispo:MICKI

## 2019-03-13 LAB
BACTERIA ISLT CULT: NORMAL
GLUCOSE BLDC GLUCOMTR-MCNC: 122 MG/DL (ref 70–105)

## 2019-03-13 PROCEDURE — 99232 SBSQ HOSP IP/OBS MODERATE 35: CPT | Performed by: NEUROLOGICAL SURGERY

## 2019-03-13 PROCEDURE — 97161 PT EVAL LOW COMPLEX 20 MIN: CPT | Mod: GP | Performed by: PHYSICAL THERAPIST

## 2019-03-13 PROCEDURE — 6370000100 HC RX 637 (ALT 250 FOR IP): Performed by: NEUROLOGICAL SURGERY

## 2019-03-13 PROCEDURE — 82962 GLUCOSE BLOOD TEST: CPT

## 2019-03-13 PROCEDURE — 6360000200 HC RX 636 W HCPCS (ALT 250 FOR IP): Performed by: HOSPITALIST

## 2019-03-13 PROCEDURE — (BLANK) HC ROOM ICU INTERMEDIATE

## 2019-03-13 PROCEDURE — 97530 THERAPEUTIC ACTIVITIES: CPT | Mod: GP | Performed by: PHYSICAL THERAPIST

## 2019-03-13 PROCEDURE — 2580000300 HC RX 258: Performed by: HOSPITALIST

## 2019-03-13 PROCEDURE — 6370000100 HC RX 637 (ALT 250 FOR IP): Performed by: HOSPITALIST

## 2019-03-13 RX ADMIN — ASPIRIN 81 MG: 81 TABLET ORAL at 08:44

## 2019-03-13 RX ADMIN — EZETIMIBE 10 MG: 10 TABLET ORAL at 08:44

## 2019-03-13 RX ADMIN — ACETAMINOPHEN 650 MG: 325 TABLET ORAL at 19:10

## 2019-03-13 RX ADMIN — DEXLANSOPRAZOLE 30 MG: 30 CAPSULE, DELAYED RELEASE ORAL at 15:53

## 2019-03-13 RX ADMIN — APIXABAN 5 MG: 5 TABLET, FILM COATED ORAL at 08:44

## 2019-03-13 RX ADMIN — DILTIAZEM HYDROCHLORIDE 60 MG: 60 TABLET, FILM COATED ORAL at 06:11

## 2019-03-13 RX ADMIN — DILTIAZEM HYDROCHLORIDE 180 MG: 180 CAPSULE, COATED, EXTENDED RELEASE ORAL at 08:55

## 2019-03-13 RX ADMIN — OXYCODONE HYDROCHLORIDE 5 MG: 5 TABLET ORAL at 03:08

## 2019-03-13 RX ADMIN — APIXABAN 5 MG: 5 TABLET, FILM COATED ORAL at 20:38

## 2019-03-13 RX ADMIN — Medication 1 SPRAY: at 22:45

## 2019-03-13 RX ADMIN — CEFTRIAXONE 2000 MG: 2 INJECTION, POWDER, FOR SOLUTION INTRAMUSCULAR; INTRAVENOUS at 05:39

## 2019-03-13 NOTE — PLAN OF CARE
Problem: Cardiovascular - Adult  Goal: Maintains optimal cardiac output and hemodynamic stability  Description  INTERVENTIONS:  1. Monitor vital signs and rhythm  2. Monitor for hypotension and other signs of decreased cardiac output  3. Administer and titrate ordered vasoactive medications to optimize hemodynamic stability  4. Monitor for fluid overload/dehydration, weight gain, shortness of breath and activity intolerance  5. Monitor arterial and/or venous puncture sites for bleeding and/or hematoma  6. Assess quality of pulses, capillary refill, edema, sensation, skin color and temperature  7. Assess for signs of decreased coronary artery perfusion - ex. angina  Outcome: Progressing     Problem: Cardiovascular - Adult  Goal: Absence of cardiac dysrhythmias or at baseline  Description  INTERVENTIONS:  1. Continuous cardiac monitoring, monitor vital signs, obtain 12 lead EKG if indicated  2. Administer antiarrhythmic and heart rate control medications as ordered  3. Initiate emergency measures for life threatening arrhythmias  4. Monitor electrolytes and administer replacement therapy as ordered  Outcome: Progressing     Problem: Respiratory - Adult  Goal: Achieves optimal ventilation and oxygenation  Description  INTERVENTIONS:  1. Assess for changes in respiratory status  2. Assess for changes in mentation and behavior  3. Position to facilitate oxygenation and minimize respiratory effort  4. Oxygen supplementation based on oxygen saturation or ABGs  5. Assess patient's ability to cough effectively  6. Encourage broncho-pulmonary hygiene including cough, deep breathe  7. Assess the need for suctioning   8. Assess and instruct to report SOB or any respiratory difficulty  9. Respiratory Therapy support as indicated, including medications and treatment.  Outcome: Progressing     Problem: Metabolic/Fluid and Electrolytes - Adult  Goal: Electrolytes maintained within normal limits  Description  INTERVENTIONS:  1.  Monitor labs and assess patient for signs and symptoms of electrolyte imbalances  2. Administer electrolyte replacement as ordered  3. Monitor response to electrolyte replacements, including repeat lab results as appropriate  4. Fluid restriction as ordered  5. Instruct patient on fluid and nutrition restrictions as appropriate  Outcome: Progressing     Problem: Metabolic/Fluid and Electrolytes - Adult  Goal: Maintain Optimal Renal Function and Hemodynamic Stability  Description  INTERVENTIONS:  1. Monitor labs and assess for signs and symptoms of volume excess or deficit  2. Monitor intake, output and patient weight  3. Monitor urine specific gravity, serum osmolarity and serum sodium as indicated or ordered  4. Monitor response to interventions for patient's volume status, including labs, urine output, blood pressure (other measures as available)  5. Encourage oral intake as appropriate  6. Instruct patient on fluid and nutrition restrictions as appropriate  Outcome: Progressing     Problem: Pain - Adult  Goal: Verbalizes/displays adequate comfort level or baseline comfort level  Description  INTERVENTIONS:  1. Encourage patient to monitor pain and request interventions  2. Assess pain using the appropriate pain scale  3. Administer analgesics based on type and severity of pain and evaluate response  4. Educate/Implement non-pharmacological measures as appropriate and evaluate response  5. Consider cultural, developmental and social influences on pain and pain management  6. Notify Provider if interventions unsuccessful or patient reports new pain  Outcome: Progressing     Problem: Infection - Adult  Goal: Absence of infection during hospitalization  Description  INTERVENTIONS:  1. Assess and monitor for signs and symptoms of infection  2. Monitor lab/diagnostic results  3. Monitor all insertion sites/wounds/incisions  4. Monitor secretions for changes in amount and color  5. Administer medications as ordered  6.  Educate and encourage patient and family to use good hand hygiene technique  7. Identify and educate in appropriate isolation precautions for identified infection/condition  Outcome: Progressing     Problem: Safety Adult  Goal: Patient will remain safe during hospitalization  Description  INTERVENTIONS    1. Assess patient for fall risk and implement interventions if needed  2. Use safe transport techniques  3. Assess patient using the Shamar skin assessment scale  4. Assess patient for risk of aspiration  5. Assess patient for risk of elopement  Outcome: Progressing     Problem: Safety Adult - Fall  Goal: Free from fall injury  Description  INTERVENTIONS:    Please reference Cares/Safety Flowsheet under [place] Fall Risk for interventions.  Outcome: Progressing

## 2019-03-13 NOTE — PLAN OF CARE
Problem: Cardiovascular - Adult  Goal: Maintains optimal cardiac output and hemodynamic stability  Description  INTERVENTIONS:  1. Monitor vital signs and rhythm  2. Monitor for hypotension and other signs of decreased cardiac output  3. Administer and titrate ordered vasoactive medications to optimize hemodynamic stability  4. Monitor for fluid overload/dehydration, weight gain, shortness of breath and activity intolerance  5. Monitor arterial and/or venous puncture sites for bleeding and/or hematoma  6. Assess quality of pulses, capillary refill, edema, sensation, skin color and temperature  7. Assess for signs of decreased coronary artery perfusion - ex. angina  Outcome: Progressing  Goal: Absence of cardiac dysrhythmias or at baseline  Description  INTERVENTIONS:  1. Continuous cardiac monitoring, monitor vital signs, obtain 12 lead EKG if indicated  2. Administer antiarrhythmic and heart rate control medications as ordered  3. Initiate emergency measures for life threatening arrhythmias  4. Monitor electrolytes and administer replacement therapy as ordered  Outcome: Progressing     Problem: Respiratory - Adult  Goal: Achieves optimal ventilation and oxygenation  Description  INTERVENTIONS:  1. Assess for changes in respiratory status  2. Assess for changes in mentation and behavior  3. Position to facilitate oxygenation and minimize respiratory effort  4. Oxygen supplementation based on oxygen saturation or ABGs  5. Assess patient's ability to cough effectively  6. Encourage broncho-pulmonary hygiene including cough, deep breathe  7. Assess the need for suctioning   8. Assess and instruct to report SOB or any respiratory difficulty  9. Respiratory Therapy support as indicated, including medications and treatment.  Outcome: Progressing  Goal: Achieves optimal ventilation and oxygenation with noninvasive CPAP/BiLEVEL support  Description  INTERVENTIONS:  1. Provide education to patient/family about rationale  and expected outcomes associated with therapy  2. Position patient to facilitate optimal ventilation/oxygenation status and minimize respiratory effort  3. Position patient to reduce aspiration risk, elevate head of bed at least 35 degrees or higher, as applicable  4. Assess effectiveness of therapy on ventilation/oxygenation status based on oxygen saturation and/or arterial blood gases, as indicated  5. Assess patient for changes in respiratory and physiological status  6. Auscultate breath sounds and assess chest excursion, as indicated  7. Assess patient for changes in mentation and behavior  8. Routinely monitor equipment for proper performance and settings  9. Assess and monitor skin condition, in relationship to the respiratory interface  10. Assure equipment alarm volume is adequate for the patient's environment  11. Immediately respond to equipment alarm, to assess patient and/or cause for alarm  12. Follow universal infection control/hospital policy(ies)/standards  Outcome: Progressing     Problem: Metabolic/Fluid and Electrolytes - Adult  Goal: Electrolytes maintained within normal limits  Description  INTERVENTIONS:  1. Monitor labs and assess patient for signs and symptoms of electrolyte imbalances  2. Administer electrolyte replacement as ordered  3. Monitor response to electrolyte replacements, including repeat lab results as appropriate  4. Fluid restriction as ordered  5. Instruct patient on fluid and nutrition restrictions as appropriate  Outcome: Progressing  Goal: Maintain Optimal Renal Function and Hemodynamic Stability  Description  INTERVENTIONS:  1. Monitor labs and assess for signs and symptoms of volume excess or deficit  2. Monitor intake, output and patient weight  3. Monitor urine specific gravity, serum osmolarity and serum sodium as indicated or ordered  4. Monitor response to interventions for patient's volume status, including labs, urine output, blood pressure (other measures as  available)  5. Encourage oral intake as appropriate  6. Instruct patient on fluid and nutrition restrictions as appropriate  Outcome: Progressing  Goal: Glucose maintained within prescribed range  Description  INTERVENTIONS:  1. Monitor blood glucose as ordered  2. Assess for signs and symptoms of hyperglycemia and hypoglycemia  3. Administer ordered medications to maintain glucose within target range  4. Assess barriers to adequate nutritional intake and initiate nutrition consult as needed  5. Assess baseline knowledge and provide education as indicated  6. Monitor exercise as may reduce the requirements for insulin  Outcome: Progressing     Problem: Pain - Adult  Goal: Verbalizes/displays adequate comfort level or baseline comfort level  Description  INTERVENTIONS:  1. Encourage patient to monitor pain and request interventions  2. Assess pain using the appropriate pain scale  3. Administer analgesics based on type and severity of pain and evaluate response  4. Educate/Implement non-pharmacological measures as appropriate and evaluate response  5. Consider cultural, developmental and social influences on pain and pain management  6. Notify Provider if interventions unsuccessful or patient reports new pain  Outcome: Progressing     Problem: Infection - Adult  Goal: Absence of infection during hospitalization  Description  INTERVENTIONS:  1. Assess and monitor for signs and symptoms of infection  2. Monitor lab/diagnostic results  3. Monitor all insertion sites/wounds/incisions  4. Monitor secretions for changes in amount and color  5. Administer medications as ordered  6. Educate and encourage patient and family to use good hand hygiene technique  7. Identify and educate in appropriate isolation precautions for identified infection/condition  Outcome: Progressing     Problem: Safety Adult  Goal: Patient will remain safe during hospitalization  Description  INTERVENTIONS    1. Assess patient for fall risk and  implement interventions if needed  2. Use safe transport techniques  3. Assess patient using the Shamar skin assessment scale  4. Assess patient for risk of aspiration  5. Assess patient for risk of elopement  Outcome: Progressing     Problem: Daily Care  Goal: Daily care needs are met  Description  INTERVENTIONS:   1. Assess and monitor skin integrity  2. Identify patients at risk for skin breakdown on admission and per policy  3. Assess and monitor ability to perform self care and identify potential discharge needs  4. Assess skin integrity/risk for skin breakdown  5. Assist patient with activities of daily living as needed  6. Encourage independent activity per ability   7. Provide mouth care   8. Include patient/family/caregiver in decisions related to daily care   Outcome: Progressing     Problem: Knowledge Deficit  Goal: Patient/family/caregiver demonstrates understanding of disease process, treatment plan, medications, and discharge instructions  Description  INTERVENTIONS:   1. Complete learning assessment and assess knowledge base  2. Provide teaching at level of understanding   3. Provide teaching via preferred learning methods  Outcome: Progressing     Problem: Discharge Barriers  Goal: Patient's discharge needs are met  Description  INTERVENTIONS:  1. Assess patient for self-management skills  2. Encourage participation in management  3. Identify potential discharge barriers on admission and throughout hospital stay  4. Involve patient/family/caregiver in discharge planning process  5. Collaborate with case management/ for discharge needs  Outcome: Progressing     Problem: Safety Adult - Fall  Goal: Free from fall injury  Description  INTERVENTIONS:    Please reference Cares/Safety Flowsheet under [place] Fall Risk for interventions.  Outcome: Progressing

## 2019-03-13 NOTE — PROGRESS NOTES
DATE OF SERVICE: 3/13/2019  TIME OF SERVICE: 7561-4651      Full Code    SUBJECTIVE:  - Patient was sitting in bedside chair when I walked into the room.  Patient's grandson, and daughter were at bedside.  Daughter made it very clear that she was a nurse as well, repeated this to me many times.  - patient's nurse present bedside  -States she has been short of breath since admission, states she feels she can breathe better while at rest, however gets very short of breath on ambulating.  - patient denies any complaints of chest pain, abdominal pain or nausea and vomiting  - No complaints of fevers, chills or night sweats.  - No issues with sleep overnight.    Prior to Admission medications    Medication Sig Start Date End Date Taking? Authorizing Provider   diltiazem CD (CARDIZEM CD) 180 mg 24 hr capsule Take 1 capsule (180 mg total) by mouth daily. 3/27/18  Yes Mildred Taylor CNP   Lactobacillus acidophilus (PROBIOTIC) 10 billion cell capsule Take 1 capsule by mouth daily     Yes Historical Provider, MD   aspirin 81 mg EC tablet Take 1 tablet (81 mg total) by mouth daily. 3/26/18  Yes WILDER Coello   apixaban (ELIQUIS) 5 mg tablet Take 5 mg by mouth 2 (two) times a day   6/22/17  Yes Historical Provider, MD   dexlansoprazole (DEXILANT) 30 mg capsule Take 30 mg by mouth daily   6/22/17  Yes Historical Provider, MD   INVESTIGATIONAL, CLEAR [id number], Bempedoic acid, ETC-1002, vs placebo tablet Take 1 tablet (180 mg total) by mouth daily 12/11/18   Balaji Grant CNP   ezetimibe (ZETIA) 10 mg tablet Take 1 tablet (10 mg total) by mouth daily.  Patient not taking: Reported on 11/30/2018 5/24/18 5/24/19  WILDER Coello       REVIEW OF SYSTEMS:     Other than what was mentioned in the subjective portion, a 14 point review of system is otherwise negative.     OBJECTIVE:    Patient Vitals for the past 24 hrs:   BP Temp Temp src Pulse Resp SpO2   03/13/19 1249 121/56 36.5 °C (97.7 °F) Oral 84 18 93 %    03/13/19 0839 133/63 36.6 °C (97.9 °F) Oral 90 18 95 %   03/13/19 0611 140/61 -- -- -- -- --   03/13/19 0310 135/56 36.8 °C (98.2 °F) Oral 83 18 97 %   03/12/19 2300 134/67 36.8 °C (98.2 °F) Oral 90 18 99 %   03/12/19 2100 144/70 37.1 °C (98.7 °F) Oral 88 18 95 %   03/12/19 2041 151/69 37 °C (98.6 °F) Oral 88 18 93 %   03/12/19 2000 143/64 (!) 38.7 °C (101.7 °F) Oral 98 18 99 %   03/12/19 1800 150/90 -- -- -- -- --   03/12/19 1615 135/88 -- -- 87 -- --   03/12/19 1600 145/77 -- -- 80 -- --   03/12/19 1545 137/89 -- -- 88 -- --   03/12/19 1532 130/63 37 °C (98.6 °F) Oral 86 20 92 %       HEENT:  HEAD: Normocephalic, atraumatic  EYES: PERRLA, EOMI, no scleral icterus  NOSE, MOUTH, THROAT: Mucous membranes are moist,  NECK: No JVD, neck is supple  CVS: S1, S2 heard. Normal rate, irregularly irregular rhythm. II/VI grade systolic murmur best heard in the aortic region. No rubs or gallops.  RESP: Patient has poor inspiratory effort. There poor air entry heard in both lung bases.  Rhonchi heard in right middle lung fields.  GI: Abdomen is flat, normal bowel sounds appreciated ×4, no tenderness on palpation  NEURO: Patient is alert and oriented ×4.  No focal neurological deficits appreciated.  EXTREMITIES: No upper or lower extremity edema. Good peripheral pulses. Warm temperature of both extremities.  MUSCULOSKELETAL: Normal range of motion  PSYCH: No depression      Admission on 03/11/2019   Component Date Value Ref Range Status   • WBC 03/11/2019 13.4* 4.5 - 10.5 10*3/uL Final   • RBC 03/11/2019 4.62  3.70 - 5.30 10*6/µL Final   • Hemoglobin 03/11/2019 13.5  11.5 - 15.5 g/dL Final   • Hematocrit 03/11/2019 40.6  34.0 - 45.0 % Final   • MCV 03/11/2019 87.8  81.0 - 97.0 fL Final   • MCH 03/11/2019 29.1  28.0 - 33.0 pg Final   • MCHC 03/11/2019 33.2  32.0 - 36.0 g/dL Final   • RDW 03/11/2019 14.4* 11.5 - 14.0 % Final   • Platelets 03/11/2019 241  140 - 350 10*3/uL Final   • MPV 03/11/2019 8.8  6.9 - 10.8 fL Final   •  Sodium 03/11/2019 132* 135 - 145 mmol/L Final   • Potassium 03/11/2019 3.8  3.5 - 5.1 mmol/L Final   • Chloride 03/11/2019 97* 98 - 107 mmol/L Final   • CO2 03/11/2019 24  21 - 32 mmol/L Final   • BUN 03/11/2019 14  7 - 25 mg/dL Final   • Creatinine 03/11/2019 0.82  0.60 - 1.20 mg/dL Final   • Glucose 03/11/2019 152* 70 - 105 mg/dL Final   • Calcium 03/11/2019 9.3  8.6 - 10.3 mg/dL Final   • Anion Gap 03/11/2019 11  3 - 11 mmol/L Final   • eGFR 03/11/2019 68  >60 mL/min/1.73m*2 Final   • Troponin I 03/11/2019 <0.030  <0.030 ng/mL Final   • RBC, Urine 03/11/2019 0-4  None seen, 0-4, Negative /HPF Final   • WBC, Urine 03/11/2019 0-4  0 - 4 /HPF Final   • Squamous Epithelial, Urine 03/11/2019 Negative  None Seen-9 /HPF Final   • Bacteria, Urine 03/11/2019 None seen  None seen, Few /HPF Final   • Color, Urine 03/11/2019 Yellow  Yellow Final   • Clarity, Urine 03/11/2019 Clear  Clear Final   • Specific Gravity, Urine 03/11/2019 1.017  1.003 - 1.030 Final   • Leukocytes, Urine 03/11/2019 Negative  Negative Final   • Nitrite, Urine 03/11/2019 Negative  Negative Final   • Protein, Urine 03/11/2019 Negative  Negative mg/dL Final   • Ketones, Urine 03/11/2019 Trace* Negative mg/dL Final   • Urobilinogen, Urine 03/11/2019 2.0* <2.0 E.U./dL Final   • Bilirubin, Urine 03/11/2019 Negative  Negative Final   • Blood, Urine 03/11/2019 Negative  Negative Final   • Glucose, Urine 03/11/2019 Negative  Negative mg/dL Final   • pH, Urine 03/11/2019 6.0  5.0 - 8.0 PH Final   • Blood Culture 03/11/2019 No growth at 48 hours   Preliminary   • Blood Culture 03/11/2019 No growth at 48 hours   Preliminary   • Albumin 03/11/2019 4.0  3.5 - 5.3 g/dL Final   • Total Bilirubin 03/11/2019 0.94  0.00 - 1.40 mg/dL Final   • Bilirubin, Direct 03/11/2019 0.13  0.00 - 0.20 mg/dL Final   • Alkaline Phosphatase 03/11/2019 56  55 - 142 U/L Final   • AST 03/11/2019 15  0 - 39 U/L Final   • ALT (SGPT) 03/11/2019 11  0 - 52 U/L Final   • Total Protein  03/11/2019 6.8  6.0 - 8.3 g/dL Final   • PMNs 03/11/2019 10-24 PMNs (+2)  /LPF Final   • Epithelial cells 03/11/2019 No Epithelial cells (0)  /LPF Final   • Mucus 03/11/2019 Mucus present (+1)   Final   • Organism Predominance 03/11/2019 Predominant organism present (+1)   Final   • OVERALL GRADE OF RESPIRATORY GRAM * 03/11/2019 >=+2, Acceptable specimen. Culture performed.  >=+2, Acceptable specimen. Culture performed. Final   • Gram Stain Interpretation 03/11/2019 Gram positive bacilli   Final   • POC Lactate 03/11/2019 0.68  0.50 - 1.99 mmol/L Final   • Adenovirus Detection by PCR 03/11/2019 Negative  Negative Final   • Coronavirus 229E Detection by PCR 03/11/2019 Negative  Negative Final   • Coronavirus HKU1 Detection by PCR 03/11/2019 Negative  Negative Final   • Coronavirus NL63  Detection by PCR 03/11/2019 Negative  Negative Final   • Coronavirus OC43 Detection by PCR 03/11/2019 Negative  Negative Final   • Human Metapneumovirus Detection by* 03/11/2019 Negative  Negative Final   • Rhinovirus/Enterovirus Detection b* 03/11/2019 Negative  Negative Final   • Influenza A  Detection by PCR 03/11/2019 Negative  Negative Final   • Influenza B Detection by PCR 03/11/2019 Negative  Negative Final   • Parainfluenza 1 Detection by PCR 03/11/2019 Negative  Negative Final   • Parainfluenza 2 Detection by PCR 03/11/2019 Negative  Negative Final   • Parainfluenza 3 Detection by PCR 03/11/2019 Negative  Negative Final   • Parainfluenza 4 Detection by PCR 03/11/2019 Negative  Negative Final   • Respiratory Syncytial Virus Detect* 03/11/2019 Negative  Negative Final   • Bordetella Pertussis Detection by * 03/11/2019 Negative  Negative Final   • Chlamydophila Pneumoniae Detection* 03/11/2019 Negative  Negative Final   • Mycoplasma pneumo by PCR 03/11/2019 Negative  Negative Final   • Bordetella Parapertussis Detection* 03/11/2019 Negative  Negative Final   • Strep pneumo Ag 03/11/2019 Negative  Negative Final   • Legionella Ag  03/11/2019 Negative  Negative Final   • Culture 03/11/2019 Normal respiratory blair   Final   • WBC 03/12/2019 12.0* 4.5 - 10.5 10*3/uL Final   • RBC 03/12/2019 4.20  3.70 - 5.30 10*6/µL Final   • Hemoglobin 03/12/2019 12.1  11.5 - 15.5 g/dL Final   • Hematocrit 03/12/2019 35.9  34.0 - 45.0 % Final   • MCV 03/12/2019 85.5  81.0 - 97.0 fL Final   • MCH 03/12/2019 28.8  28.0 - 33.0 pg Final   • MCHC 03/12/2019 33.7  32.0 - 36.0 g/dL Final   • RDW 03/12/2019 14.8* 11.5 - 14.0 % Final   • Platelets 03/12/2019 213  140 - 350 10*3/uL Final   • MPV 03/12/2019 8.4  6.9 - 10.8 fL Final   • Neutrophils% 03/12/2019 87* 41 - 81 % Final   • Lymphocytes% 03/12/2019 6* 11 - 47 % Final   • Monocytes% 03/12/2019 7  3 - 11 % Final   • Eosinophils% 03/12/2019 0  0 - 3 % Final   • Basophils% 03/12/2019 1  0 - 2 % Final   • Neutrophils Absolute 03/12/2019 10.40  10*3/uL Final   • Lymphocytes Absolute 03/12/2019 0.70  10*3/uL Final   • Monocytes Absolute 03/12/2019 0.80  10*3/uL Final   • Eosinophils Absolute 03/12/2019 0.00  10*3/uL Final   • Basophils Absolute 03/12/2019 0.10  10*3/uL Final   • Sodium 03/12/2019 138  135 - 145 mmol/L Final   • Potassium 03/12/2019 3.4* 3.5 - 5.1 mmol/L Final   • Chloride 03/12/2019 100  98 - 107 mmol/L Final   • CO2 03/12/2019 27  21 - 32 mmol/L Final   • BUN 03/12/2019 10  7 - 25 mg/dL Final   • Creatinine 03/12/2019 0.70  0.60 - 1.20 mg/dL Final   • Glucose 03/12/2019 133* 70 - 105 mg/dL Final   • Calcium 03/12/2019 8.8  8.6 - 10.3 mg/dL Final   • Anion Gap 03/12/2019 11  3 - 11 mmol/L Final   • eGFR 03/12/2019 82  >60 mL/min/1.73m*2 Final   • Magnesium 03/12/2019 1.9  1.8 - 2.4 mg/dL Final   • Potassium 03/12/2019 4.3  3.5 - 5.1 mmol/L Final   • POC Glucose 03/13/2019 122* 70 - 105 mg/dL Final           ASSESSMENT & PLAN    1. Sepsis   - IMPROVING  - continue antibiotics  - repeat lactic acid if condition worsens    2. Pneumonia, Community Acquired  - Patient to be continued on IV Rocephin  - Blood  cultures and sputum cultures are pending.    3. Atrial Fibrillation with rapid ventricular response  -At this time we will switch her to p.o. Cardizem and titrate off Cardizem drip.    4. Hypokalemia  - RESOLVED    5. Hyperlipidemia  - stable  - will check fasting lipid panel in the morning  - continue home dose of statins  - no intervention or changes in this medication at this time          DVT Prophylaxis: eliquis      I have spent greater than 30 minutes in the follow up of this patient which includes face to face time with the patient (history taking, physical examination, education/counseling, and answering questions), progress note documentation, and medication reconciliation. I did have a long discussion with patient regarding overnight events, physical exam, laboratory data and plan of action. All questions and concerns were answered to this patient's satisfaction and level of understanding.       More than 50% = care coordination: YES    Reason for continued hospitalization: Cardizem drip, sepsis, pneumonia.  Desaturated when ambulating earlier today.  Goal is to possibly discharge tomorrow.        Daniel Moya MD  , [facility]  Internal Medicine  Dept of Hospitalist Medicine  [facility]

## 2019-03-13 NOTE — INTERDISCIPLINARY/THERAPY
03/13/19 0927   PT Last Visit   PT Received On 03/13/19   Pain Assessment   Pain Assessment No/denies pain   General   Chart Reviewed Yes   Therapy Treatment Diagnosis afib   PT Treatment Duration (Min) 20 Minutes   Is this a Co-Treatment? No   Family/Caregiver Present Yes   Home Living   Type of Home House   Home Layout Multi-level   Home Access Stairs to enter with rails   Home Adaptive Equipment None   Prior Function   Level of [place] Independent with ADLs and functional transfers;Independent with homemaking with ambulation   Lived With Family;Other (Comment)  (grandson)   Receives Help From Family   ADL Assistance Independent   IADL Assistance Independent   Subjective Comments   Subjective Comments RN ok'd PT rx pt agreeable to participate, c/o weakness, denies pain.  Also c/o dizziness w/ activity.    Bed Mobility   Bed Mobility Not assessed   Transfers   Transfer Assessed   Transfer 1   Trials/Comments 1 Sit <>stand from chair w/ arms w/ cga 2x. VC for hand placement.  Gait belt and fww used.    Ambulation   Ambulation Assessed   Ambulation 1   Surface 1 Level surface;Smooth   Device 1 Front wheeled walker;Gait belt   Assistance 1 Contact guard assist x 2   Quality of Gait 1 slow, several pauses, attempted w/o fww at first as pt stated no needing it, and then upon 2nd attempt used fww d/t reaching for  furniture, etc.    Comments/Distance 1 8m   Balance   Balance Impaired  (d/t weakness/fatigue)   Other Activities   Other Activities Comments O2 @ 89% on RA at rest. Desat to 87% w/ a couple minutes of standing activity. Returned to 94% w/ 1 L.    Activity Tolerance   Position Standing   Standing Endurance Tolerates less than 10 min exercise with changes in vital signs   Activity Tolerance Comments slight desat w/ activity, requires O2.    Assessment   Rehab Potential Good   Problem List Decreased strength;Decreased endurance;Impaired balance;Decreased mobility   Assessment Comment Low activity kamini w/   impaired functional mobility requiring O2 and fww- neither of which she uses at baseline.    Recommendation   Recommendations for Therapy Unable to tolerate 3 hours therapy;Continue skilled therapy   Equipment Recommended Front wheeled walker   Plan   Treatment Interventions Bed mobility;Functional transfer training;Gait training;Stair training;Balance training;Endurance training;Therapeutic activities;Therapeutic exercises   PT Frequency 3-5x/wk   Plan Comment 2 assist,  adv mob and gait as kamini 8+m w/ fww. Wean from AD if appropriate.   Date POC Due 03/20/19

## 2019-03-14 VITALS
TEMPERATURE: 99 F | BODY MASS INDEX: 33.88 KG/M2 | SYSTOLIC BLOOD PRESSURE: 155 MMHG | OXYGEN SATURATION: 93 % | RESPIRATION RATE: 18 BRPM | HEIGHT: 62 IN | HEART RATE: 104 BPM | WEIGHT: 184.08 LBS | DIASTOLIC BLOOD PRESSURE: 66 MMHG

## 2019-03-14 PROCEDURE — 6360000200 HC RX 636 W HCPCS (ALT 250 FOR IP): Performed by: HOSPITALIST

## 2019-03-14 PROCEDURE — 99239 HOSP IP/OBS DSCHRG MGMT >30: CPT | Performed by: NEUROLOGICAL SURGERY

## 2019-03-14 PROCEDURE — 2580000300 HC RX 258: Performed by: HOSPITALIST

## 2019-03-14 PROCEDURE — 6370000100 HC RX 637 (ALT 250 FOR IP): Performed by: NEUROLOGICAL SURGERY

## 2019-03-14 RX ORDER — CIPROFLOXACIN 500 MG/1
500 TABLET ORAL 2 TIMES DAILY
Qty: 6 TABLET | Refills: 0 | Status: SHIPPED | OUTPATIENT
Start: 2019-03-14 | End: 2019-07-24 | Stop reason: HOSPADM

## 2019-03-14 RX ORDER — DOXYCYCLINE 100 MG/1
100 CAPSULE ORAL 2 TIMES DAILY
Qty: 6 CAPSULE | Refills: 0 | Status: SHIPPED | OUTPATIENT
Start: 2019-03-14 | End: 2019-07-24 | Stop reason: HOSPADM

## 2019-03-14 RX ADMIN — DILTIAZEM HYDROCHLORIDE 180 MG: 180 CAPSULE, COATED, EXTENDED RELEASE ORAL at 09:29

## 2019-03-14 RX ADMIN — CEFTRIAXONE 2000 MG: 2 INJECTION, POWDER, FOR SOLUTION INTRAMUSCULAR; INTRAVENOUS at 05:49

## 2019-03-14 RX ADMIN — ASPIRIN 81 MG: 81 TABLET ORAL at 09:30

## 2019-03-14 RX ADMIN — EZETIMIBE 10 MG: 10 TABLET ORAL at 09:30

## 2019-03-14 RX ADMIN — APIXABAN 5 MG: 5 TABLET, FILM COATED ORAL at 09:30

## 2019-03-14 NOTE — INTERDISCIPLINARY/THERAPY
CM -[id number]  Reviewed pt's plan of care.  Pt on room air at this time.  Plan is for pt to discharge home today.  No discharge needs at this time.  Dispo:  [facility]

## 2019-03-14 NOTE — DISCHARGE SUMMARY
Hospitalist Discharge Summary    Date of service: 03/14/19  Time of service: 1:19 PM    Admission Date: 3/11/2019   Admitting Provider: Jose Antonio Zarate MD    Discharge Date: 3/14/2019  Discharge Provider: Daniel Moya MD    Primary Care Provider at Discharge: BAKARI CABRAL [phone]         Discharge Disposition  Final discharge disposition not confirmed  Code Status at Discharge: Full Code  Diet: Resume home diet  Activity: Activity as tolerated    Discharge Diagnosis/Hospital Problems  Active Problems:    Atrial fibrillation with RVR (CMS/HCC) ([facility])           Discharge medication list      START taking these medications      Instructions   ciprofloxacin 500 mg tablet  Commonly known as:  CIPRO   Take 1 tablet (500 mg total) by mouth 2 (two) times a day for 3 days     doxycycline 100 mg capsule  Commonly known as:  MONODOX   Take 1 capsule (100 mg total) by mouth 2 (two) times a day for 3 days        CONTINUE taking these medications      Instructions   aspirin 81 mg EC tablet   Take 1 tablet (81 mg total) by mouth daily.     DEXILANT 30 mg capsule  Generic drug:  dexlansoprazole      diltiazem  mg 24 hr capsule  Commonly known as:  CARDIZEM CD   Take 1 capsule (180 mg total) by mouth daily.     ELIQUIS 5 mg tablet  Generic drug:  apixaban      ezetimibe 10 mg tablet  Commonly known as:  ZETIA   Take 1 tablet (10 mg total) by mouth daily.     INVESTIGATIONAL (CLEAR [id number]) Bempedoic acid (ETC-1002) vs placebo tablet   Take 1 tablet (180 mg total) by mouth daily     PROBIOTIC 10 billion cell capsule  Generic drug:  Lactobacillus acidophilus            Where to Get Your Medications      These medications [facility] [address] [place] [address] [facility]  [address] [facility] [address]    Phone:  [phone]   · ciprofloxacin 500 mg tablet  · doxycycline 100 mg capsule           Outpatient Follow-Up  Future Appointments   Date Time Provider Department Center    4/8/2019  8:30 AM RESEARCH FOLLOW UP [facility]   4/9/2019  9:50 AM WILDER Coello [facility]    7/9/2019  8:30 AM RESEARCH FOLLOW UP [facility]   1/6/2020  8:00 AM RESEARCH FOLLOW UP [facility]       Test Results Pending at Discharge   Order Current Status    Blood Cultures, 2 Sets Preliminary result    Blood culture, 1 set Preliminary result    Blood culture, 1 set Preliminary result              DETAILS OF HOSPITAL STAY    Presenting Problem/History of Present Illness  Weakness [R53.1]  Atrial fibrillation with RVR (CMS/HCC) (HCC) [I48.91]      Hospital Course    This is a pleasant 78-year-old female with a past history of atrial fibrillation who presented to the hospital on 3/11/2019 with complaints of fatigue and shortness of breath.  She was admitted for sepsis secondary to community-acquired pneumonia, as well as atrial fibrillation with rapid ventricular response requiring a Cardizem drip.  She was started on Rocephin and azithromycin, continued on IV Cardizem, supplemental oxygen and IV fluids.  She is now been weaned down to room air, is doing well as far as ventricular rate goes on p.o. Cardizem and will be discharged on p.o. antibiotics for 3 additional days.      Physical Exam at Discharge  Discharge Condition: fair  Heart Rate: 104  Resp: 18  BP: 155/66  Temp: 37.2 °C (99 °F)  Weight: 83.5 kg (184 lb 1.4 oz)  Physical Exam    HEENT:  HEAD: Normocephalic, atraumatic  EYES: PERRLA, EOMI, no scleral icterus  NOSE, MOUTH, THROAT: Mucous membranes are moist,  NECK: No JVD, neck is supple  CVS: S1, S2 heard. Normal rate, irregularly irregular rhythm. No murmurs rubs or gallops.  RESP: Patient has good inspiratory effort. There is good air entry heard in both lung bases. Lungs are clear to auscultation.  GI: Abdomen is flat, normal bowel sounds appreciated ×4, no tenderness on palpation  NEURO: Patient is alert and oriented ×4.  No focal neurological deficits appreciated.  EXTREMITIES: No  upper or lower extremity edema. Good peripheral pulses. Warm temperature of both extremities.  MUSCULOSKELETAL: Normal range of motion  PSYCH: No depression    Time spent coordinating discharge: 32 min    PABLITO VALLADARES MD    Date: 03/14/19  Time: 1:19 PM

## 2019-03-16 LAB
BACTERIA BLD CULT: NORMAL
BACTERIA BLD CULT: NORMAL

## 2019-04-08 ENCOUNTER — CLINICAL SUPPORT (OUTPATIENT)
Dept: RESEARCH | Facility: OTHER | Age: 79
End: 2019-04-08
Payer: MEDICARE

## 2019-04-08 VITALS
WEIGHT: 179 LBS | BODY MASS INDEX: 32.74 KG/M2 | SYSTOLIC BLOOD PRESSURE: 154 MMHG | DIASTOLIC BLOOD PRESSURE: 69 MMHG | HEART RATE: 74 BPM

## 2019-04-08 DIAGNOSIS — Z00.6 RESEARCH SUBJECT: ICD-10-CM

## 2019-04-08 PROCEDURE — 3700: Mod: RPD | Performed by: NURSE PRACTITIONER

## 2019-04-08 NOTE — RESEARCH NOTE
Active Trial:CLEAR  Name: Belkis Burris  : 1940  Age:  78 y.o.  Sex: female    Appt. Location:  CLINICAL RESEARCH   CLINICAL RESEARCH  [address] [place] [id number] [phone]      Trial Information:   Visit Summary: Patient presented to the office today for Visit 3/M3 for the CLEAR Trial.  Belkis reports doing well.  She has recovered from her hospitalization in March.  She was hospitalized for sepsis and pneumonia (previously reported).     Medications were reviewed and she denies any changes.  Adverse events were reviewed and reconciled.  She denies any Cardiac or heart failure events.    Belkis was counseled on a heart healthy diet and she does participate in a exercise program.    OLD IP was returned and NEW IP was dispensed.  She was found to be compliant with her study medication.    She was thanked for her participation in research and given her next scheduled appointment.      Study Drug Compliance:   Patient is compliant with Study drug? Yes      Events:    Adverse Events: None reported     Serious Adverse Events: Sepsis from pneumonia  Hospitalized 3/11/19-3/14/19    Outcome Events: None reported     Labs:  CENTRAL LABS DRAWN AND AVAILABLE IN THE LAB TAB

## 2019-04-09 ENCOUNTER — TELEPHONE (OUTPATIENT)
Dept: CARDIOLOGY | Facility: CLINIC | Age: 79
End: 2019-04-09

## 2019-04-09 ENCOUNTER — OFFICE VISIT (OUTPATIENT)
Dept: CARDIOLOGY | Facility: CLINIC | Age: 79
End: 2019-04-09
Payer: MEDICARE

## 2019-04-09 VITALS
DIASTOLIC BLOOD PRESSURE: 82 MMHG | WEIGHT: 172.5 LBS | BODY MASS INDEX: 31.74 KG/M2 | SYSTOLIC BLOOD PRESSURE: 123 MMHG | OXYGEN SATURATION: 98 % | HEIGHT: 62 IN | HEART RATE: 90 BPM

## 2019-04-09 DIAGNOSIS — I73.9 PAD (PERIPHERAL ARTERY DISEASE) (CMS/HCC): Primary | ICD-10-CM

## 2019-04-09 PROCEDURE — G0463 HOSPITAL OUTPT CLINIC VISIT: HCPCS | Mod: PO | Performed by: PHYSICIAN ASSISTANT

## 2019-04-09 PROCEDURE — 99214 OFFICE O/P EST MOD 30 MIN: CPT | Performed by: PHYSICIAN ASSISTANT

## 2019-04-09 ASSESSMENT — ENCOUNTER SYMPTOMS
MYALGIAS: 1
SHORTNESS OF BREATH: 1
BRUISES/BLEEDS EASILY: 1

## 2019-04-09 ASSESSMENT — PAIN SCALES - GENERAL: PAINLEVEL: 0-NO PAIN

## 2019-04-09 NOTE — PROGRESS NOTES
[facility]  [address] [place] [id number]  [phone]      Cardiology Outpatient Progress Note      Subjective      Patient ID: Belkis Burris is a 78 y.o. female.    Chief Complaint:   Chief Complaint   Patient presents with   • Atrial Fibrillation   • PAD- Lower   • PAD Upper   .      HPI  Patient is a 78-year-old female who is a patient of Dr. Mosers.  History includes carotid artery stenosis with left internal carotid artery stent with in-stent restenosis, right internal carotid artery occlusion, lower extremity peripheral arterial disease right mid SFA that is moderate in severity, questionable left SFA stenosis by history of CTA but not displayed on the last arterial duplex.  Permanent atrial fibrillation, dyslipidemia, intolerance to statins.    Patient presents for yearly follow-up for her peripheral arterial disease and carotid artery stenosis.  She reports that she continues to get leg weakness that his whole legs sensation.  They are bilateral.  Left equals right.  This is been present ever since her acute limb ischemia.  She can walk for about 3-5 blocks before she has to stop and rest.  She denies any rest pain.  Denies any ulcerations.  However she does keep active walking at the mall for exercise and outside.  She is walking about 45 minutes every day.    Regarding her atrial fibrillation she denies any palpitations or racing heart.  Tolerating anticoagulation    Regarding her carotid artery stenosis she denies any TIA or strokelike symptoms    Patient denies any chest pain, arm, shoulder, neck, jaw, intrascapular pain, abnormal shortness of breath, dyspnea on exertion, orthopnea, paroxysmal nocturnal dyspnea, new or worsening lower extremity edema, abdominal swelling, decreased appetite, early siety, rapid fluid weight gain, presyncope, syncope, palpitations, racing heart, excessive diaphoresis, excessive fatigue, hematochezia, melena,  claudication, lower  extremity ulcerations, rest pain of the lower extremities, gangrenous changes to the feet or toes, TIA or stroke-like symptoms including hemiparesthesias, hemiparalysis, slurred speech, visual loss.    Last updated by Patricia Colin LPN on 3/25/18    Follow up (PAD) last office visit with vascular on 8/22/17. Saw Mildred in EP on 3/23/18. Needs carotid duplex and art duplex with ABIs (per your office note on 8/22/17.)      ARRHYTHMIA: A Fib / Flutter    PVD:  1) Stroke of internal carotid artery  2) Acute occlusion R common femoral artery [Mechanical aspiration/thrombectomy] - 5/26/2017  3)LICA stent on 7/11/17, known occluded VIRI - subsequent stroke.     Past Medical History:   Diagnosis Date   • A-fib (CMS/HCC) (HCC)    • A-fib (CMS/HCC) (HCC)    • DVT (deep venous thrombosis) (CMS/HCC) (HCC)    • Hypercholesteremia        Past Surgical History:   Procedure Laterality Date   • CAROTID STENT     • TUBAL LIGATION         Allergies as of 04/09/2019 - Reviewed 03/11/2019   Allergen Reaction Noted   • Amoxicillin  05/26/2017   • Metronidazole  05/26/2017   • Sulfa (sulfonamide antibiotics)  05/26/2017       Current Outpatient Medications   Medication Sig Dispense Refill   • INVESTIGATIONAL, CLEAR [id number], Bempedoic acid, ETC-1002, vs placebo tablet Take 1 tablet (180 mg total) by mouth daily 35 tablet 0   • diltiazem CD (CARDIZEM CD) 180 mg 24 hr capsule Take 1 capsule (180 mg total) by mouth daily. 90 capsule 3   • Lactobacillus acidophilus (PROBIOTIC) 10 billion cell capsule Take 1 capsule by mouth daily       • aspirin 81 mg EC tablet Take 1 tablet (81 mg total) by mouth daily. 30 tablet 11   • apixaban (ELIQUIS) 5 mg tablet Take 5 mg by mouth 2 (two) times a day       • dexlansoprazole (DEXILANT) 30 mg capsule Take 30 mg by mouth daily       • ezetimibe (ZETIA) 10 mg tablet Take 1 tablet (10 mg total) by mouth daily. (Patient not taking: Reported on 11/30/2018 ) 30 tablet 11     No current  facility-administered medications for this visit.        Family History   Problem Relation Age of Onset   • Other Mother         Complications of TB and emphysema, Cause of Death   • Blood Clots Father         Cause of Death   • Other Sister         Carotid endarterectomy   • Other Brother         Carotid endarterectomy       Social History     Tobacco Use   • Smoking status: Former Smoker     Packs/day: 0.50     Years: 10.00     Pack years: 5.00     Types: Cigarettes     Last attempt to quit: 2017     Years since quittin.2   • Smokeless tobacco: Never Used   Substance Use Topics   • Alcohol use: No   • Drug use: No         Review of Systems  Review of Systems   Cardiovascular: Negative for chest pain.   Respiratory: Positive for shortness of breath.    Hematologic/Lymphatic: Bruises/bleeds easily.   Musculoskeletal: Positive for joint pain and myalgias.   Genitourinary: Positive for nocturia.     10 point review of systems performed. Positives listed and all others negative.     Objective     Vitals:    19 1004   BP: 123/82   Pulse: 90   SpO2: 98%     Weight: 78.2 kg (172 lb 8 oz)    Physical Exam   Constitutional: She is oriented to person, place, and time. She appears well-developed and well-nourished. She is cooperative.   HENT:   Head: Normocephalic and atraumatic.   Eyes: Pupils are equal, round, and reactive to light. EOM are normal.   Neck: No hepatojugular reflux and no JVD present. Carotid bruit is present.   Cardiovascular: Normal rate, S1 normal, S2 normal and intact distal pulses. An irregularly irregular rhythm present. Exam reveals no S3, no S4 and no friction rub.   Murmur heard.   Systolic murmur is present with a grade of 1/6.  Pulmonary/Chest: Effort normal and breath sounds normal. No respiratory distress. She has no decreased breath sounds. She has no wheezes. She has no rhonchi. She has no rales.   Abdominal: Soft. She exhibits no distension, no abdominal bruit and no ascites. There  is no tenderness.   Musculoskeletal: She exhibits no edema.   Neurological: She is alert and oriented to person, place, and time. No cranial nerve deficit.   Skin: Skin is warm and dry.   Psychiatric: She has a normal mood and affect. Her speech is normal and behavior is normal.         Lipid:   Lab Results   Component Value Date    CHOL 279 01/08/2019    CHOL 155 05/28/2017    HDL 46 01/08/2019    HDL 28 (L) 05/28/2017    TRIG 137 01/08/2019    TRIG 153 (H) 05/28/2017    LDLCALC 206 01/08/2019    LDLCALC 96 05/28/2017         Assessment/Plan   Diagnoses and all orders for this visit:    PAD (peripheral artery disease) (CMS/HCC) ([facility])  -     CT angiogram abdominal aorta and bilateral lower extremity runoff; Future  -     US Carotid duplex bilateral; Future  -     US Carotid duplex bilateral; Future  Right moderate mid SFA stenosis by arterial duplex from last year.  Remotely there is questionable left SFA stenosis by CTA in the past  Her symptoms of weakness and present ever since her acute limb ischemia.  Typical and atypical features of vascular claudication.  We will get a CTA of the abdomen and pelvis with runoff for further evaluation as this was demonstrated on previous CAT scan imaging.  Continue with aspirin.    Carotid artery stenosis  History left ICA stent with a right internal carotid artery occlusion  Due for surveillance  She did have a 20% in-stent restenosis of the left ICA stent from the 4/25/2018 carotid duplex  Continue with aspirin.  Intolerant of statin therapy.  Continue with the CLEAR trial     Permanent atrial fibrillation  [id number] score greater than 2  On anticoagulation  Continue with anticoagulation and diltiazem        Follow up with Addy Cox PA-C in 1 year or sooner if needed.       Electronically signed by: WILDER WAYNE  4/9/2019  3:04 PM    A voice recognition program was used to aid in documentation of this record. Sometimes words are not presented exactly as they  were spoken.  While efforts were made to carefully edit and correct any inaccuracies, some errors may be present.  Please take this into context.  Please contact the provider if errors are identified.

## 2019-04-09 NOTE — TELEPHONE ENCOUNTER
Belkis called back to the clinic after her clinic visit today. She had forgot to ask you about getting a pneumonia shot, since she just had pneumonia. If you can advice and I will call her back.

## 2019-04-11 LAB
EXTERNAL ALANINE AMINOTRANSFERASE (SGPT) (U/L) IN SER/PLAS: 17 U/L
EXTERNAL ALBUMIN (G/DL) IN SER/PLAS: 4.3 G/DL
EXTERNAL ALKALINE PHOSPHATASE (U/L) IN SER/PLAS: 69 U/L
EXTERNAL ASPARTATE AMINOTRANSFERASE (SGOT) (U/L) IN SER/PLAS: 18 U/L
EXTERNAL BASOPHILS %: 0.4 %
EXTERNAL BASOPHILS ABSOLUTE: 0.03 10*3/UL
EXTERNAL BILIRUBIN TOTAL (MG/DL) IN SER/PLAS: 0.5 MG/DL
EXTERNAL CALCIUM (MG/DL) IN SER/PLAS: 9.3 MG/DL
EXTERNAL CHLORIDE (MMOL/L) IN SER/PLAS: 100 MMOL/L
EXTERNAL CREATININE (MG/DL) IN SER/PLAS: 0.81 MG/DL
EXTERNAL EOSINOPHILS %: 3.3 %
EXTERNAL EOSINOPHILS ABSOLUTE: 0.24 10*3/UL
EXTERNAL GLOMERULAR FILTRATION RATE ML/MIN/1.73 SQ M.PREDICTED: 68 ML/MIN/1.73M*2
EXTERNAL GLUCOSE (MG/DL) IN SER/PLAS: 98 MG/DL
EXTERNAL HEMATOCRIT: 39.2 %
EXTERNAL HEMOGLOBIN (BASE NAME HGB): 12.9 G/DL
EXTERNAL LEUKOCYTES(10*3/UL) IN BLOOD BY AUTOMATED COUNT: 7.3 10*3/ML
EXTERNAL MCH: 30 PG
EXTERNAL MCHC: 33 G/DL
EXTERNAL MCV: 90 FL
EXTERNAL MONOCYTES ABSOLUTE: 0.42 10*3/UL
EXTERNAL NEUTROPHILS %: 64 %
EXTERNAL NEUTROPHILS (10*3/UL) IN BLOOD BY AUTOMATED COUNT: 4.67 10*3/UL
EXTERNAL PLATELET COUNT: 259 10*3/UL
EXTERNAL POTASSIUM (MMOL/L) IN SER/PLAS: 4.3 MMOL/L
EXTERNAL PROTEIN (G/DL) IN SER/PLAS: 6.6 G/DL
EXTERNAL RBC: 4.4 10*6/ΜL
EXTERNAL SODIUM (MMOL/L) IN SER/PLAS: 138 MMOL/L
EXTERNAL UREA NITROGEN (MG/DL) IN SER/PLAS: 15 MG/DL
HBA1C MFR BLD: 5.7 %

## 2019-05-08 ENCOUNTER — ANCILLARY PROCEDURE (OUTPATIENT)
Dept: CARDIOLOGY | Facility: CLINIC | Age: 79
End: 2019-05-08
Payer: MEDICARE

## 2019-05-08 ENCOUNTER — TELEPHONE (OUTPATIENT)
Dept: CARDIOLOGY | Facility: CLINIC | Age: 79
End: 2019-05-08

## 2019-05-08 VITALS — SYSTOLIC BLOOD PRESSURE: 114 MMHG | DIASTOLIC BLOOD PRESSURE: 80 MMHG

## 2019-05-08 DIAGNOSIS — I73.9 PAD (PERIPHERAL ARTERY DISEASE) (CMS/HCC): ICD-10-CM

## 2019-05-08 PROCEDURE — 93880 EXTRACRANIAL BILAT STUDY: CPT | Mod: 26 | Performed by: INTERNAL MEDICINE

## 2019-05-08 PROCEDURE — 93880 EXTRACRANIAL BILAT STUDY: CPT | Mod: PO

## 2019-05-08 NOTE — TELEPHONE ENCOUNTER
Spoke with pt while she was at the [facility] clinic to schedule CTA, arrive at the main entrance of [facility] on 5/10 at 0745, the CTA will begin at approximately 0800, all CT suitability questions negative.  Pt stated understanding and repeated back instructions.

## 2019-05-10 ENCOUNTER — HOSPITAL ENCOUNTER (OUTPATIENT)
Dept: CT IMAGING | Facility: HOSPITAL | Age: 79
Discharge: HOME | End: 2019-05-10
Payer: MEDICARE

## 2019-05-10 DIAGNOSIS — I73.9 PAD (PERIPHERAL ARTERY DISEASE) (CMS/HCC): ICD-10-CM

## 2019-05-10 PROCEDURE — 2550000100 HC RX 255: Mod: JW | Performed by: PHYSICIAN ASSISTANT

## 2019-05-10 PROCEDURE — 75635 CT ANGIO ABDOMINAL ARTERIES: CPT

## 2019-05-10 RX ORDER — IOPAMIDOL 755 MG/ML
140 INJECTION, SOLUTION INTRAVASCULAR ONCE
Status: COMPLETED | OUTPATIENT
Start: 2019-05-10 | End: 2019-05-10

## 2019-05-10 RX ADMIN — IOPAMIDOL 140 ML: 755 INJECTION, SOLUTION INTRAVENOUS at 09:00

## 2019-05-17 LAB
LEFT CCA DIST DIAS: 28 CM/S
LEFT CCA DIST SYS: 112 CM/S
LEFT CCA MID DIAS: 33 CM/S
LEFT CCA MID SYS: 130 CM/S
LEFT CCA PROX DIAS: 34 CM/S
LEFT CCA PROX SYS: 124 CM/S
LEFT ECA DIAS: 23 CM/S
LEFT ECA SYS: 221 CM/S
LEFT ICA DIST DIAS: 43 CM/S
LEFT ICA DIST SYS: 130 CM/S
LEFT ICA MID DIAS: 41 CM/S
LEFT ICA MID SYS: 104 CM/S
LEFT ICA PROX DIAS: 25 CM/S
LEFT ICA PROX SYS: 94 CM/S
LEFT ICA/CCA SYS: 0.93
LEFT VERTEBRAL DIAS: 14 CM/S
LEFT VERTEBRAL SYS: 48 CM/S
RH LEFT CAROTID BULB EDV: 23 CM/S
RH LEFT CAROTID BULB PSV: 109 CM/S
RH RIGHT CAROTID BULB EDV: 57 CM/S
RH RIGHT CAROTID BULB PSV: 203 CM/S
RH RIGHT CAROTID BULB/CCA: 2
RIGHT CCA DIST DIAS: 16 CM/S
RIGHT CCA DIST SYS: 102 CM/S
RIGHT CCA MID DIAS: 7 CM/S
RIGHT CCA MID SYS: 42 CM/S
RIGHT CCA PROX DIAS: 5 CM/S
RIGHT CCA PROX SYS: 21 CM/S
RIGHT ECA DIAS: 17 CM/S
RIGHT ECA SYS: 106 CM/S
RIGHT ICA DIST SYS: 0
RIGHT ICA MID SYS: 0
RIGHT ICA PROX SYS: 0
RIGHT SUBCLAVIAN SYS: 285
RIGHT VERTEBRAL SYS: 0

## 2019-05-23 ENCOUNTER — TELEPHONE (OUTPATIENT)
Dept: CARDIOTHORACIC SURGERY | Facility: CLINIC | Age: 79
End: 2019-05-23

## 2019-05-23 NOTE — TELEPHONE ENCOUNTER
----- Message from WILDER Coello sent at 5/21/2019 10:00 AM MDT -----  Please let the patient know that her carotid duplex shows occlusion of the right internal carotid artery.  Left ICA stent is patent.  In comparison to the previous carotid duplex there does not appear to be a significant in-stent restenosis as previously described.  We will continue to monitor with serial surveillance imaging in the future    Patient was called, and test results were given that the right internal carotid artery is now less than 20% occluded. And left ICA is patent. She is to continue on her medical therapy, with no medication changes.

## 2019-06-03 ENCOUNTER — PREP FOR CASE (OUTPATIENT)
Dept: CARDIOLOGY | Facility: HOSPITAL | Age: 79
End: 2019-06-03

## 2019-06-03 ENCOUNTER — TELEPHONE (OUTPATIENT)
Dept: CARDIOLOGY | Facility: CLINIC | Age: 79
End: 2019-06-03

## 2019-06-03 DIAGNOSIS — I70.203 ATHEROSCLEROSIS OF NATIVE ARTERY OF BOTH LOWER EXTREMITIES, WITH UNSPECIFIED PRESENCE OF CLINICAL MANIFESTATION (CMS/HCC): Primary | ICD-10-CM

## 2019-06-03 NOTE — TELEPHONE ENCOUNTER
MD: Dr Gallardo    Case:  AIFF    Date/Time Request:  7/24/19 late #[id number]    Anesthesia:  RN    PAT visit: Yes, to be scheduled     TESTS:  CBC, BMP    ALLERGY TO IODINE?   No    Pre Fluids:   No    BUN/Creatinine:   15     0.81                       GFR:  68    Weight:   78.2   Kg  _______________________________________________    ADDITIONAL INSTRUCTIONS/NEEDS  NPO after midnight the day of your procedure.  You may take your morning medications with a sip of water EXCEPT any vitamins/supplements or any diuretics.    Other medication instructions:  Hold eliquis 24 hours prior to procedure    You may be discharged later the day of your procedure but be prepared to spend the night if necessary.  If you are discharged the same day, you are required to have a  and someone to spend the night with you.          Comments:  Email sent to scheduling.

## 2019-06-18 ENCOUNTER — TELEPHONE (OUTPATIENT)
Dept: CARDIOLOGY | Facility: CLINIC | Age: 79
End: 2019-06-18

## 2019-06-21 ENCOUNTER — TELEPHONE (OUTPATIENT)
Dept: CARDIOLOGY | Facility: CLINIC | Age: 79
End: 2019-06-21

## 2019-06-21 NOTE — TELEPHONE ENCOUNTER
Spoke with pt to review instructions for AIFF on  7/24 with Dr. Gallardo--arrive at the main entrance of [facility] at  0615  for preadmit labs/teaching, the procedure will begin at approximately 0830.     ADDITIONAL INSTRUCTIONS/NEEDS per Radha LEIJA RN:    NPO after midnight the day of your procedure.      You may take your morning medications with a sip of water EXCEPT any vitamins/supplements or any diuretics.      Other medication instructions:   Hold eliquis 24 hours prior to procedure      You may be discharged later the day of your procedure but be prepared to spend the night if necessary.   If you are discharged the same day, you are required to have a  and someone to spend the night with you.         Pt stated understanding and repeated back instructions..

## 2019-06-21 NOTE — TELEPHONE ENCOUNTER
Patient's daughter requesting letter for patient's insurance company that upcoming AIFF on 7/24/19 is not urgent/emergent but necessary for the treatment of the patient's peripheral arterial disease.  I told her Dr Gallardo isn't in the clinic until 7/3 but that Addy Cox would be in next week and I would ask him to write this.  Daughter would like a phone call when it is complete and she will pick it up.  [phone] (Bee)

## 2019-06-21 NOTE — TELEPHONE ENCOUNTER
Spoke with pt to review instructions for Aiff on 7/24 with Dr. Gallardo--she was busy but will call me back later today.

## 2019-06-25 ENCOUNTER — TELEPHONE (OUTPATIENT)
Dept: CARDIOLOGY | Facility: CLINIC | Age: 79
End: 2019-06-25

## 2019-06-25 NOTE — TELEPHONE ENCOUNTER
Left voicemail with daughter that the requested letter is ready for her to  as requested at the  [facility] [place].

## 2019-07-09 ENCOUNTER — CLINICAL SUPPORT (OUTPATIENT)
Dept: RESEARCH | Facility: OTHER | Age: 79
End: 2019-07-09
Payer: MEDICARE

## 2019-07-09 VITALS
SYSTOLIC BLOOD PRESSURE: 134 MMHG | DIASTOLIC BLOOD PRESSURE: 78 MMHG | BODY MASS INDEX: 31.46 KG/M2 | WEIGHT: 172 LBS | HEART RATE: 79 BPM

## 2019-07-09 DIAGNOSIS — Z00.6 RESEARCH SUBJECT: ICD-10-CM

## 2019-07-09 PROCEDURE — 3700 RSCH CLEAR T4 MONTH 6: Mod: RPD | Performed by: NURSE PRACTITIONER

## 2019-07-09 PROCEDURE — 3700 RSCH CLEAR RECONSENT (COMPLETE SUPPLEMTNAL CHARGE SHEET): Performed by: NURSE PRACTITIONER

## 2019-07-09 NOTE — RESEARCH NOTE
Active Trial: Clear    Name: Belkis Burris  : 1940  Age:  78 y.o.  Sex: female    Appt. Location:  CLINICAL RESEARCH   CLINICAL RESEARCH  [address] [place] [id number] [phone]      Trial Information:   Visit Summary:     Belkis is here today to complete her T4/M6 clinic follow up visit for the Clear Trial. We last met with her in the clinic back in April. Since that time she has been doing pretty well.      She was consented with [id number]. This consent was thoroughly reviewed with her. She was given plenty of time to review the consent and ask questions. All questions were answered to her satisfaction. She agreed, signed the consent and was provided with a copy.      Fasting labs and vitals were obtained without complications.      Medications and adverse events were reviewed and reconciled. She denies any new or worsening events.      She returned her old IP. She was found to be compliant with her IP. Education regarding IP administration was reviewed. She verbalized understanding. IVRS was contacted and IP was dispensed. IP was verified and dispensed to the subject.       She was thanked for her continued participation in research. She was encouraged to call with any questions or concerns. We will see her back in the clinic in 6 months.       Study Drug Compliance:   Patient is compliant with Study drug? Yes      Events:    Adverse Events: None Reported    Serious Adverse Events: None Reported    Outcome Events: None Reported    Labs:  CENTRAL LABS DRAWN AND AVAILABLE IN THE LAB TAB

## 2019-07-11 LAB
EXTERNAL ALANINE AMINOTRANSFERASE (SGPT) (U/L) IN SER/PLAS: 14 U/L
EXTERNAL ALBUMIN (G/DL) IN SER/PLAS: 4.5 G/DL
EXTERNAL ALKALINE PHOSPHATASE (U/L) IN SER/PLAS: 60 U/L
EXTERNAL ASPARTATE AMINOTRANSFERASE (SGOT) (U/L) IN SER/PLAS: 18 U/L
EXTERNAL BASOPHILS %: 0.4 %
EXTERNAL BASOPHILS ABSOLUTE: 0.03 10*3/UL
EXTERNAL BILIRUBIN TOTAL (MG/DL) IN SER/PLAS: 0.4 MG/DL
EXTERNAL CALCIUM (MG/DL) IN SER/PLAS: 9.3 MG/DL
EXTERNAL CHLORIDE (MMOL/L) IN SER/PLAS: 102 MMOL/L
EXTERNAL CREATININE (MG/DL) IN SER/PLAS: 0.85 MG/DL
EXTERNAL EOSINOPHILS %: 1.8 %
EXTERNAL EOSINOPHILS ABSOLUTE: 0.12 10*3/UL
EXTERNAL GLOMERULAR FILTRATION RATE ML/MIN/1.73 SQ M.PREDICTED: 65 ML/MIN/1.73M*2
EXTERNAL GLUCOSE (MG/DL) IN SER/PLAS: 103 MG/DL
EXTERNAL HEMATOCRIT: 40.3 %
EXTERNAL HEMOGLOBIN (BASE NAME HGB): 13.3 G/DL
EXTERNAL LEUKOCYTES(10*3/UL) IN BLOOD BY AUTOMATED COUNT: 6.6 10*3/ML
EXTERNAL MCH: 28 PG
EXTERNAL MCHC: 33 G/DL
EXTERNAL MCV: 86 FL
EXTERNAL MONOCYTES ABSOLUTE: 0.41 10*3/UL
EXTERNAL NEUTROPHILS %: 68.4 %
EXTERNAL NEUTROPHILS (10*3/UL) IN BLOOD BY AUTOMATED COUNT: 4.51 10*3/UL
EXTERNAL PLATELET COUNT: 275 10*3/UL
EXTERNAL POTASSIUM (MMOL/L) IN SER/PLAS: 4.2 MMOL/L
EXTERNAL PROTEIN (G/DL) IN SER/PLAS: 6.8 G/DL
EXTERNAL RBC: 4.7 10*6/ΜL
EXTERNAL SODIUM (MMOL/L) IN SER/PLAS: 139 MMOL/L
EXTERNAL UREA NITROGEN (MG/DL) IN SER/PLAS: 17 MG/DL

## 2019-07-24 ENCOUNTER — HOSPITAL ENCOUNTER (OUTPATIENT)
Facility: HOSPITAL | Age: 79
Setting detail: OUTPATIENT SURGERY
Discharge: HOME | End: 2019-07-24
Attending: INTERNAL MEDICINE | Admitting: INTERNAL MEDICINE
Payer: MEDICARE

## 2019-07-24 ENCOUNTER — OFFICE VISIT (OUTPATIENT)
Dept: PREADMISSION TESTING | Facility: HOSPITAL | Age: 79
End: 2019-07-24
Payer: MEDICARE

## 2019-07-24 ENCOUNTER — APPOINTMENT (OUTPATIENT)
Dept: LAB | Facility: HOSPITAL | Age: 79
End: 2019-07-24
Payer: MEDICARE

## 2019-07-24 VITALS
TEMPERATURE: 98.1 F | HEART RATE: 87 BPM | WEIGHT: 170 LBS | DIASTOLIC BLOOD PRESSURE: 78 MMHG | OXYGEN SATURATION: 95 % | SYSTOLIC BLOOD PRESSURE: 156 MMHG | HEIGHT: 62 IN | RESPIRATION RATE: 16 BRPM | BODY MASS INDEX: 31.28 KG/M2

## 2019-07-24 VITALS
HEIGHT: 62 IN | DIASTOLIC BLOOD PRESSURE: 106 MMHG | OXYGEN SATURATION: 97 % | BODY MASS INDEX: 31.28 KG/M2 | SYSTOLIC BLOOD PRESSURE: 141 MMHG | HEART RATE: 121 BPM | WEIGHT: 170 LBS | RESPIRATION RATE: 16 BRPM

## 2019-07-24 DIAGNOSIS — Z01.818 ENCOUNTER FOR PREADMISSION TESTING: Primary | ICD-10-CM

## 2019-07-24 DIAGNOSIS — I70.213 ATHEROSCLEROSIS OF NATIVE ARTERY OF BOTH LOWER EXTREMITIES WITH INTERMITTENT CLAUDICATION (CMS/HCC): Primary | ICD-10-CM

## 2019-07-24 DIAGNOSIS — I70.203 ATHEROSCLEROSIS OF NATIVE ARTERY OF BOTH LOWER EXTREMITIES, WITH UNSPECIFIED PRESENCE OF CLINICAL MANIFESTATION (CMS/HCC): ICD-10-CM

## 2019-07-24 LAB
ANION GAP SERPL CALC-SCNC: 10 MMOL/L (ref 3–11)
ANION GAP SERPL CALC-SCNC: 8 MMOL/L (ref 3–11)
BASOPHILS # BLD AUTO: 0.1 10*3/UL
BASOPHILS NFR BLD AUTO: 1 % (ref 0–2)
BUN SERPL-MCNC: 14 MG/DL (ref 7–25)
BUN SERPL-MCNC: 16 MG/DL (ref 7–25)
CALCIUM SERPL-MCNC: 9.3 MG/DL (ref 8.6–10.3)
CALCIUM SERPL-MCNC: 9.4 MG/DL (ref 8.6–10.3)
CHLORIDE SERPL-SCNC: 102 MMOL/L (ref 98–107)
CHLORIDE SERPL-SCNC: 105 MMOL/L (ref 98–107)
CO2 SERPL-SCNC: 25 MMOL/L (ref 21–32)
CO2 SERPL-SCNC: 27 MMOL/L (ref 21–32)
CREAT SERPL-MCNC: 0.87 MG/DL (ref 0.6–1.2)
CREAT SERPL-MCNC: 0.92 MG/DL (ref 0.6–1.2)
EOSINOPHIL # BLD AUTO: 0.1 10*3/UL
EOSINOPHIL NFR BLD AUTO: 2 % (ref 0–3)
ERYTHROCYTE [DISTWIDTH] IN BLOOD BY AUTOMATED COUNT: 14.6 % (ref 11.5–14)
GFR SERPL CREATININE-BSD FRML MDRD: 59 ML/MIN/1.73M*2
GFR SERPL CREATININE-BSD FRML MDRD: 63 ML/MIN/1.73M*2
GLUCOSE SERPL-MCNC: 104 MG/DL (ref 70–105)
GLUCOSE SERPL-MCNC: 108 MG/DL (ref 70–105)
HCT VFR BLD AUTO: 40.1 % (ref 34–45)
HCT VFR BLD AUTO: 41.8 % (ref 34–45)
HCT VFR BLD AUTO: 42.2 % (ref 34–45)
HGB BLD-MCNC: 13.7 G/DL (ref 11.5–15.5)
HGB BLD-MCNC: 14.2 G/DL (ref 11.5–15.5)
HGB BLD-MCNC: 14.4 G/DL (ref 11.5–15.5)
LYMPHOCYTES # BLD AUTO: 1.8 10*3/UL
LYMPHOCYTES NFR BLD AUTO: 28 % (ref 11–47)
MCH RBC QN AUTO: 28.2 PG (ref 28–33)
MCH RBC QN AUTO: 28.5 PG (ref 28–33)
MCH RBC QN AUTO: 28.8 PG (ref 28–33)
MCHC RBC AUTO-ENTMCNC: 33.7 G/DL (ref 32–36)
MCHC RBC AUTO-ENTMCNC: 34.1 G/DL (ref 32–36)
MCHC RBC AUTO-ENTMCNC: 34.5 G/DL (ref 32–36)
MCV RBC AUTO: 83.3 FL (ref 81–97)
MCV RBC AUTO: 83.5 FL (ref 81–97)
MCV RBC AUTO: 83.6 FL (ref 81–97)
MONOCYTES # BLD AUTO: 0.5 10*3/UL
MONOCYTES NFR BLD AUTO: 8 % (ref 3–11)
NEUTROPHILS # BLD AUTO: 4 10*3/UL
NEUTROPHILS NFR BLD AUTO: 61 % (ref 41–81)
PLATELET # BLD AUTO: 267 10*3/UL (ref 140–350)
PLATELET # BLD AUTO: 273 10*3/UL (ref 140–350)
PLATELET # BLD AUTO: 276 10*3/UL (ref 140–350)
PMV BLD AUTO: 8.1 FL (ref 6.9–10.8)
PMV BLD AUTO: 8.3 FL (ref 6.9–10.8)
PMV BLD AUTO: 8.4 FL (ref 6.9–10.8)
POCT ACTIVATED CLOTTING TIME: 175 SEC (ref 74–137)
POCT ACTIVATED CLOTTING TIME: 186 SEC (ref 74–137)
POCT ACTIVATED CLOTTING TIME: 208 SEC (ref 74–137)
POCT ACTIVATED CLOTTING TIME: 241 SEC (ref 74–137)
POCT ACTIVATED CLOTTING TIME: 257 SEC (ref 74–137)
POTASSIUM SERPL-SCNC: 4.2 MMOL/L (ref 3.5–5.1)
POTASSIUM SERPL-SCNC: 4.2 MMOL/L (ref 3.5–5.1)
RBC # BLD AUTO: 4.8 10*6/ΜL (ref 3.7–5.3)
RBC # BLD AUTO: 5.01 10*6/ΜL (ref 3.7–5.3)
RBC # BLD AUTO: 5.06 10*6/ΜL (ref 3.7–5.3)
SODIUM SERPL-SCNC: 138 MMOL/L (ref 135–145)
SODIUM SERPL-SCNC: 139 MMOL/L (ref 135–145)
WBC # BLD AUTO: 11.3 10*3/UL (ref 4.5–10.5)
WBC # BLD AUTO: 6.5 10*3/UL (ref 4.5–10.5)
WBC # BLD AUTO: 7.3 10*3/UL (ref 4.5–10.5)

## 2019-07-24 PROCEDURE — 99152 MOD SED SAME PHYS/QHP 5/>YRS: CPT | Performed by: INTERNAL MEDICINE

## 2019-07-24 PROCEDURE — (BLANK) HC RECOVERY PHASE-2 EACH ADDITIONAL 1/2 HOUR ACUITY LEVEL 1: Performed by: INTERNAL MEDICINE

## 2019-07-24 PROCEDURE — 6360000200 HC RX 636 W HCPCS (ALT 250 FOR IP)

## 2019-07-24 PROCEDURE — C1887 CATHETER, GUIDING: HCPCS | Performed by: INTERNAL MEDICINE

## 2019-07-24 PROCEDURE — 80048 BASIC METABOLIC PNL TOTAL CA: CPT

## 2019-07-24 PROCEDURE — 99153 MOD SED SAME PHYS/QHP EA: CPT | Performed by: INTERNAL MEDICINE

## 2019-07-24 PROCEDURE — 2550000100 HC RX 255: Performed by: INTERNAL MEDICINE

## 2019-07-24 PROCEDURE — 85025 COMPLETE CBC W/AUTO DIFF WBC: CPT | Performed by: INTERNAL MEDICINE

## 2019-07-24 PROCEDURE — 80048 BASIC METABOLIC PNL TOTAL CA: CPT | Performed by: INTERNAL MEDICINE

## 2019-07-24 PROCEDURE — 37225 PR REVSC OPN/PRQ FEM/POP W/ATHRC/ANGIOP SM VSL: CPT | Mod: LT | Performed by: INTERNAL MEDICINE

## 2019-07-24 PROCEDURE — C1725 CATH, TRANSLUMIN NON-LASER: HCPCS | Performed by: INTERNAL MEDICINE

## 2019-07-24 PROCEDURE — 36415 COLL VENOUS BLD VENIPUNCTURE: CPT

## 2019-07-24 PROCEDURE — 2500000200 HC RX 250 WO HCPCS: Performed by: INTERNAL MEDICINE

## 2019-07-24 PROCEDURE — (BLANK) HC RECOVERY PHASE-2 1ST 1/2 HOUR ACUITY LEVEL 1: Performed by: INTERNAL MEDICINE

## 2019-07-24 PROCEDURE — 6360000200 HC RX 636 W HCPCS (ALT 250 FOR IP): Performed by: INTERNAL MEDICINE

## 2019-07-24 PROCEDURE — C1893 INTRO/SHEATH, FIXED,NON-PEEL: HCPCS | Performed by: INTERNAL MEDICINE

## 2019-07-24 PROCEDURE — 75710 ARTERY X-RAYS ARM/LEG: CPT | Mod: 59 | Performed by: INTERNAL MEDICINE

## 2019-07-24 PROCEDURE — 6370000100 HC RX 637 (ALT 250 FOR IP): Performed by: INTERNAL MEDICINE

## 2019-07-24 PROCEDURE — C1769 GUIDE WIRE: HCPCS | Performed by: INTERNAL MEDICINE

## 2019-07-24 PROCEDURE — 6370000100 HC RX 637 (ALT 250 FOR IP)

## 2019-07-24 PROCEDURE — 37221 HC REVSC OPN/PRQ ILIAC ART W/STNT PLMT & ANGIOPLSTY: CPT | Performed by: INTERNAL MEDICINE

## 2019-07-24 PROCEDURE — 85027 COMPLETE CBC AUTOMATED: CPT | Mod: 59 | Performed by: INTERNAL MEDICINE

## 2019-07-24 PROCEDURE — C2623 CATH, TRANSLUMIN, DRUG-COAT: HCPCS | Performed by: INTERNAL MEDICINE

## 2019-07-24 PROCEDURE — 37225 HC REVSC OPN/PRQ FEM/POP W/ATHRC/ANGIOP SM VSL: CPT | Performed by: INTERNAL MEDICINE

## 2019-07-24 PROCEDURE — C1884 EMBOLIZATION PROTECT SYST: HCPCS | Performed by: INTERNAL MEDICINE

## 2019-07-24 PROCEDURE — 85027 COMPLETE CBC AUTOMATED: CPT | Mod: 59

## 2019-07-24 PROCEDURE — 37221 PR REVSC OPN/PRQ ILIAC ART W/STNT PLMT & ANGIOPLSTY: CPT | Mod: 51,LT | Performed by: INTERNAL MEDICINE

## 2019-07-24 PROCEDURE — C1876 STENT, NON-COA/NON-COV W/DEL: HCPCS | Performed by: INTERNAL MEDICINE

## 2019-07-24 DEVICE — STENT SERB65-09-60-120 PROTEGE GPS V06
Type: IMPLANTABLE DEVICE | Status: FUNCTIONAL
Brand: PROTÉGÉ™ GPS™

## 2019-07-24 RX ORDER — MIDAZOLAM HYDROCHLORIDE 1 MG/ML
INJECTION INTRAMUSCULAR; INTRAVENOUS AS NEEDED
Status: DISCONTINUED | OUTPATIENT
Start: 2019-07-24 | End: 2019-07-24 | Stop reason: HOSPADM

## 2019-07-24 RX ORDER — FENTANYL CITRATE/PF 50 MCG/ML
PLASTIC BAG, INJECTION (ML) INTRAVENOUS AS NEEDED
Status: DISCONTINUED | OUTPATIENT
Start: 2019-07-24 | End: 2019-07-24 | Stop reason: HOSPADM

## 2019-07-24 RX ORDER — ATROPINE SULFATE 0.1 MG/ML
.5-1 INJECTION INTRAVENOUS ONCE AS NEEDED
Status: DISCONTINUED | OUTPATIENT
Start: 2019-07-24 | End: 2019-07-24 | Stop reason: HOSPADM

## 2019-07-24 RX ORDER — ONDANSETRON HYDROCHLORIDE 2 MG/ML
INJECTION, SOLUTION INTRAVENOUS
Status: COMPLETED
Start: 2019-07-24 | End: 2019-07-24

## 2019-07-24 RX ORDER — HYDROCODONE BITARTRATE AND ACETAMINOPHEN 5; 325 MG/1; MG/1
TABLET ORAL
Status: COMPLETED
Start: 2019-07-24 | End: 2019-07-24

## 2019-07-24 RX ORDER — SODIUM CHLORIDE 9 MG/ML
100 INJECTION, SOLUTION INTRAVENOUS CONTINUOUS
Status: DISCONTINUED | OUTPATIENT
Start: 2019-07-24 | End: 2019-07-24 | Stop reason: HOSPADM

## 2019-07-24 RX ORDER — HYDROCODONE BITARTRATE AND ACETAMINOPHEN 5; 325 MG/1; MG/1
1 TABLET ORAL EVERY 4 HOURS PRN
Status: DISCONTINUED | OUTPATIENT
Start: 2019-07-24 | End: 2019-07-24 | Stop reason: HOSPADM

## 2019-07-24 RX ORDER — IOPAMIDOL 755 MG/ML
INJECTION, SOLUTION INTRAVASCULAR AS NEEDED
Status: DISCONTINUED | OUTPATIENT
Start: 2019-07-24 | End: 2019-07-24 | Stop reason: HOSPADM

## 2019-07-24 RX ORDER — NICARDIPINE HCL-0.9% SOD CHLOR 1 MG/10 ML
SYRINGE (ML) INTRAVENOUS AS NEEDED
Status: DISCONTINUED | OUTPATIENT
Start: 2019-07-24 | End: 2019-07-24 | Stop reason: HOSPADM

## 2019-07-24 RX ORDER — LIDOCAINE HYDROCHLORIDE 10 MG/ML
INJECTION, SOLUTION EPIDURAL; INFILTRATION; INTRACAUDAL; PERINEURAL AS NEEDED
Status: DISCONTINUED | OUTPATIENT
Start: 2019-07-24 | End: 2019-07-24 | Stop reason: HOSPADM

## 2019-07-24 RX ORDER — CLOPIDOGREL BISULFATE 75 MG/1
75 TABLET ORAL DAILY
Qty: 30 TABLET | Refills: 3 | Status: SHIPPED | OUTPATIENT
Start: 2019-07-24 | End: 2020-05-15 | Stop reason: ALTCHOICE

## 2019-07-24 RX ORDER — NITROGLYCERIN 0.4 MG/1
0.4 TABLET SUBLINGUAL EVERY 5 MIN PRN
Status: DISCONTINUED | OUTPATIENT
Start: 2019-07-24 | End: 2019-07-24 | Stop reason: HOSPADM

## 2019-07-24 RX ORDER — ONDANSETRON HYDROCHLORIDE 2 MG/ML
4 INJECTION, SOLUTION INTRAVENOUS EVERY 6 HOURS PRN
Status: DISCONTINUED | OUTPATIENT
Start: 2019-07-24 | End: 2019-07-24 | Stop reason: HOSPADM

## 2019-07-24 RX ORDER — CLOPIDOGREL BISULFATE 300 MG/1
TABLET, FILM COATED ORAL AS NEEDED
Status: DISCONTINUED | OUTPATIENT
Start: 2019-07-24 | End: 2019-07-24 | Stop reason: HOSPADM

## 2019-07-24 RX ORDER — HEPARIN SODIUM 1000 [USP'U]/ML
INJECTION, SOLUTION INTRAVENOUS; SUBCUTANEOUS AS NEEDED
Status: DISCONTINUED | OUTPATIENT
Start: 2019-07-24 | End: 2019-07-24 | Stop reason: HOSPADM

## 2019-07-24 RX ADMIN — HYDROCODONE BITARTRATE AND ACETAMINOPHEN 1 TABLET: 5; 325 TABLET ORAL at 16:56

## 2019-07-24 RX ADMIN — ONDANSETRON HYDROCHLORIDE 4 MG: 2 INJECTION, SOLUTION INTRAVENOUS at 16:56

## 2019-07-24 RX ADMIN — ONDANSETRON 4 MG: 2 INJECTION INTRAMUSCULAR; INTRAVENOUS at 16:56

## 2019-07-24 ASSESSMENT — ENCOUNTER SYMPTOMS
NEAR-SYNCOPE: 0
FOCAL WEAKNESS: 0
HEMATOCHEZIA: 0
LIGHT-HEADEDNESS: 0
VOMITING: 0
SYNCOPE: 0
WEAKNESS: 0
CLAUDICATION: 1
COUGH: 0
PALPITATIONS: 0
ORTHOPNEA: 0
BLURRED VISION: 0
NAUSEA: 0
DIZZINESS: 0
HEMATEMESIS: 0
DYSPNEA ON EXERTION: 0
WEIGHT GAIN: 0
PND: 0
IRREGULAR HEARTBEAT: 0
WEIGHT LOSS: 0
SLEEP DISTURBANCES DUE TO BREATHING: 0
HEMATURIA: 0
CHILLS: 0
FEVER: 0
DYSURIA: 0
POOR WOUND HEALING: 0

## 2019-07-24 NOTE — Clinical Note
Prepped: groin. The site was clipped. Prepped with: ChloraPrep. The patient was draped  in the usual sterile fashion.

## 2019-07-24 NOTE — Clinical Note
The lesion was dilated with a single inflation. Max pressure = 10 luba. Total duration = 180 seconds.

## 2019-07-24 NOTE — DISCHARGE INSTRUCTIONS
Today, we performed left common iliac artery stenting and atherectomy with drug-coated balloon angioplasty of the left mid/distal SFA.  We will schedule you for your right lower extremity arterial intervention in 3 to 4 weeks time.  We have started you on Plavix 75 mg once a day.  Please stop aspirin.  Continue with the other medications.

## 2019-07-24 NOTE — H&P
Cardiology History and Physical Note    Patient name: Belkis Burris  MRN: [id number]  Admit date: 7/24/2019    Subjective    Patient ID: Belkis Burris is a 78 y.o. female.    Chief Complaint: Peripheral Artery Disease    The patient presents to the [facility] for a scheduled AIFF.  They have not had an evaluation/ H&P performed within the last 30 days prior and one will be performed today.    Patient is a very pleasant 78-year-old female who is a patient of Dr. Yariel barclay.  Her history significant for carotid artery disease with left internal carotid artery stent with in-stent restenosis, right internal carotid artery occlusion, lower extremity peripheral arterial disease stenosis, questionable left SFA stenosis with a history of CTA, permanent atrial fibrillation, dyslipidemia with intolerance to statins.    Patient underwent a CTA bilateral lower extremity runoff on 5/10/2019 which demonstrated severe stenosis of the left common iliac artery, severe stenosis of the right and left superficial femoral artery.  Patient was then scheduled for an elective AIFF with Dr. Gallardo today.    Patient has been doing well since she was last seen in the clinic by Addy Cox PA-C.  Denies any new or worsening claudication symptoms.  States she could walk approximately 2 blocks without any claudication symptoms.  States she has some bilateral lower extremity cramping which resolves within a couple minutes of rest.  No open ulcerations or wounds.  Denies any rest pain.  Denies any cardiopulmonary complaints.  No chest heaviness, tightness, dyspnea on exertion, PND orthopnea.  No lower extremity swelling.  Denies any TIA or strokelike symptoms.  Denies any heart palpitation or racing heartbeats.  No recent illness.  No fever, chills, nausea, vomiting or diarrhea.  No black or bloody stools.  No hematuria or dysuria.    LAST ECHO EF : 69 %  Last NPO at: 2100  Medications taken  this morning: None  Mallampati class: II (hard and soft palate, upper portion of tonsils anduvula visible) Full ROM of neck Yes  ASA class: ASA 3 - Patient with moderate systemic disease with functional limitations  [id number] SCORE  AGE:  >73 y/o (2)  GENDER Female (1)  HTN  Yes (1)  DM  No (0)  CHF  No (0)  STROKE No (0)  VASC  Yes (1)   RESULT (5 points) Stroke risk was 7.2% per year and 10.0% risk of stroke/TIA/systemic embolism.    Results from last 4 days   Lab Units 19  0647   WBC AUTO 10*3/uL 7.3   RBC AUTO 10*6/µL 5.01   HEMOGLOBIN g/dL 14.4   HEMATOCRIT % 41.8   PLATELETS AUTO 10*3/uL 276   BUN mg/dL 16   CREATININE mg/dL 0.92   POTASSIUM mmol/L 4.2   CHLORIDE mmol/L 105   SODIUM mmol/L 138   CO2 mmol/L 25       Past Medical History:   Diagnosis Date   • A-fib (CMS/[facility]) ([facility])    • A-fib (CMS/[facility]) ([facility])    • Cardiovascular disease    • Complication of anesthesia    • Delayed emergence from general anesthesia    • DVT (deep venous thrombosis) (CMS/HCC) ([facility])    • Dysrhythmia     Afib   • Hypercholesteremia    • Scoliosis    • TIA (transient ischemic attack)    • Wears dentures     upper   • Wears partial dentures     lower     Past Surgical History:   Procedure Laterality Date   • CAROTID STENT     • TUBAL LIGATION       Family History   Problem Relation Age of Onset   • Other Mother         Complications of TB and emphysema, Cause of Death   • Blood Clots Father         Cause of Death   • Other Sister         Carotid endarterectomy   • Other Brother         Carotid endarterectomy     Social History     Tobacco Use   • Smoking status: Former Smoker     Packs/day: 0.50     Years: 10.00     Pack years: 5.00     Types: Cigarettes     Last attempt to quit: 2017     Years since quittin.5   • Smokeless tobacco: Never Used   Substance Use Topics   • Alcohol use: No   • Drug use: No       Allergies   Allergen Reactions   • Amoxicillin Itching   • Metronidazole Hives   • Sulfa (Sulfonamide Antibiotics)  Itching       Medications Prior to Admission   Medication Sig Dispense Refill Last Dose   • INVESTIGATIONAL, CLEAR [id number], Bempedoic acid, ETC-1002, vs placebo tablet Take 1 tablet (180 mg total) by mouth daily 35 tablet 0 2019 at Unknown time   • diltiazem CD (CARDIZEM CD) 180 mg 24 hr capsule Take 1 capsule (180 mg total) by mouth daily. 90 capsule 3 2019 at Unknown time   • Lactobacillus acidophilus (PROBIOTIC) 10 billion cell capsule Take 1 capsule by mouth daily     2019 at Unknown time   • aspirin 81 mg EC tablet Take 1 tablet (81 mg total) by mouth daily. 30 tablet 11 2019 at Unknown time   • apixaban (ELIQUIS) 5 mg tablet Take 5 mg by mouth 2 (two) times a day     2019 at Unknown time   • dexlansoprazole (DEXILANT) 30 mg capsule Take 30 mg by mouth daily     2019 at Unknown time   • [] ciprofloxacin (CIPRO) 500 mg tablet Take 1 tablet (500 mg total) by mouth 2 (two) times a day for 3 days 6 tablet 0    • [] doxycycline (MONODOX) 100 mg capsule Take 1 capsule (100 mg total) by mouth 2 (two) times a day for 3 days 6 capsule 0    • [] ezetimibe (ZETIA) 10 mg tablet Take 1 tablet (10 mg total) by mouth daily. (Patient not taking: Reported on 2018 ) 30 tablet 11 Not Taking       Review of Systems  Review of Systems   Constitution: Negative for chills, fever, malaise/fatigue, weight gain and weight loss.   Eyes: Negative for blurred vision and visual disturbance.   Cardiovascular: Positive for claudication. Negative for chest pain, dyspnea on exertion, irregular heartbeat, leg swelling, near-syncope, orthopnea, palpitations, paroxysmal nocturnal dyspnea and syncope.   Respiratory: Negative for cough and sleep disturbances due to breathing.    Hematologic/Lymphatic: Negative for bleeding problem.   Skin: Negative for poor wound healing.   Gastrointestinal: Negative for hematemesis, hematochezia, melena, nausea and vomiting.   Genitourinary:  Negative for dysuria and hematuria.   Neurological: Negative for dizziness, focal weakness, light-headedness and weakness.       Physical Exam  Temp:  [36.7 °C (98.1 °F)] 36.7 °C (98.1 °F)  Heart Rate:  [87-91] 91  Resp:  [16] 16  BP: (148-156)/(78-97) 148/97  Physical Exam   Constitutional: She is oriented to person, place, and time. She appears well-developed and well-nourished. No distress.   HENT:   Head: Normocephalic and atraumatic.   Neck: Normal range of motion. Neck supple. No JVD present.   Cardiovascular: Normal rate, regular rhythm, normal heart sounds and intact distal pulses.   No murmur heard.  Pulmonary/Chest: Effort normal and breath sounds normal.   Abdominal: Soft. She exhibits no distension.   Genitourinary:   Genitourinary Comments: Deferred   Musculoskeletal: Normal range of motion.   Neurological: She is alert and oriented to person, place, and time.   Skin: Skin is warm and dry.   Psychiatric: She has a normal mood and affect. Her behavior is normal. Judgment and thought content normal.       Assessment and Plan    Principal Problem:    Atherosclerosis of native artery of both lower extremities (CMS/HCC) (HCC)      Plan: Proceed with scheduled AIF F with Dr. Kennedy    CONSENT  The patient states to myself that they have had an in-depth discussion with the medical provider performing their procedure today prior to arrival.  At that time they were given the opportunity to hear the benefits, risks, alternatives and possible complications associated with their procedure that will be performed today. They deny any additional questions for the provider.     This patient seen and examined in conjunction with Dr. Kennedy.   Electronically signed by: Salome Owusu CNP  7/24/2019  9:09 AM

## 2019-07-24 NOTE — Clinical Note
Sheath(s) removed from the right femoral artery. Closure device:  sutured in place.. Closure pressure manually applied. Hemostasis achieved.

## 2019-07-24 NOTE — Clinical Note
ID band present and verified using two patient identifiers. Family is in the lobby. Contact # : [phone]. Bee-daughter

## 2019-07-24 NOTE — Clinical Note
The lesion was dilated with a single inflation. Max pressure = 7 luba. Total duration = 20 seconds. Left iliac

## 2019-07-24 NOTE — Clinical Note
The lesion was dilated with a single inflation. Max pressure = 6 luba. Total duration = 40 seconds.

## 2019-07-25 NOTE — PERIOPERATIVE NURSING NOTE
All teaching completed and understood. No questions or concerns. Patient right groin, clean dry and intact, with bruising noted. No signs of bleeding or hematoma. Patient and family escorted via wheelchair to [facility].

## 2019-08-07 ENCOUNTER — PREP FOR CASE (OUTPATIENT)
Dept: CARDIOLOGY | Facility: HOSPITAL | Age: 79
End: 2019-08-07

## 2019-08-07 ENCOUNTER — TELEPHONE (OUTPATIENT)
Dept: CARDIOLOGY | Facility: CLINIC | Age: 79
End: 2019-08-07

## 2019-08-07 DIAGNOSIS — I70.201 ATHEROSCLEROSIS OF NATIVE ARTERY OF RIGHT LOWER EXTREMITY, WITH UNSPECIFIED PRESENCE OF CLINICAL MANIFESTATION (CMS/HCC): Primary | ICD-10-CM

## 2019-08-07 NOTE — TELEPHONE ENCOUNTER
MD: Dr Gallardo    Case:  Staged AIFF    Date/Time Request:  9/24/19 @ 0730  Anesthesia:  RN    PAT visit: Yes, to be scheduled     TESTS:  CBC, BMP    ALLERGY TO IODINE?   No    Pre Fluids:   No    BUN:  Creatinine:                            GFR:    Weight:      Kg  _______________________________________________    ADDITIONAL INSTRUCTIONS/NEEDS  NPO after midnight the day of your procedure.  You may take your morning medications with a sip of water EXCEPT any vitamins/supplements or any diuretics.    Other medication instructions:  Hold eliquis 24 hours prior to the procedure      You may be discharged later the day of your procedure but be prepared to spend the night if necessary.  If you are discharged the same day, you are required to have a  and someone to spend the night with you.          Comments:  Email sent to scheduling.

## 2019-08-20 ENCOUNTER — TELEPHONE (OUTPATIENT)
Dept: CARDIOLOGY | Facility: CLINIC | Age: 79
End: 2019-08-20

## 2019-08-20 NOTE — TELEPHONE ENCOUNTER
Spoke with pt to review instructions for AIFF on  9/24 with Dr. Gallardo--arrive at the main entrance of [facility] at  0515  for preadmit labs/teaching, your procedure will be the first case of the day     ADDITIONAL INSTRUCTIONS/NEEDS per Radha LEIJA RN:    NPO after midnight the day of your procedure.      You may take your morning medications with a sip of water EXCEPT any vitamins/supplements.  Other medication instructions:  Hold eliquis 24 hours prior to the procedure      You may be discharged later the day of your procedure but be prepared to spend the night if necessary.   If you are discharged the same day, you are required to have a  and someone to spend the night with you.         Pt stated understanding and repeated back instructions..

## 2019-09-24 ENCOUNTER — APPOINTMENT (OUTPATIENT)
Dept: LAB | Facility: HOSPITAL | Age: 79
End: 2019-09-24
Payer: MEDICARE

## 2019-09-24 ENCOUNTER — OFFICE VISIT (OUTPATIENT)
Dept: PREADMISSION TESTING | Facility: HOSPITAL | Age: 79
End: 2019-09-24
Payer: MEDICARE

## 2019-09-24 ENCOUNTER — HOSPITAL ENCOUNTER (OUTPATIENT)
Facility: HOSPITAL | Age: 79
Setting detail: OUTPATIENT SURGERY
Discharge: HOME | End: 2019-09-24
Attending: INTERNAL MEDICINE | Admitting: INTERNAL MEDICINE
Payer: MEDICARE

## 2019-09-24 VITALS — DIASTOLIC BLOOD PRESSURE: 101 MMHG | HEART RATE: 95 BPM | SYSTOLIC BLOOD PRESSURE: 155 MMHG

## 2019-09-24 VITALS
DIASTOLIC BLOOD PRESSURE: 65 MMHG | TEMPERATURE: 96.8 F | HEART RATE: 88 BPM | OXYGEN SATURATION: 95 % | SYSTOLIC BLOOD PRESSURE: 132 MMHG | BODY MASS INDEX: 31.1 KG/M2 | HEIGHT: 62 IN | WEIGHT: 169 LBS | RESPIRATION RATE: 16 BRPM

## 2019-09-24 DIAGNOSIS — I48.92 ATRIAL FIBRILLATION AND FLUTTER (CMS/HCC): ICD-10-CM

## 2019-09-24 DIAGNOSIS — I70.211 ATHEROSCLEROSIS OF NATIVE ARTERY OF RIGHT LOWER EXTREMITY WITH INTERMITTENT CLAUDICATION (CMS/HCC): Primary | ICD-10-CM

## 2019-09-24 DIAGNOSIS — I48.91 ATRIAL FIBRILLATION AND FLUTTER (CMS/HCC): ICD-10-CM

## 2019-09-24 DIAGNOSIS — Z01.818 ENCOUNTER FOR PREADMISSION TESTING: Primary | ICD-10-CM

## 2019-09-24 DIAGNOSIS — I70.201 ATHEROSCLEROSIS OF NATIVE ARTERY OF RIGHT LOWER EXTREMITY, WITH UNSPECIFIED PRESENCE OF CLINICAL MANIFESTATION (CMS/HCC): ICD-10-CM

## 2019-09-24 LAB
ANION GAP SERPL CALC-SCNC: 10 MMOL/L (ref 3–11)
ANISOCYTOSIS BLD QL SMEAR: ABNORMAL
BASOPHILS # BLD AUTO: 0.1 10*3/UL
BASOPHILS NFR BLD AUTO: 1 % (ref 0–2)
BUN SERPL-MCNC: 15 MG/DL (ref 7–25)
CALCIUM SERPL-MCNC: 9.6 MG/DL (ref 8.6–10.3)
CHLORIDE SERPL-SCNC: 104 MMOL/L (ref 98–107)
CO2 SERPL-SCNC: 25 MMOL/L (ref 21–32)
CREAT SERPL-MCNC: 0.85 MG/DL (ref 0.6–1.2)
EOSINOPHIL # BLD AUTO: 0.1 10*3/UL
EOSINOPHIL # BLD MANUAL: 0.2 10*3/UL
EOSINOPHIL NFR BLD AUTO: 1 % (ref 0–3)
EOSINOPHIL NFR BLD MANUAL: 3 % (ref 0–3)
ERYTHROCYTE [DISTWIDTH] IN BLOOD BY AUTOMATED COUNT: 14.8 % (ref 11.5–14)
ERYTHROCYTE [DISTWIDTH] IN BLOOD BY AUTOMATED COUNT: 14.9 % (ref 11.5–14)
GFR SERPL CREATININE-BSD FRML MDRD: 65 ML/MIN/1.73M*2
GLUCOSE SERPL-MCNC: 108 MG/DL (ref 70–105)
HCT VFR BLD AUTO: 40.7 % (ref 34–45)
HCT VFR BLD AUTO: 41.4 % (ref 34–45)
HGB BLD-MCNC: 13.6 G/DL (ref 11.5–15.5)
HGB BLD-MCNC: 13.8 G/DL (ref 11.5–15.5)
LYMPHOCYTES # BLD AUTO: 1.6 10*3/UL
LYMPHOCYTES # BLD MANUAL: 1.6 10*3/UL
LYMPHOCYTES NFR BLD AUTO: 20 % (ref 11–47)
LYMPHOCYTES NFR BLD MANUAL: 22 % (ref 11–47)
MCH RBC QN AUTO: 27.7 PG (ref 28–33)
MCH RBC QN AUTO: 27.8 PG (ref 28–33)
MCHC RBC AUTO-ENTMCNC: 33.2 G/DL (ref 32–36)
MCHC RBC AUTO-ENTMCNC: 33.4 G/DL (ref 32–36)
MCV RBC AUTO: 82.9 FL (ref 81–97)
MCV RBC AUTO: 83.8 FL (ref 81–97)
MONOCYTES # BLD AUTO: 0.6 10*3/UL
MONOCYTES # BLD MANUAL: 0.7 10*3/UL
MONOCYTES NFR BLD AUTO: 8 % (ref 3–11)
MONOCYTES NFR BLD MANUAL: 9 % (ref 3–11)
NEUTROPHILS # BLD AUTO: 5.4 10*3/UL
NEUTROPHILS # BLD MANUAL: 4.82 10*3/UL
NEUTROPHILS NFR BLD AUTO: 70 % (ref 41–81)
NEUTS SEG # BLD MANUAL: 4.8 10*3/UL
NEUTS SEG NFR BLD MANUAL: 66 % (ref 41–81)
PLATELET # BLD AUTO: 260 10*3/UL (ref 140–350)
PLATELET # BLD AUTO: 281 10*3/UL (ref 140–350)
PLATELET # BLD EST: ADEQUATE 10*3/UL
PMV BLD AUTO: 8.2 FL (ref 6.9–10.8)
PMV BLD AUTO: 8.3 FL (ref 6.9–10.8)
POTASSIUM SERPL-SCNC: 4.1 MMOL/L (ref 3.5–5.1)
RBC # BLD AUTO: 4.91 10*6/ΜL (ref 3.7–5.3)
RBC # BLD AUTO: 4.95 10*6/ΜL (ref 3.7–5.3)
SODIUM SERPL-SCNC: 139 MMOL/L (ref 135–145)
TOTAL CELLS COUNTED BLD: 100 CELLS
WBC # BLD AUTO: 7.3 10*3/UL (ref 4.5–10.5)
WBC # BLD AUTO: 7.7 10*3/UL (ref 4.5–10.5)
WBC NRBC COR # BLD: 7.3 10*3/UL

## 2019-09-24 PROCEDURE — (BLANK) HC RECOVERY PHASE-2 1ST 1/2 HOUR ACUITY LEVEL 2: Performed by: INTERNAL MEDICINE

## 2019-09-24 PROCEDURE — 85025 COMPLETE CBC W/AUTO DIFF WBC: CPT | Performed by: INTERNAL MEDICINE

## 2019-09-24 PROCEDURE — 2550000100 HC RX 255: Performed by: INTERNAL MEDICINE

## 2019-09-24 PROCEDURE — C1884 EMBOLIZATION PROTECT SYST: HCPCS | Performed by: INTERNAL MEDICINE

## 2019-09-24 PROCEDURE — 2580000300 HC RX 258: Performed by: INTERNAL MEDICINE

## 2019-09-24 PROCEDURE — 36415 COLL VENOUS BLD VENIPUNCTURE: CPT

## 2019-09-24 PROCEDURE — 99152 MOD SED SAME PHYS/QHP 5/>YRS: CPT | Performed by: INTERNAL MEDICINE

## 2019-09-24 PROCEDURE — C1760 CLOSURE DEV, VASC: HCPCS | Performed by: INTERNAL MEDICINE

## 2019-09-24 PROCEDURE — 99153 MOD SED SAME PHYS/QHP EA: CPT | Performed by: INTERNAL MEDICINE

## 2019-09-24 PROCEDURE — 37225 PR REVSC OPN/PRQ FEM/POP W/ATHRC/ANGIOP SM VSL: CPT | Mod: RT | Performed by: INTERNAL MEDICINE

## 2019-09-24 PROCEDURE — C1893 INTRO/SHEATH, FIXED,NON-PEEL: HCPCS | Performed by: INTERNAL MEDICINE

## 2019-09-24 PROCEDURE — C2623 CATH, TRANSLUMIN, DRUG-COAT: HCPCS | Performed by: INTERNAL MEDICINE

## 2019-09-24 PROCEDURE — 6370000100 HC RX 637 (ALT 250 FOR IP): Performed by: INTERNAL MEDICINE

## 2019-09-24 PROCEDURE — (BLANK) HC RECOVERY PHASE-2 EACH ADDITIONAL 1/2 HOUR ACUITY LEVEL 2: Performed by: INTERNAL MEDICINE

## 2019-09-24 PROCEDURE — 6360000200 HC RX 636 W HCPCS (ALT 250 FOR IP): Performed by: INTERNAL MEDICINE

## 2019-09-24 PROCEDURE — 85027 COMPLETE CBC AUTOMATED: CPT

## 2019-09-24 PROCEDURE — 75710 ARTERY X-RAYS ARM/LEG: CPT | Mod: 59 | Performed by: INTERNAL MEDICINE

## 2019-09-24 PROCEDURE — 37225 HC REVSC OPN/PRQ FEM/POP W/ATHRC/ANGIOP SM VSL: CPT | Mod: RT | Performed by: INTERNAL MEDICINE

## 2019-09-24 PROCEDURE — 85007 BL SMEAR W/DIFF WBC COUNT: CPT | Mod: NCB

## 2019-09-24 PROCEDURE — C1887 CATHETER, GUIDING: HCPCS | Performed by: INTERNAL MEDICINE

## 2019-09-24 PROCEDURE — 80048 BASIC METABOLIC PNL TOTAL CA: CPT

## 2019-09-24 PROCEDURE — C1769 GUIDE WIRE: HCPCS | Performed by: INTERNAL MEDICINE

## 2019-09-24 DEVICE — PERCLOSE PROGLIDE CLOSER DEVICE: Type: IMPLANTABLE DEVICE | Status: FUNCTIONAL

## 2019-09-24 RX ORDER — ATROPINE SULFATE 0.1 MG/ML
.5-1 INJECTION INTRAVENOUS ONCE AS NEEDED
Status: DISCONTINUED | OUTPATIENT
Start: 2019-09-24 | End: 2019-09-24 | Stop reason: HOSPADM

## 2019-09-24 RX ORDER — MIDAZOLAM HYDROCHLORIDE 1 MG/ML
INJECTION INTRAMUSCULAR; INTRAVENOUS AS NEEDED
Status: DISCONTINUED | OUTPATIENT
Start: 2019-09-24 | End: 2019-09-24 | Stop reason: HOSPADM

## 2019-09-24 RX ORDER — CLOPIDOGREL BISULFATE 300 MG/1
TABLET, FILM COATED ORAL AS NEEDED
Status: DISCONTINUED | OUTPATIENT
Start: 2019-09-24 | End: 2019-09-24 | Stop reason: HOSPADM

## 2019-09-24 RX ORDER — SODIUM CHLORIDE 9 MG/ML
100 INJECTION, SOLUTION INTRAVENOUS CONTINUOUS
Status: DISCONTINUED | OUTPATIENT
Start: 2019-09-24 | End: 2019-09-24 | Stop reason: HOSPADM

## 2019-09-24 RX ORDER — FENTANYL CITRATE/PF 50 MCG/ML
PLASTIC BAG, INJECTION (ML) INTRAVENOUS AS NEEDED
Status: DISCONTINUED | OUTPATIENT
Start: 2019-09-24 | End: 2019-09-24 | Stop reason: HOSPADM

## 2019-09-24 RX ORDER — ONDANSETRON HYDROCHLORIDE 2 MG/ML
4 INJECTION, SOLUTION INTRAVENOUS EVERY 6 HOURS PRN
Status: DISCONTINUED | OUTPATIENT
Start: 2019-09-24 | End: 2019-09-24 | Stop reason: HOSPADM

## 2019-09-24 RX ORDER — CALC/MAG/B COMPLEX/D3/HERB 61
15 TABLET ORAL
Status: DISCONTINUED | OUTPATIENT
Start: 2019-09-24 | End: 2019-09-24 | Stop reason: HOSPADM

## 2019-09-24 RX ORDER — IOPAMIDOL 755 MG/ML
INJECTION, SOLUTION INTRAVASCULAR AS NEEDED
Status: DISCONTINUED | OUTPATIENT
Start: 2019-09-24 | End: 2019-09-24 | Stop reason: HOSPADM

## 2019-09-24 RX ORDER — LIDOCAINE HYDROCHLORIDE 10 MG/ML
INJECTION, SOLUTION EPIDURAL; INFILTRATION; INTRACAUDAL; PERINEURAL AS NEEDED
Status: DISCONTINUED | OUTPATIENT
Start: 2019-09-24 | End: 2019-09-24 | Stop reason: HOSPADM

## 2019-09-24 RX ADMIN — SODIUM CHLORIDE 100 ML/HR: 9 INJECTION, SOLUTION INTRAVENOUS at 09:00

## 2019-09-24 NOTE — DISCHARGE INSTR - APPOINTMENTS
[facility] should call you with follow up appointment dates and times for your imaging and your follow up appointment with Addy Cox.  If you do not hear from them within one week, please call [phone].

## 2019-09-24 NOTE — Clinical Note
Sheath(s) removed from the left femoral artery. Closure device used: Perclose. Hemostasis achieved.

## 2019-09-24 NOTE — Clinical Note
The lesion was dilated with a single inflation. Max pressure = 8 luba. Total duration = 180 seconds.

## 2019-10-01 ENCOUNTER — OFFICE VISIT (OUTPATIENT)
Dept: CARDIOLOGY | Facility: CLINIC | Age: 79
End: 2019-10-01
Payer: MEDICARE

## 2019-10-01 ENCOUNTER — CLINICAL SUPPORT (OUTPATIENT)
Dept: RESEARCH | Facility: OTHER | Age: 79
End: 2019-10-01
Payer: MEDICARE

## 2019-10-01 VITALS
HEIGHT: 62 IN | WEIGHT: 171.2 LBS | BODY MASS INDEX: 31.5 KG/M2 | DIASTOLIC BLOOD PRESSURE: 62 MMHG | HEART RATE: 82 BPM | OXYGEN SATURATION: 97 % | RESPIRATION RATE: 18 BRPM | SYSTOLIC BLOOD PRESSURE: 136 MMHG

## 2019-10-01 DIAGNOSIS — Z00.6 RESEARCH SUBJECT: ICD-10-CM

## 2019-10-01 DIAGNOSIS — I48.91 ATRIAL FIBRILLATION AND FLUTTER (CMS/HCC): ICD-10-CM

## 2019-10-01 DIAGNOSIS — I48.92 ATRIAL FIBRILLATION AND FLUTTER (CMS/HCC): ICD-10-CM

## 2019-10-01 PROCEDURE — 93005 ELECTROCARDIOGRAM TRACING: CPT | Mod: PO | Performed by: INTERNAL MEDICINE

## 2019-10-01 PROCEDURE — G0463 HOSPITAL OUTPT CLINIC VISIT: HCPCS | Mod: PO | Performed by: INTERNAL MEDICINE

## 2019-10-01 PROCEDURE — 99213 OFFICE O/P EST LOW 20 MIN: CPT | Mod: 25 | Performed by: INTERNAL MEDICINE

## 2019-10-01 PROCEDURE — 93010 ELECTROCARDIOGRAM REPORT: CPT | Performed by: INTERNAL MEDICINE

## 2019-10-01 PROCEDURE — 3700 RSCH CLEAR T5 MONTH 9 PHONE CALL: Mod: RPD | Performed by: NURSE PRACTITIONER

## 2019-10-01 ASSESSMENT — PAIN SCALES - GENERAL: PAINLEVEL: 0-NO PAIN

## 2019-10-01 NOTE — RESEARCH NOTE
Active Trial: CLEAR    Name: Belkis Burris  : 1940  Age:  78 y.o.  Sex: female    Appt. Location:  CLINICAL RESEARCH   CLINICAL RESEARCH  [address] [place] [id number] [phone]      Trial Information:   Visit Summary:     Belkis completed her T5/M9 phone follow up visit for the Clear Trial. She did undergo a couple or procedures since we last met with her back in July. She underwent an atherectomy and angioplasty of the right mid and distal SFA in September. She also has intervention of the left common iliac artery with balloon angioplasty and stenting. These events were reviewed and have been previously been reported. She denies any additional events. She denies any muscle related events.     Medications were reviewed and reconciled.    IP was reviewed with Belkis. She sounded discouraged with the trial and the results. She explains that she is really doesn't know if the trial or IP is doing anything for her and she wishes to exit the trial. Options and education regarding IP were discussed with her. She still noted that she might want to exit the trial. It was decided that she would take a temporary IP interruption of IP until we met with her in the clinic in January. I have a feeling that she may be overwhelmed with her recent procedures. Her last dose to best of recollection was on 19.    She was thanked for her time and was encouraged to call with any questions or concerns. We will see her back in the clinic in 3 months.       Study Drug Compliance:   Patient is compliant with Study drug? No      Events:    Adverse Events: See Log    Serious Adverse Events: None Reported    Outcome Events: None Reported

## 2019-10-01 NOTE — PROGRESS NOTES
Cardiac Electrophysiology Outpatient Consultation      Patient Demographic:  Belkis Burris  : 1940   Gender: female      Chief Complaint   Patient presents with   • atrial fibrillation and flutter       History of Present Illness:    Pleasant 78-year-old female with history of vascular disease who underwent recent right common femoral artery thrombectomy with prior history of left internal carotid artery stent and prior stroke who is maintained on Eliquis with permanent atrial fibrillation for a number of years.  She has no complaints of symptoms on Eliquis and diltiazem  mg daily.  She denied palpitations or significant dyspnea symptoms and has only mild fatigue.  Wore a Holter monitor about a year ago revealing an average heart rate in atrial fibrillation at 96 bpm.    Specialty Comments:  Last updated by Patricia Colin LPN on 3/25/18    Follow up (PAD) last office visit with vascular on 17. Saw Mildred in EP on 3/23/18. Needs carotid duplex and art duplex with ABIs (per your office note on 17.)      ARRHYTHMIA: A Fib / Flutter    PVD:  1) Stroke of internal carotid artery  2) Acute occlusion R common femoral artery [Mechanical aspiration/thrombectomy] - 2017  3)LICA stent on 17, known occluded VIRI - subsequent stroke.      Problem List        Circulatory    Atrial fibrillation and flutter (CMS/HCC) ([facility])    Relevant Orders    ECG 12 lead -Normal, Today (Completed)            Past Medical, Surgical, Family and Social History:    Past Medical History:   Diagnosis Date   • A-fib (CMS/HCC) ([facility])    • A-fib (CMS/HCC) ([facility])    • Cardiovascular disease    • Complication of anesthesia    • Delayed emergence from general anesthesia    • DVT (deep venous thrombosis) (CMS/HCC) ([facility])    • Dysrhythmia     Afib   • Hypercholesteremia    • Scoliosis    • TIA (transient ischemic attack)    • Wears dentures     upper   • Wears partial dentures     lower     Past Surgical  History:   Procedure Laterality Date   • AIFF ANGIO Bilateral 2019    Procedure: Aiff Angio;  Surgeon: Kailash Gallardo MD;  Location: [facility];  Service: Peripheral Vascular;  Laterality: Bilateral;  Late case #1 at 0830   • AIFF ANGIO Right 2019    Procedure: Aiff Angio staged;  Surgeon: Kailash Gallardo MD;  Location: [facility];  Service: Peripheral Vascular;  Laterality: Right;   • ATHERECTOMY - PERIPHERAL N/A 2019    Procedure: Atherectomy - peripheral;  Surgeon: Kailash Gallardo MD;  Location: [facility];  Service: Peripheral Vascular;  Laterality: N/A;   • ATHERECTOMY - PERIPHERAL N/A 2019    Procedure: Atherectomy - peripheral;  Surgeon: Kailash Gallardo MD;  Location: [facility];  Service: Peripheral Vascular;  Laterality: N/A;   • CAROTID STENT     • PERIPHERAL ARTERIAL STENT GRAFT N/A 2019    Procedure: Peripheral artery stenting;  Surgeon: Kailash Gallardo MD;  Location: [facility];  Service: Peripheral Vascular;  Laterality: N/A;   • TUBAL LIGATION       Family History   Problem Relation Age of Onset   • Other Mother         Complications of TB and emphysema, Cause of Death   • Blood Clots Father         Cause of Death   • Other Sister         Carotid endarterectomy   • Other Brother         Carotid endarterectomy     Social History     Tobacco Use   • Smoking status: Former Smoker     Packs/day: 0.50     Years: 10.00     Pack years: 5.00     Types: Cigarettes     Last attempt to quit: 2017     Years since quittin.7   • Smokeless tobacco: Never Used   Substance Use Topics   • Alcohol use: No   • Drug use: No       Allergies as of 10/01/2019 - Reviewed 10/01/2019   Allergen Reaction Noted   • Amoxicillin Itching 2017   • Metronidazole Hives 2017   • Sulfa (sulfonamide antibiotics) Itching 2017       Current Outpatient Medications   Medication Sig Dispense Refill   • vit A/vit C/vit E/zinc/copper (PRESERVISION AREDS ORAL) Take  "by mouth     • clopidogrel (PLAVIX) 75 mg tablet Take 1 tablet (75 mg total) by mouth daily 30 tablet 3   • diltiazem CD (CARDIZEM CD) 180 mg 24 hr capsule Take 1 capsule (180 mg total) by mouth daily. 90 capsule 3   • Lactobacillus acidophilus (PROBIOTIC) 10 billion cell capsule Take 1 capsule by mouth daily       • apixaban (ELIQUIS) 5 mg tablet Take 5 mg by mouth 2 (two) times a day       • dexlansoprazole (DEXILANT) 30 mg capsule Take 30 mg by mouth daily         No current facility-administered medications for this visit.          Review of Systems:  Constitution: Negative for decreased appetite, diaphoresis, fever and mild malaise/fatigue.   HENT: Negative for ear discharge, hoarse voice and sore throat.    Eyes: Negative for discharge, photophobia and visual changes.   Cardiovascular: Negative for chest pain, leg swelling/pain and orthopnea.   Respiratory: Negative for cough, shortness of breath and difficulty breathing.    Endocrine: Negative for cold intolerance and heat intolerance.   Hematologic/Lymphatic: Negative for adenopathy and major bleeding.   Skin: Negative for flushing, rash and open sores.   Musculoskeletal: Negative for arthritis, falls, joint pain and myalgias/muscle aches.   Gastrointestinal: Negative for bloating, constipation, diarrhea and jaundice.   Neurological: Negative for dizziness, focal weakness, headaches and seizures.   Psychiatric/Behavioral: Negative for depression. The patient is not nervous/anxious.     Physical Examination:  Vitals:    10/01/19 1337   BP: 136/62   Pulse: 82   Resp: 18   SpO2: 97%     Vitals:    10/01/19 1337   BP: 136/62   Pulse: 82   Resp: 18   SpO2: 97%   Height: 1.562 m (5' 1.5\")   Weight: 77.7 kg (171 lb 3.2 oz)   PainSc: 0-No pain     Constitutional: Oriented to person, place, and time. Appears well-developed and well-nourished.   Head: Normocephalic and atraumatic.   Eyes: Pupils are equal, round, and reactive to light. EOM are normal.   Neck: Normal " range of motion. Neck supple.   Cardiovascular:  irregular rhythm. Exam reveals no gallop and no friction rub.   No murmur heard.  Pulmonary/Chest: Effort normal and breath sounds normal. No respiratory distress. No wheezes. No rales. No tenderness.   Abdominal: Soft. Bowel sounds are normal. No distension and no mass. There is no tenderness. There is no rebound and no guarding.   Musculoskeletal: Normal range of motion.      General: No edema.   Neurological: Alert and oriented to person, place, and time. No cranial nerve deficit.   Skin: Skin is warm and dry. No rash noted.   Psychiatric: Normal mood and affect. Her behavior is normal.       Twelve-lead EKG reveals atrial fibrillation with narrow intrinsic QRS complex      Assessment:    Asymptomatic permanent atrial fibrillation is a 78-year-old female with an elevated stroke risk score.        Recommendations:     I recommended no change in medical therapy with continuation of Eliquis and diltiazem.  No need for further electrophysiologic follow-up at this point.        Thank you for allowing me to participate in the care of this patient.  If you have any questions, comments or concerns please contact me @ [phone]  Electronically signed by: Pa Ward MD  10/1/2019  1:58 PM

## 2019-10-01 NOTE — LETTER
[facility]  [address] [facility] [place] [address]  [phone]  Dept: [phone]  2019     WILDER Masterson  [address] [facility] [place] [address]    Patient: Belksi Burris   YOB: 1940   Date of Visit: 10/1/2019       To:  WILDER Masterson    Our mutual patient, Belkis Burris, was seen in my office on 10/1/2019. Below are my notes.     Pa Ward MD  10/1/2019  2:00 PM  Sign when Signing Visit                     Cardiac Electrophysiology Outpatient Consultation      Patient Demographic:  Belkis Burris  : 1940   Gender: female      Chief Complaint   Patient presents with   • atrial fibrillation and flutter       History of Present Illness:    Pleasant 78-year-old female with history of vascular disease who underwent recent right common femoral artery thrombectomy with prior history of left internal carotid artery stent and prior stroke who is maintained on Eliquis with permanent atrial fibrillation for a number of years.  She has no complaints of symptoms on Eliquis and diltiazem  mg daily.  She denied palpitations or significant dyspnea symptoms and has only mild fatigue.  Wore a Holter monitor about a year ago revealing an average heart rate in atrial fibrillation at 96 bpm.    Specialty Comments:  Last updated by Patricia Colin LPN on 3/25/18    Follow up (PAD) last office visit with vascular on 17. Saw Mildred in EP on 3/23/18. Needs carotid duplex and art duplex with ABIs (per your office note on 17.)      ARRHYTHMIA: A Fib / Flutter    PVD:  1) Stroke of internal carotid artery  2) Acute occlusion R common femoral artery [Mechanical aspiration/thrombectomy] - 2017  3)LICA stent on 17, known occluded VIRI - subsequent stroke.      Problem List        Circulatory    Atrial fibrillation and flutter (CMS/HCC) (HCC)    Relevant Orders    ECG 12 lead -Normal, Today (Completed)            Past Medical,  Surgical, Family and Social History:    Past Medical History:   Diagnosis Date   • A-fib (CMS/HCC) ([facility])    • A-fib (CMS/HCC) ([facility])    • Cardiovascular disease    • Complication of anesthesia    • Delayed emergence from general anesthesia    • DVT (deep venous thrombosis) (CMS/HCC) ([facility])    • Dysrhythmia     Afib   • Hypercholesteremia    • Scoliosis    • TIA (transient ischemic attack)    • Wears dentures     upper   • Wears partial dentures     lower     Past Surgical History:   Procedure Laterality Date   • AIFF ANGIO Bilateral 7/24/2019    Procedure: Aiff Angio;  Surgeon: Kailash Gallardo MD;  Location: [facility];  Service: Peripheral Vascular;  Laterality: Bilateral;  Late case #1 at 0830   • AIFF ANGIO Right 9/24/2019    Procedure: Aiff Angio staged;  Surgeon: Kailash Gallardo MD;  Location: [facility];  Service: Peripheral Vascular;  Laterality: Right;   • ATHERECTOMY - PERIPHERAL N/A 7/24/2019    Procedure: Atherectomy - peripheral;  Surgeon: Kailash Gallardo MD;  Location: [facility];  Service: Peripheral Vascular;  Laterality: N/A;   • ATHERECTOMY - PERIPHERAL N/A 9/24/2019    Procedure: Atherectomy - peripheral;  Surgeon: Kailash Gallardo MD;  Location: [facility];  Service: Peripheral Vascular;  Laterality: N/A;   • CAROTID STENT     • PERIPHERAL ARTERIAL STENT GRAFT N/A 7/24/2019    Procedure: Peripheral artery stenting;  Surgeon: Kailash Gallardo MD;  Location: [facility];  Service: Peripheral Vascular;  Laterality: N/A;   • TUBAL LIGATION       Family History   Problem Relation Age of Onset   • Other Mother         Complications of TB and emphysema, Cause of Death   • Blood Clots Father         Cause of Death   • Other Sister         Carotid endarterectomy   • Other Brother         Carotid endarterectomy     Social History     Tobacco Use   • Smoking status: Former Smoker     Packs/day: 0.50     Years: 10.00     Pack years: 5.00     Types: Cigarettes     Last attempt to  quit: 2017     Years since quittin.7   • Smokeless tobacco: Never Used   Substance Use Topics   • Alcohol use: No   • Drug use: No       Allergies as of 10/01/2019 - Reviewed 10/01/2019   Allergen Reaction Noted   • Amoxicillin Itching 2017   • Metronidazole Hives 2017   • Sulfa (sulfonamide antibiotics) Itching 2017       Current Outpatient Medications   Medication Sig Dispense Refill   • vit A/vit C/vit E/zinc/copper (PRESERVISION AREDS ORAL) Take by mouth     • clopidogrel (PLAVIX) 75 mg tablet Take 1 tablet (75 mg total) by mouth daily 30 tablet 3   • diltiazem CD (CARDIZEM CD) 180 mg 24 hr capsule Take 1 capsule (180 mg total) by mouth daily. 90 capsule 3   • Lactobacillus acidophilus (PROBIOTIC) 10 billion cell capsule Take 1 capsule by mouth daily       • apixaban (ELIQUIS) 5 mg tablet Take 5 mg by mouth 2 (two) times a day       • dexlansoprazole (DEXILANT) 30 mg capsule Take 30 mg by mouth daily         No current facility-administered medications for this visit.          Review of Systems:  Constitution: Negative for decreased appetite, diaphoresis, fever and mild malaise/fatigue.   HENT: Negative for ear discharge, hoarse voice and sore throat.    Eyes: Negative for discharge, photophobia and visual changes.   Cardiovascular: Negative for chest pain, leg swelling/pain and orthopnea.   Respiratory: Negative for cough, shortness of breath and difficulty breathing.    Endocrine: Negative for cold intolerance and heat intolerance.   Hematologic/Lymphatic: Negative for adenopathy and major bleeding.   Skin: Negative for flushing, rash and open sores.   Musculoskeletal: Negative for arthritis, falls, joint pain and myalgias/muscle aches.   Gastrointestinal: Negative for bloating, constipation, diarrhea and jaundice.   Neurological: Negative for dizziness, focal weakness, headaches and seizures.   Psychiatric/Behavioral: Negative for depression. The patient is not nervous/anxious.  "    Physical Examination:  Vitals:    10/01/19 1337   BP: 136/62   Pulse: 82   Resp: 18   SpO2: 97%     Vitals:    10/01/19 1337   BP: 136/62   Pulse: 82   Resp: 18   SpO2: 97%   Height: 1.562 m (5' 1.5\")   Weight: 77.7 kg (171 lb 3.2 oz)   PainSc: 0-No pain     Constitutional: Oriented to person, place, and time. Appears well-developed and well-nourished.   Head: Normocephalic and atraumatic.   Eyes: Pupils are equal, round, and reactive to light. EOM are normal.   Neck: Normal range of motion. Neck supple.   Cardiovascular:  irregular rhythm. Exam reveals no gallop and no friction rub.   No murmur heard.  Pulmonary/Chest: Effort normal and breath sounds normal. No respiratory distress. No wheezes. No rales. No tenderness.   Abdominal: Soft. Bowel sounds are normal. No distension and no mass. There is no tenderness. There is no rebound and no guarding.   Musculoskeletal: Normal range of motion.      General: No edema.   Neurological: Alert and oriented to person, place, and time. No cranial nerve deficit.   Skin: Skin is warm and dry. No rash noted.   Psychiatric: Normal mood and affect. Her behavior is normal.       Twelve-lead EKG reveals atrial fibrillation with narrow intrinsic QRS complex      Assessment:    Asymptomatic permanent atrial fibrillation is a 78-year-old female with an elevated stroke risk score.        Recommendations:     I recommended no change in medical therapy with continuation of Eliquis and diltiazem.  No need for further electrophysiologic follow-up at this point.        Thank you for allowing me to participate in the care of this patient.  If you have any questions, comments or concerns please contact me @ [phone]  Electronically signed by: Pa Ward MD  10/1/2019  1:58 PM                 If you have questions, please do not hesitate to call me. I look forward to following your patient along with you.         Sincerely,        Pa Ward MD        CC: Ash Cohn, " MD

## 2019-10-18 ENCOUNTER — TELEPHONE (OUTPATIENT)
Dept: CARDIOLOGY | Facility: CLINIC | Age: 79
End: 2019-10-18

## 2019-10-18 NOTE — H&P
[facility] [place]  [address] [place] [id number]    Cardiovascular History and Physical Note    Patient name: Belkis Burris  MRN: [id number]  Admit date: 10/18/2019    Subjective    Patient ID: Belkis Burris is a 78 y.o. female.    Chief Complaint: No chief complaint on file.    HPI     78-year-old woman, very well-known to me has a background significant for left carotid artery stenting, right internal carotid artery occlusion, atrial fibrillation, intolerance to statins and dyslipidemia was recently seen by Addy Cox in our clinic with bilateral lower extremity claudication.  The patient has not responded to exercise and pharmacotherapy.  Her CTA showed evidence of significant left common iliac artery stenosis with bilateral SFA disease.  The patient underwent left common iliac and left distal SFA intervention on 7/24/2019.     The patient continues to have right lower external claudication, which is not improved with exercise and pharmacotherapy.      Specialty Problems        Cardiology Problems    Atrial fibrillation and flutter (CMS/HCC) (HCC)        Atrial fibrillation with RVR (CMS/HCC) ([facility])              Past Medical History:   Diagnosis Date   • A-fib (CMS/HCC) (HCC)    • A-fib (CMS/HCC) ([facility])    • Cardiovascular disease    • Complication of anesthesia    • Delayed emergence from general anesthesia    • DVT (deep venous thrombosis) (CMS/HCC) (HCC)    • Dysrhythmia     Afib   • Hypercholesteremia    • Scoliosis    • TIA (transient ischemic attack)    • Wears dentures     upper   • Wears partial dentures     lower       Past Surgical History:   Procedure Laterality Date   • AIFF ANGIO Bilateral 7/24/2019    Procedure: Aiff Angio;  Surgeon: Kailash Gallardo MD;  Location: [facility];  Service: Peripheral Vascular;  Laterality: Bilateral;  Late case #1 at 0830   • AIFF ANGIO Right 9/24/2019    Procedure: Aiff Angio staged;  Surgeon: Kailash Gallardo MD;   Location: [facility];  Service: Peripheral Vascular;  Laterality: Right;   • ATHERECTOMY - PERIPHERAL N/A 2019    Procedure: Atherectomy - peripheral;  Surgeon: Kailash Gallardo MD;  Location: [facility];  Service: Peripheral Vascular;  Laterality: N/A;   • ATHERECTOMY - PERIPHERAL N/A 2019    Procedure: Atherectomy - peripheral;  Surgeon: Kailash Gallardo MD;  Location: [facility];  Service: Peripheral Vascular;  Laterality: N/A;   • CAROTID STENT     • PERIPHERAL ARTERIAL STENT GRAFT N/A 2019    Procedure: Peripheral artery stenting;  Surgeon: Kailash Gallardo MD;  Location: [facility] Cath Lab;  Service: Peripheral Vascular;  Laterality: N/A;   • TUBAL LIGATION         Allergies as of 2019 - Reviewed 2019   Allergen Reaction Noted   • Amoxicillin Itching 2017   • Metronidazole Hives 2017   • Sulfa (sulfonamide antibiotics) Itching 2017       No medications prior to admission.       Family History   Problem Relation Age of Onset   • Other Mother         Complications of TB and emphysema, Cause of Death   • Blood Clots Father         Cause of Death   • Other Sister         Carotid endarterectomy   • Other Brother         Carotid endarterectomy       Social History     Tobacco Use   • Smoking status: Former Smoker     Packs/day: 0.50     Years: 10.00     Pack years: 5.00     Types: Cigarettes     Last attempt to quit: 2017     Years since quittin.7   • Smokeless tobacco: Never Used   Substance Use Topics   • Alcohol use: No   • Drug use: No       Review of Systems:  Constitution: Negative for decreased appetite, fever and weight loss. No fatigue  HENT: Negative for ear discharge and nose bleeds.    Eyes: Negative for blurred vision, pain and visual disturbance.   Cardiovascular: Negative for chest pain, dyspnea on exertion, irregular heartbeat, leg swelling, orthopnea, palpitations and syncope.   Respiratory: Negative for cough, shortness of breath, sputum  production and wheezing.    Endocrine: Negative for cold intolerance, heat intolerance, polydipsia and polyphagia.   Hematologic/Lymphatic: Negative for bleeding problem.   Musculoskeletal: Negative for joint swelling.   Gastrointestinal: Negative for abdominal pain, anorexia, melena and nausea.   Genitourinary: Negative for bladder incontinence, flank pain and pelvic pain.   Neurological: Negative for disturbances in coordination, focal weakness and vertigo.   Psychiatric/Behavioral: Negative for altered mental status and memory loss.     Objective     BP (!) 155/101   Pulse 95   Weight:      Physical Examination:  Constitutional: Oriented to person, place, and time. Well-nourished. No distress.   HENT:   Head: Normocephalic and atraumatic.   Mouth/Throat: No oropharyngeal exudate.   Eyes: Conjunctivae and EOM are normal. Pupils are equal, round, and reactive to light. Right eye exhibits no discharge. Left eye exhibits no discharge. No scleral icterus.   Neck: Normal range of motion. Neck supple. No thyromegaly present.   Cardiovascular: Normal rate, regular rhythm, S1 normal and S2 normal.  Exam reveals decreased pulses. Exam reveals no gallop and no friction rub. Murmur heard. Grade 2/6 ESM   Pulmonary/Chest: Effort normal and breath sounds normal. No respiratory distress. No wheezes.   Abdominal: No distension and no mass. There is no tenderness. There is no rebound and no guarding.   Musculoskeletal: No tenderness or deformity. No edema.   Lymphadenopathy: No cervical adenopathy.   Neurological: Alert and oriented to person, place, and time. No cranial nerve deficit. Normal muscle tone. Coordination is normal.   Skin: Skin is warm. No rash noted. Not diaphoretic.   Psychiatric: Normal mood and affect. Normal behavior. Judgment and thought content is  normal.     Data Review:     Peripheral    Result Date: 9/24/2019  Narrative: PROCEDURES PERFORMED: 1.  Ultrasound-guided left common femoral arterial access. 2.   Pelvic angiogram. 3.  Selective right lower extremity angiogram. 4.  Selective right infrapopliteal angiogram. 5.  Successful intervention of the right mid and distal SFA with Hawk one M directional atherectomy with distal embolization protection device [5 mm spider filter] followed with drug-coated balloon angioplasty. 6.  Left iliofemoral angiogram. INDICATIONS/BRIEF CLINICAL HISTORY/INVESTIGATIONS: 78-year-old woman, very well-known to me has a background significant for left carotid artery stenting, right internal carotid artery occlusion, atrial fibrillation, intolerance to statins and dyslipidemia was recently seen by Addy Cox in our clinic with bilateral lower extremity claudication.  The patient has not responded to exercise and pharmacotherapy.  Her CTA showed evidence of significant left common iliac artery stenosis with bilateral SFA disease.  The patient underwent left common iliac and left distal SFA intervention on 7/24/2019.  The patient now returns for her right lower extremity intervention given her ongoing symptoms of claudication [involving the right lower extremity]. ANESTHESIA: 51 minutes of moderate sedation was administered using Fentanyl and Versed under my supervision. The patient´s ASA class was 3. I monitored the patient for further administration of agents as needed including continuous monitoring of oxygen saturation, heart rate and blood pressure. The RN administered the medicine under my direct supervision order, and an independent trained observer was present Local anesthesia ACCESS: Left common femoral artery (6Fr. Sheath) PROCEDURE IN DETAIL: The rationale, risks, benefits and alternatives of lower extremity angiograms/percutaneous endovascular intervention was explained to the patient and family in great detail. The risk of and possible complications, such as infection, bleeding requiring blood transfusion, damage to the blood vessel requiring repair vascular surgery,  allergic reactions to the x-ray dye, acute kidney injury needing hemodialysis, significant cardiac arrhythmias, heart failure, stroke and death was explained in detail to the patient and family. The patient wanted all endovascular options to be explored before considering surgery. All questions from the patient and family were explained to their fullest satisfaction. After obtaining the informed consent, the patient was brought to the cardiac cath lab, draped and prepped in a sterile manner. Following moderate sedation, the left groin was infiltrated with 10-15cc of 1% Lidocaine. The left common femoral artery was cannulated using the modified Seldinger technique with the micropuncture needle under ultrasound and fluoroscopic guidance. A 5 Fr. short sheath was placed. The [0.035 Wholey] wire was positioned in the abdominal aorta. A 5Fr. Omniflush catheter was advanced into the distal abdominal aorta over the [0.035 Wholey] wire. [A distal abdominal aorta and pelvic angiogram was obtained.] The [0.035 Wholey] wire was then advanced into the distal right [common femoral artery]. The 5Fr. Omniflush catheter was then advanced over the [Wholey] wire and positioned in the proximal right common femoral artery. A right lower extremity angiogram was performed. The Wholey wire was then advanced and positioned in the mid right popliteal artery.  A 5 Setswana Omni Flush catheter was then exchanged for a angled aqua tempo catheter, which was then positioned in the mid right popliteal artery.  The Wholey wire was removed.  A selective right infrapopliteal angiogram was obtained. The decision was made to intervene on the right SFA disease. A 260 cm, 7cm soft tip, 0.035 Super Stiff Amplatz wire was positioned in the right distal popliteal artery. The angled aqua tempo catheter and the 5 Fr. sheath was removed and exchanged for a 6 Fr. X 45 cm [[employer]] sheath, which was positioned in the proximal right [common  femoral artery]. The patient was given intravenous [bilvalirudin] for therapeutic anticoagulation. The Super Stiff Amplatz wire position in the distal right popliteal artery, the angled aqua tempo catheter was advanced over the Amplatz wire and positioned in the distal right popliteal artery.  The Amplatz wire was removed. A [5] mm ev3 Spider Filter was deployed in the right [distal] popliteal artery. Directional atherectomy of the right mid and distal SFA in two quadrants was performed using the [Hawk One] M [employer] catheter. Drug coated balloon angioplasty of the right mid and distal SFA using 6 x 60 mm and 5 x 120 mm Admiral Medtronic balloons were used. Post balloon angioplasty angiograms revealed a residual stenosis of less than 30%. There was no evidence of dissection, perforation or intramural hematoma. No filling defects in the spider filter. Spider filter was retrieved using the angled aqua tempo catheter. Final right infrapopliteal angiograms showed preserved flow to the right foot. [At the end of the procedure, the long sheath was retrieved into the ipsilateral left iliofemoral arterial tree.  Left iliofemoral angiogram was obtained.  The left common femoral arteriotomy site was appropriate for vascular closure device.] [The long sheath was exchanged for a Perclose over the Super Stiff Amplatz wire and was successfully deployed achieving patent hemostasis.] The patient was asymptomatic with stable vital signs on leaving the cath lab. VASCULAR CLOSURE DEVICE: Perclose MEDICATIONS USED IN THE CATH LAB: Bivalirudin bolus followed with an infusion. Plavix 300mg orally one dose. FINDINGS: A. HEMODYNAMICS: Central Aortic Pressure: 130/70 mmHg. B. ANGIOGRAPHY: 1) PELVIC ANGIOGRAM    INFLOW ARTERIES/ILIAC VESSELS     Left common iliac artery has patent stent.     Left external iliac artery has [no significant obstructive disease].     Left internal iliac artery has mild to moderate segmental calcified  disease.     Right common iliac artery has ulcerated 40% stenosis.     Right external iliac artery has [no significant obstructive disease].     Right internal iliac artery has mild to moderate segmental disease.     2) LEFT LOWER EXTREMITY ANGIOGRAM     OUTFLOW ARTERIES/FEMORO-POPLITEAL VESSELS     Left common femoral artery has [no significant obstructive disease].  The arteriotomy site was appropriate for vascular closure device.     Left superficial femoral artery has [no significant obstructive disease] in its ostial and proximal segments.     Left profunda femoris has [no significant obstructive disease] in its ostial and proximal segments. 3) RIGHT LOWER EXTREMITY ANGIOGRAM     OUTFLOW ARTERIES/FEMORO-POPLITEAL VESSELS     Right common femoral artery has [no significant obstructive disease]     Right superficial femoral artery has 90% stenosis in its mid segment.  The distal SFA had diffuse 60% stenosis.     Right profunda femoris has [no significant obstructive disease]     Right popliteal artery has [no significant obstructive disease]     INFRAPOPLITEAL ARTERIES     Three-vessel run-off to the foot, which is uninterrupted.     Right anterior tibial artery has [no significant obstructive disease]     Right tibioperoneal trunk has [no significant obstructive disease]     Right posterior tibial artery has [no significant obstructive disease]     Right peroneal artery has moderate diffuse disease     Right dorsalis pedis artery has [no significant obstructive disease]     Right distal posterior tibial and plantar artery has [no significant obstructive disease] COMPLICATIONS: None. BLOOD LOSS: 10 mL. SPECIMEN REMOVED: Atherosclerotic plaque. CONDITION: Stable.     Impression: 1.  Residual right lower extremity claudication with severe SFA disease. 2.  Successful intervention of the right SFA with directional atherectomy and drug-coated balloon angioplasty with good angiographic results. PLAN: 1. Patient can  recover in CPU.  2. Bed rest for 4 hours. 3. Please check a Hemoglobin and Hematocrit at 2 PM. 4. Continue with Plavix and Eliquis.  5. Aggressive secondary prevention of atherosclerosis. 6. Patient can be discharged today if stable. DISPO: CPU and home.      Assessment and Plan:    1.  Peripheral arterial disease with residual right lower extremity claudication.  We will recommend lower extremity angiograms.  Continue with current medical management.    2.  Carotid artery disease  Underwent previous left carotid artery stenting.  Stable.  Continue current medical management.    3.  Dyslipidemia  Continue with current statin therapy.    3.  Atrial fibrillation  Continue with oral anticoagulation and Diltiazem    Electronically signed by: MEGHNA ESPINOSA MD  10/18/2019  2:29 PM

## 2019-10-18 NOTE — TELEPHONE ENCOUNTER
Belkis called with a few questions regarding her upcoming dental appointment. She is wondering if she should stop her clopidogrel and apixaban. I told her that usually we do not suggest patients stop these medications, unless sometimes insisted by the dentist doing the extraction. She verbalized understanding and said they did not wish to stop her medications. She is also wondering if novocain is okay for them to use, I told her it was, and also checked with Radha HOOVER RN who confirmed this. She is also wondering if she needs to be on antibiotics prior to this. I told her the only time we recommend this is if the patient has an artificial valve, which she does not. She verbalized understanding and had no further questions.

## 2019-11-01 ENCOUNTER — OFFICE VISIT (OUTPATIENT)
Dept: CARDIOLOGY | Facility: CLINIC | Age: 79
End: 2019-11-01
Payer: MEDICARE

## 2019-11-01 ENCOUNTER — ANCILLARY PROCEDURE (OUTPATIENT)
Dept: CARDIOLOGY | Facility: CLINIC | Age: 79
End: 2019-11-01
Payer: MEDICARE

## 2019-11-01 VITALS
HEART RATE: 77 BPM | HEIGHT: 62 IN | DIASTOLIC BLOOD PRESSURE: 84 MMHG | OXYGEN SATURATION: 98 % | WEIGHT: 165 LBS | BODY MASS INDEX: 30.36 KG/M2 | SYSTOLIC BLOOD PRESSURE: 121 MMHG

## 2019-11-01 DIAGNOSIS — I70.211 ATHEROSCLEROSIS OF NATIVE ARTERY OF RIGHT LOWER EXTREMITY WITH INTERMITTENT CLAUDICATION (CMS/HCC): Primary | ICD-10-CM

## 2019-11-01 DIAGNOSIS — I70.211 ATHEROSCLEROSIS OF NATIVE ARTERY OF RIGHT LOWER EXTREMITY WITH INTERMITTENT CLAUDICATION (CMS/HCC): ICD-10-CM

## 2019-11-01 DIAGNOSIS — I65.23 OCCLUSION AND STENOSIS OF BILATERAL CAROTID ARTERIES: ICD-10-CM

## 2019-11-01 PROCEDURE — 93922 UPR/L XTREMITY ART 2 LEVELS: CPT | Mod: 26 | Performed by: INTERNAL MEDICINE

## 2019-11-01 PROCEDURE — 99214 OFFICE O/P EST MOD 30 MIN: CPT | Mod: 25 | Performed by: PHYSICIAN ASSISTANT

## 2019-11-01 PROCEDURE — 93925 LOWER EXTREMITY STUDY: CPT | Mod: PO

## 2019-11-01 PROCEDURE — 93925 LOWER EXTREMITY STUDY: CPT | Mod: 26 | Performed by: INTERNAL MEDICINE

## 2019-11-01 PROCEDURE — G0463 HOSPITAL OUTPT CLINIC VISIT: HCPCS | Mod: PO | Performed by: PHYSICIAN ASSISTANT

## 2019-11-01 PROCEDURE — 93922 UPR/L XTREMITY ART 2 LEVELS: CPT | Mod: PO

## 2019-11-01 RX ORDER — PRAVASTATIN SODIUM 20 MG/1
20 TABLET ORAL DAILY
Qty: 30 TABLET | Refills: 11 | Status: SHIPPED | OUTPATIENT
Start: 2019-11-01 | End: 2019-11-07

## 2019-11-01 RX ORDER — ASPIRIN 81 MG/1
81 TABLET ORAL DAILY
Qty: 90 TABLET | Refills: 3 | Status: SHIPPED | OUTPATIENT
Start: 2019-12-25 | End: 2020-12-24 | Stop reason: WASHOUT

## 2019-11-01 ASSESSMENT — ENCOUNTER SYMPTOMS
CLAUDICATION: 1
SHORTNESS OF BREATH: 0
FALLS: 0
POOR WOUND HEALING: 0

## 2019-11-01 ASSESSMENT — PAIN SCALES - GENERAL: PAINLEVEL: 0-NO PAIN

## 2019-11-01 NOTE — PROGRESS NOTES
"Formerly [facility]  [address] [place] [id number]  [phone]      Cardiology Outpatient Progress Note      Subjective      Patient ID: Belkis Burris is a 78 y.o. female.    Chief Complaint:   Chief Complaint   Patient presents with   • PAD- Lower   • Carotid Artery Disease   .      HPI    Patient is a 78-year-old female with a history as outlined below.    She presents for follow-up for lower extremity peripheral arterial disease.  She is accompanied by her daughter who is a nurse.  She is made a dramatic improvement in her discomfort and weakness of the lower extremity since revascularization.  She is able to walk about 40 minutes on a daily basis.  She reports that it is a \"50%\" improvement and still has some residual weakness but does not feel as lifestyle limiting like it was in the past.    Regarding her atrial fibrillation she is been tolerating anticoagulation without any blood or black in her stool    From a carotid standpoint she denies any TIA or strokelike symptoms.    She has not been able to tolerate statin therapy.  She is open to considering other therapies.  She is tried Zetia as well but she discontinued as she was still having ongoing discomfort in her legs which in retrospect was likely her arterial insufficiency.    Patient denies any chest pain, arm, shoulder, neck, jaw, intrascapular pain, abnormal shortness of breath, dyspnea on exertion, orthopnea, paroxysmal nocturnal dyspnea, new or worsening lower extremity edema, abdominal swelling, decreased appetite, early siety, rapid fluid weight gain, presyncope, syncope, palpitations, racing heart, excessive diaphoresis, excessive fatigue, hematochezia, melena,  claudication, lower extremity ulcerations, rest pain of the lower extremities, gangrenous changes to the feet or toes, TIA or stroke-like symptoms including hemiparesthesias, hemiparalysis, slurred speech, visual loss.  Primary Cardiologist: Dr. Paulina DASH" CARDIAC  1.  Permanent atrial fibrillation  A.  On anticoagulation    2.  Statin intolerance      II. VASCULAR  1.  Carotid artery stenosis  A.  Left internal carotid artery stent  B.  Right internal carotid artery occlusion    2.  PAD  A.  Left common iliac stent, left distal SFA DCB (July 2019)  B.  Right mid and distal DCB (September 2019)      IV. OTHER  1.  Dyslipidemia    Last updated: Addy Cox PA-C 11/1/2019     Past Medical History:   Diagnosis Date   • A-fib (CMS/[facility]) ([facility])    • A-fib (CMS/[facility]) ([facility])    • Cardiovascular disease    • Complication of anesthesia    • Delayed emergence from general anesthesia    • DVT (deep venous thrombosis) (CMS/[facility]) ([facility])    • Dysrhythmia     Afib   • Hypercholesteremia    • Scoliosis    • TIA (transient ischemic attack)    • Wears dentures     upper   • Wears partial dentures     lower       Past Surgical History:   Procedure Laterality Date   • AIFF ANGIO Bilateral 7/24/2019    Procedure: Aiff Angio;  Surgeon: Kailash Gallardo MD;  Location: [facility] Cath Lab;  Service: Peripheral Vascular;  Laterality: Bilateral;  Late case #1 at 0830   • AIFF ANGIO Right 9/24/2019    Procedure: Aiff Angio staged;  Surgeon: Kailash Gallardo MD;  Location: [facility] Cath Lab;  Service: Peripheral Vascular;  Laterality: Right;   • ATHERECTOMY - PERIPHERAL N/A 7/24/2019    Procedure: Atherectomy - peripheral;  Surgeon: Kailash Gallardo MD;  Location: [facility] Cath Lab;  Service: Peripheral Vascular;  Laterality: N/A;   • ATHERECTOMY - PERIPHERAL N/A 9/24/2019    Procedure: Atherectomy - peripheral;  Surgeon: Kailash Gallardo MD;  Location: [facility] Cath Lab;  Service: Peripheral Vascular;  Laterality: N/A;   • CAROTID STENT     • PERIPHERAL ARTERIAL STENT GRAFT N/A 7/24/2019    Procedure: Peripheral artery stenting;  Surgeon: Kailash Gallardo MD;  Location: [facility] Cath Lab;  Service: Peripheral Vascular;  Laterality: N/A;   • TUBAL LIGATION         Allergies as of 11/01/2019 - Reviewed  10/01/2019   Allergen Reaction Noted   • Amoxicillin Itching 2017   • Metronidazole Hives 2017   • Sulfa (sulfonamide antibiotics) Itching 2017       Current Outpatient Medications   Medication Sig Dispense Refill   • vit A/vit C/vit E/zinc/copper (PRESERVISION AREDS ORAL) Take by mouth     • clopidogrel (PLAVIX) 75 mg tablet Take 1 tablet (75 mg total) by mouth daily 30 tablet 3   • diltiazem CD (CARDIZEM CD) 180 mg 24 hr capsule Take 1 capsule (180 mg total) by mouth daily. 90 capsule 3   • Lactobacillus acidophilus (PROBIOTIC) 10 billion cell capsule Take 1 capsule by mouth daily       • apixaban (ELIQUIS) 5 mg tablet Take 5 mg by mouth 2 (two) times a day       • dexlansoprazole (DEXILANT) 30 mg capsule Take 30 mg by mouth daily       • [START ON 2019] aspirin 81 mg EC tablet Take 1 tablet (81 mg total) by mouth daily 90 tablet 3   • pravastatin (Pravachol) 20 mg tablet Take 1 tablet (20 mg total) by mouth daily 30 tablet 11     No current facility-administered medications for this visit.        Family History   Problem Relation Age of Onset   • Other Mother         Complications of TB and emphysema, Cause of Death   • Blood Clots Father         Cause of Death   • Other Sister         Carotid endarterectomy   • Other Brother         Carotid endarterectomy       Social History     Tobacco Use   • Smoking status: Former Smoker     Packs/day: 0.50     Years: 10.00     Pack years: 5.00     Types: Cigarettes     Last attempt to quit: 2017     Years since quittin.8   • Smokeless tobacco: Never Used   Substance Use Topics   • Alcohol use: No   • Drug use: No         Review of Systems  Review of Systems   Cardiovascular: Positive for claudication (much better since procedure). Negative for chest pain.   Respiratory: Negative for shortness of breath.    Skin: Negative for poor wound healing.   Musculoskeletal: Negative for falls.     10 point review of systems performed. Positives listed and  "all others negative.     Objective     Vitals:    11/01/19 1518   BP: 121/84   Pulse: 77   SpO2: 98%   Height: 1.562 m (5' 1.5\")   Weight: 74.8 kg (165 lb)   PainSc: 0-No pain   Patient Position: Sitting     Weight: 74.8 kg (165 lb)    Physical Exam   Constitutional: She is oriented to person, place, and time. She appears well-developed and well-nourished. She is cooperative.   HENT:   Head: Normocephalic and atraumatic.   Eyes: Pupils are equal, round, and reactive to light. EOM are normal.   Neck: No hepatojugular reflux and no JVD present. Carotid bruit is present.   Cardiovascular: Normal rate, regular rhythm, S1 normal, S2 normal, normal heart sounds and intact distal pulses. Exam reveals no S3, no S4 and no friction rub.   No murmur heard.  Pulmonary/Chest: Effort normal and breath sounds normal. No respiratory distress. She has no decreased breath sounds. She has no wheezes. She has no rhonchi. She has no rales.   Abdominal: Soft. She exhibits no distension, no abdominal bruit and no ascites. There is no tenderness.   Musculoskeletal:         General: No edema.   Neurological: She is alert and oriented to person, place, and time. No cranial nerve deficit.   Skin: Skin is warm and dry.   Psychiatric: She has a normal mood and affect. Her speech is normal and behavior is normal.   Vitals reviewed.          Lipid:   Cholesterol   Date Value Ref Range Status   01/08/2019 279 mg/dL Final   05/28/2017 155 <200 mg/dL      HDL   Date Value Ref Range Status   01/08/2019 46 mg/dL Final   05/28/2017 28 (L) 40 - 60 mg/dL      Triglycerides   Date Value Ref Range Status   01/08/2019 137 mg/dL Final   05/28/2017 153 (H) <150 mg/dL      LDL Calculated   Date Value Ref Range Status   01/08/2019 206 mg/dL Final   05/28/2017 96 <100 mg/dL          Assessment/Plan   Diagnoses and all orders for this visit:    Atherosclerosis of native artery of right lower extremity with intermittent claudication (CMS/HCC) (HCC)  -     " Ambulatory referral to Cardiology  -     aspirin 81 mg EC tablet; Take 1 tablet (81 mg total) by mouth daily  -     US Iliac artery duplex bilateral; Future  -     pravastatin (Pravachol) 20 mg tablet; Take 1 tablet (20 mg total) by mouth daily  -     Lipid panel Blood, Venous; Future  -     US Arterial duplex lower extremity bilateral; Future  -     US Iliac artery duplex bilateral; Future  -     US Ankle / Brachial indices; Future  -     US Carotid duplex bilateral; Future  Continue with Plavix till December 24.  Then stop and then start aspirin 81 mg daily thereafter in conjunction with Eliquis.  She is willing to try pravastatin 20 mg daily.  If she does not tolerate due to myalgias discontinue.  Arterial duplex and ABIs were performed in the office today.  Official report still pending however based on the preliminary data there does appear to be patency of the previous intervention sites with normal ABIs.  Unfortunately the iliac stent on the left was not imaged.  We will get this next available.    She is doing great from a lower extremity standpoint.  Dramatic improvement in her symptoms since revascularization.  We will obtain surveillance imaging in 6 months time.  Recommend regular exercise.    Carotid artery stenosis  Previous left ICA stent.  Her last carotid duplex did not show any significant in-stent restenosis.  Right ICA chronic total occlusion.  Asymptomatic  Continue current medical therapy  Surveillance imaging 6 months    Atrial fibrillation  Permanent  [id number] score greater than 2  On anticoagulation.  Rate control strategy  Continue with diltiazem    Dyslipidemia  Intolerant of statin therapy  We will retry pravastatin 20 mg.  I would like her to enroll into our lipid clinic.  We will try to obtain PCSK9 inhibitor for the patient.    Occlusion and stenosis of bilateral carotid arteries  -     US Carotid duplex bilateral; Future          Return in about 6 months (around 5/1/2020) for  Addy Cox PA-C.      WILDER WAYNE  11/1/2019      A voice recognition program was used to aid in documentation of this record. Sometimes words are not presented exactly as they were spoken.  While efforts were made to carefully edit and correct any inaccuracies, some errors may be present.  Please take this into context.  Please contact the provider if errors are identified.

## 2019-11-04 ENCOUNTER — TELEPHONE (OUTPATIENT)
Dept: CARDIOLOGY | Facility: CLINIC | Age: 79
End: 2019-11-04

## 2019-11-04 NOTE — TELEPHONE ENCOUNTER
Spoke with patient informed needing lipid panel. Scheduled lipid panel Wednesday 0900 for lipid panel and to discuss PCSK 9 inhibitors. Told patient that daughter (who is a nurse) may come to scheduled lipid clinic visit due to questions she may have r/t PCSK 9. Patient will ask daughter to be present.

## 2019-11-05 ENCOUNTER — ANCILLARY PROCEDURE (OUTPATIENT)
Dept: CARDIOLOGY | Facility: CLINIC | Age: 79
End: 2019-11-05
Payer: MEDICARE

## 2019-11-05 PROCEDURE — 93978 VASCULAR STUDY: CPT | Mod: 26 | Performed by: INTERNAL MEDICINE

## 2019-11-05 PROCEDURE — 93978 VASCULAR STUDY: CPT | Mod: PO

## 2019-11-06 ENCOUNTER — TELEPHONE (OUTPATIENT)
Dept: CARDIOLOGY | Facility: CLINIC | Age: 79
End: 2019-11-06

## 2019-11-07 ENCOUNTER — SPECIALTY PHARMACY (OUTPATIENT)
Dept: PHARMACY | Facility: HOSPITAL | Age: 79
End: 2019-11-07

## 2019-11-07 ENCOUNTER — OFFICE VISIT NO LOS (OUTPATIENT)
Dept: LAB | Facility: CLINIC | Age: 79
End: 2019-11-07
Payer: MEDICARE

## 2019-11-07 ENCOUNTER — TELEPHONE (OUTPATIENT)
Dept: CARDIOLOGY | Facility: CLINIC | Age: 79
End: 2019-11-07

## 2019-11-07 VITALS
BODY MASS INDEX: 32.47 KG/M2 | SYSTOLIC BLOOD PRESSURE: 121 MMHG | WEIGHT: 172 LBS | HEIGHT: 61 IN | HEART RATE: 77 BPM | DIASTOLIC BLOOD PRESSURE: 84 MMHG

## 2019-11-07 DIAGNOSIS — E78.00 PURE HYPERCHOLESTEROLEMIA: ICD-10-CM

## 2019-11-07 DIAGNOSIS — E78.00 PURE HYPERCHOLESTEROLEMIA: Primary | ICD-10-CM

## 2019-11-07 LAB
HDL (AMB): 42 MG/DL (ref 40–60)
LDL (AMB): 217 MG/DL (ref ?–100)
TOTAL CHOLESTEROL (AMB): 285 MG/DL (ref ?–200)
TRIGLYCERIDES (AMB): 131 MG/DL (ref ?–150)

## 2019-11-07 PROCEDURE — 80061 LIPID PANEL: CPT | Mod: QW,PO

## 2019-11-07 PROCEDURE — 80061 LIPID PANEL: CPT | Mod: QW,PO | Performed by: PHYSICIAN ASSISTANT

## 2019-11-07 NOTE — PROGRESS NOTES
"Seen in lipid clinic today. Eats what she wants, but, try's to eat healthy (does not eat carbs, breads). Eats fruits and vegs. Weight 172 pounds Hy 5'1\" BMI 33.5. [place] Is not taking a statin due to side effects of muscles aching mostly in legs. Now after the stent placed,does feel better, but, has occ aching in legs after walking 45 minutes, rests for a few minutes, does feel somewhat better. Patient does not wan to go back on statins.Had been on Zetia, but, did not lower LDL. Discussed PCSK 9 inhibitor. Educated to cost, administration, and process. Patient wants to try and get Praluent or Repatha through [facility]. Plan: Praluent script 75 mg sure click pen e scribed to Specialty pharmacy for Medicare approval. Scheduled appt with Dr. Reece to establish cardiology. Continue eating healthy and walking [place]. Follow up after starting PCSK 9. Dr. Reece signed Praluent 75 mg sure click pen script for patient to take to [facility] pharmacy to check on the abiltiy of getting Praluent for patient. Patient was given PASS forms to fill out and return to Lipid clinic pending outcome at [facility]..  "

## 2019-11-07 NOTE — TELEPHONE ENCOUNTER
Patient will be placed in recall to schedule follow up appt with cardiologist in 6 months (Dr Reece).

## 2019-11-25 LAB
LEFT ABI: 1.04
LEFT ANT TIBIAL SYS PSV: 46 CM/S
LEFT ANTERIOR TIBIAL: 139 MMHG
LEFT ARM BP: 137 MMHG
LEFT PERONEAL SYS PSV: 181 CM/S
LEFT PERONEAL SYS RATIO: 3.9
LEFT POST TIBIAL SYS PSV: 238 CM/S
LEFT POST TIBIAL SYS RATIO: 2.3
LEFT POSTERIOR TIBIAL: 142 MMHG
LEFT PROFUNDA SYS PSV: 71 CM/S
LEFT SUPER FEMORAL PROX SYS PSV: 125 CM/S
LEFT TIB/PER TRUNK SYS PSV: 97 CM/S
LL ARTERIAL DUPLEX COMMON FEMORAL PEAK SYS VEL: 136 CM/S
LL ARTERIAL DUPLEX POPLITEAL PEAK SYS VEL: 71 CM/S
RH LT LOWER ART ANT TIB DIST RATIO: 2
RH LT LOWER ART ANT TIB DIST: 49 CM/S
RH LT LOWER ART ANT TIB MID: 78
RH LT LOWER ART PER DIST: 23 CM/S
RH LT LOWER ART POP DIST: 92 CM/S
RH LT LOWER ART POST TIB DIST: 102 CM/S
RH LT LOWER ART SFA DIST: 102
RH LT LOWER ART SFA MID RATIO: 1.7
RH LT LOWER ART SFA MID: 124 CM/S
RH RT LOWER ART ANT TIB DIST RATIO: 1.5
RH RT LOWER ART ANT TIB DIST: 122 CM/S
RH RT LOWER ART ANT TIBIAL SYS PSV: 53 CM/S
RH RT LOWER ART PER DIST: 49 CM/S
RH RT LOWER ART POP DIST: 80 CM/S
RH RT LOWER ART POST TIB DIST: 73 CM/S
RH RT LOWER ART SFA DIST: 48 CM/S
RH RT LOWER ART SFA MID: 80 CM/S
RIGHT ABI: 0.99
RIGHT ANTERIOR TIBIAL: 134 MMHG
RIGHT ARM BP: 135 MMHG
RIGHT PERONEAL SYS PSV: 67 CM/S
RIGHT POST TIBIAL SYS PSV: 51 CM/S
RIGHT POSTERIOR TIBIAL: 135 MMHG
RIGHT PROFUNDA SYS PSV: 90 CM/S
RIGHT SUPER FEMORAL PROX SYS PSV: 103 CM/S
RIGHT TIB/PER TRUNK SYS PSV: 65 CM/S
RL ARTERIAL DUPLEX COMMON FEMORAL PEAK SYS VEL: 107 CM/S
RL ARTERIAL DUPLEX POPLITEAL PEAK SYS VEL: 74 CM/S

## 2019-11-27 ENCOUNTER — TELEPHONE (OUTPATIENT)
Dept: CARDIOLOGY | Facility: CLINIC | Age: 79
End: 2019-11-27

## 2019-11-27 NOTE — TELEPHONE ENCOUNTER
Spoke with patient r/t getting started on PCSK 9 inhibitor. [facility] submitted Praluent 75 mg sure click pen script to specialty pharmacy. Patient receiving Praluent 75 mg and taking injections without problems. [facility] to continue managing chol.

## 2019-12-13 ENCOUNTER — TELEPHONE (OUTPATIENT)
Dept: CARDIOLOGY | Facility: CLINIC | Age: 79
End: 2019-12-13

## 2019-12-13 NOTE — TELEPHONE ENCOUNTER
Returned call to patient-informed lipid clinic had difficult pushing the button down when injecting. Some medication went in and some did not. Patient called the company, will receive another pen and to send the faluty pen back to the company.Questioning whether or not to take a second injection.Told patient to wait and take the next injection as scheduled. Patient agreed.

## 2020-01-06 ENCOUNTER — CLINICAL SUPPORT (OUTPATIENT)
Dept: RESEARCH | Facility: OTHER | Age: 80
End: 2020-01-06
Payer: MEDICARE

## 2020-01-06 DIAGNOSIS — Z00.6 RESEARCH SUBJECT: ICD-10-CM

## 2020-01-06 PROCEDURE — 3700 RSCH CLEAR OFF IP SCHEDULED PHONE CALL

## 2020-01-06 NOTE — RESEARCH NOTE
Active Trial: CLEAR    Name: Belkis Burris  : 1940  Age:  79 y.o.  Sex: female    Appt. Location:  CLINICAL RESEARCH   CLINICAL RESEARCH  [address] [place] [id number] [phone]      Trial Information:   Visit Summary: Belkis was contacted by phone today to complete her Month 12 visit for the CLEAR Trial.  Belkis informed me today she will not re-challenge the study drug.  She agreed to be contacted with phone calls only.  She is currently in [place] until April. Belkis discussed temporary discontinuation of her study drug with another follow up coordinator back in October and that time she stated the reason she didn't want to take the study medication any longer,was her lab results were not showing improvement according to her physician.  He started her on Praluent  75mg injections. A consent was mailed to her current address in [place] for change of consent and she agreed to mail it back signed. She will not be able to return her OLD IP until she returns to [place] in April.    Medications were reviewed and reconciled.  Adverse events were reviewed and she denies any events. She has not had any hospitalizations, emergency room, or urgent care visits.  She denies any cardiac or bleeding events.    OLD IP will be returned per subject in April when she returns home.  She has been off IP since 2019.    She was thanked for her continuation with the Trial and change of consent was mailed to her.      Study Drug Compliance:   Patient is compliant with Study drug? N/A      Events:    Adverse Events: None reported     Serious Adverse Events: None reported     Outcome Events: None reported     Labs:  NO LABS DRAWN

## 2020-04-01 ENCOUNTER — CLINICAL SUPPORT (OUTPATIENT)
Dept: RESEARCH | Facility: OTHER | Age: 80
End: 2020-04-01
Payer: MEDICARE

## 2020-04-01 DIAGNOSIS — Z00.6 RESEARCH SUBJECT: Primary | ICD-10-CM

## 2020-04-01 NOTE — RESEARCH NOTE
The subject was contacted to assure that the CLEAR trial will continue.  Phone visits will likely replace in-person visits but will continue on schedule.  Belkis is phone visits only. The subject was queried regarding any interim adverse events or concerns.  She is feeling well.   Belkis is in [place] and will continue to stay there until she can get back to [place] because of the COVID-19. We verbally confirmed that the subjects was not experiencing fever, cough, shortness of breath or sore throat and advised that if these symptoms did occur, she is currently in [place] so I adviced her if she were to become ill to contact her physician in [place] and she completely understood.    The subject's support system was briefly assessed and the subject feels that they are equipped to stay sheltered for the next several weeks and have support if they should need it.  The subjects was asked to call us with any questions or concerns regarding their trial participation.    I conducted Belkis's T7/M15 visit also today.      Medications were reviewed and she denies any changes.  Adverse events were reviewed and she denies any events. She has not had any hospitalizations or emergency room visits. She denies any Cardiac or heart failure events.  She is off IP and will continue with phone visits only. She does follow her heart healthy diet and exercise program.    She was thanked for her participation in research and encouraged to call with any questions or concerns.

## 2020-05-02 PROCEDURE — 3700 RSCH CLEAR SAE (COMPLETE SUPPLEMENTAL CHARGE SHEET): Mod: RPD | Performed by: NURSE PRACTITIONER

## 2020-05-08 ENCOUNTER — TELEPHONE (OUTPATIENT)
Dept: CARDIOLOGY | Facility: CLINIC | Age: 80
End: 2020-05-08

## 2020-05-08 NOTE — TELEPHONE ENCOUNTER
PT stated she has an appt on 5/11/2020 and she feels she should also have blood work as she was hospitalized within the last few days.

## 2020-05-11 ENCOUNTER — ANCILLARY PROCEDURE (OUTPATIENT)
Dept: CARDIOLOGY | Facility: CLINIC | Age: 80
End: 2020-05-11
Payer: MEDICARE

## 2020-05-11 DIAGNOSIS — I70.211 ATHEROSCLEROSIS OF NATIVE ARTERY OF RIGHT LOWER EXTREMITY WITH INTERMITTENT CLAUDICATION (CMS/HCC): ICD-10-CM

## 2020-05-11 DIAGNOSIS — I73.9 PAD (PERIPHERAL ARTERY DISEASE) (CMS/HCC): ICD-10-CM

## 2020-05-11 LAB
LEFT CCA DIST DIAS: 40 CM/S
LEFT CCA DIST SYS: 143 CM/S
LEFT CCA MID DIAS: 54 CM/S
LEFT CCA MID SYS: 177 CM/S
LEFT CCA PROX DIAS: 52
LEFT CCA PROX SYS: 150 CM/S
LEFT ECA SYS: 209 CM/S
LEFT ICA DIST DIAS: 43 CM/S
LEFT ICA DIST SYS: 121 CM/S
LEFT ICA MID DIAS: 52 CM/S
LEFT ICA MID SYS: 140 CM/S
LEFT ICA PROX DIAS: 43 CM/S
LEFT ICA PROX SYS: 133 CM/S
LEFT ICA/CCA SYS: 1
LEFT VERTEBRAL DIAS: 17 CM/S
LEFT VERTEBRAL SYS: 60 CM/S
RH LEFT CAROTID BULB EDV: 30 CM/S
RH LEFT CAROTID BULB PSV: 109 CM/S
RH RIGHT CAROTID BULB EDV: 20 CM/S
RH RIGHT CAROTID BULB PSV: 88 CM/S
RH RIGHT CAROTID BULB/CCA: 2.8
RIGHT CCA DIST DIAS: 7 CM/S
RIGHT CCA DIST SYS: 31 CM/S
RIGHT CCA MID DIAS: 8 CM/S
RIGHT CCA MID SYS: 38 CM/S
RIGHT CCA PROX DIAS: 0 CM/S
RIGHT CCA PROX SYS: 16 CM/S
RIGHT ECA DIAS: 21 CM/S
RIGHT ECA SYS: 133 CM/S
RIGHT ICA DIST SYS: 0
RIGHT ICA MID SYS: 0
RIGHT ICA PROX SYS: 0
RIGHT VERTEBRAL DIAS: 19 CM/S
RIGHT VERTEBRAL SYS: 67 CM/S

## 2020-05-11 PROCEDURE — 93922 UPR/L XTREMITY ART 2 LEVELS: CPT | Mod: 26 | Performed by: INTERNAL MEDICINE

## 2020-05-11 PROCEDURE — 93922 UPR/L XTREMITY ART 2 LEVELS: CPT | Mod: PO

## 2020-05-11 PROCEDURE — 93880 EXTRACRANIAL BILAT STUDY: CPT | Mod: 26 | Performed by: INTERNAL MEDICINE

## 2020-05-11 PROCEDURE — 93925 LOWER EXTREMITY STUDY: CPT | Mod: 26 | Performed by: INTERNAL MEDICINE

## 2020-05-11 PROCEDURE — 93880 EXTRACRANIAL BILAT STUDY: CPT | Mod: PO

## 2020-05-11 PROCEDURE — 93925 LOWER EXTREMITY STUDY: CPT | Mod: PO

## 2020-05-14 LAB
LEFT ABI: 1.07
LEFT ANT TIBIAL SYS PSV: 43 CM/S
LEFT ANTERIOR TIBIAL: 128 MMHG
LEFT ARM BP: 128 MMHG
LEFT PERONEAL SYS PSV: 89 CM/S
LEFT POST TIBIAL SYS PSV: 81 CM/S
LEFT POSTERIOR TIBIAL: 144 MMHG
LEFT PROFUNDA SYS PSV: 55 CM/S
LEFT SUPER FEMORAL PROX SYS PSV: 148 CM/S
LEFT TIB/PER TRUNK SYS PSV: 107 CM/S
LL ARTERIAL DUPLEX COMMON FEMORAL PEAK SYS VEL: 180 CM/S
LL ARTERIAL DUPLEX POPLITEAL PEAK SYS VEL: 120 CM/S
RH LT LOWER ART ANT TIB DIST: 105 CM/S
RH LT LOWER ART PER DIST: 109 CM/S
RH LT LOWER ART POP DIST: 86 CM/S
RH LT LOWER ART POST TIB DIST: 119 CM/S
RH LT LOWER ART SFA DIST: 115 CM/S
RH LT LOWER ART SFA MID: 97 CM/S
RH RT LOWER ART ANT TIB DIST: 37 CM/S
RH RT LOWER ART ANT TIBIAL SYS PSV: 47 CM/S
RH RT LOWER ART PER DIST: 35 CM/S
RH RT LOWER ART POP DIST: 51 CM/S
RH RT LOWER ART POST TIB DIST: 53 CM/S
RH RT LOWER ART SFA DIST: 46 CM/S
RH RT LOWER ART SFA MID RATIO: 5.7
RH RT LOWER ART SFA MID: 535 CM/S
RIGHT ABI: 0.7
RIGHT ANTERIOR TIBIAL: 91 MMHG
RIGHT ARM BP: 134 MMHG
RIGHT PERONEAL SYS PSV: 39 CM/S
RIGHT POST TIBIAL SYS PSV: 48 CM/S
RIGHT POSTERIOR TIBIAL: 94 MMHG
RIGHT PROFUNDA SYS PSV: 84 CM/S
RIGHT SUPER FEMORAL PROX SYS PSV: 88 CM/S
RIGHT TIB/PER TRUNK SYS PSV: 43 CM/S
RL ARTERIAL DUPLEX COMMON FEMORAL PEAK SYS VEL: 96 CM/S
RL ARTERIAL DUPLEX POPLITEAL PEAK SYS VEL: 64 CM/S

## 2020-05-15 ENCOUNTER — TELEPHONE - BILLABLE (OUTPATIENT)
Dept: CARDIOLOGY | Facility: CLINIC | Age: 80
End: 2020-05-15
Payer: MEDICARE

## 2020-05-15 DIAGNOSIS — I67.2 CEREBRAL ATHEROSCLEROSIS: ICD-10-CM

## 2020-05-15 DIAGNOSIS — I73.9 PAD (PERIPHERAL ARTERY DISEASE) (CMS/HCC): Primary | ICD-10-CM

## 2020-05-15 PROCEDURE — 99443 *INACTIVE DO NOT USE* PR PHYS/QHP TELEPHONE EVALUATION 21-30 MIN: CPT | Mod: 95 | Performed by: PHYSICIAN ASSISTANT

## 2020-05-15 RX ORDER — PANTOPRAZOLE SODIUM 40 MG/1
40 TABLET, DELAYED RELEASE ORAL 2 TIMES DAILY
COMMUNITY
End: 2021-06-09 | Stop reason: ALTCHOICE

## 2020-05-15 ASSESSMENT — ENCOUNTER SYMPTOMS
SHORTNESS OF BREATH: 1
PALPITATIONS: 1

## 2020-05-15 NOTE — PROGRESS NOTES
[facility]  [address] [place] [id number]  [phone]      Cardiology Outpatient Telephone- Billable Progress Note      Subjective      Patient ID: Belkis Burris is a 79 y.o. female.    Chief Complaint: No chief complaint on file.  .    Per discussion with WILDER WAYNE, Belkis, has verbally consented to be treated via a telephone based visit: Yes. A total of 23 minutes were required for this telephone based visit.   Patient Location: Home  Provider Location: Clinic  Technology used by Provider: Phone       HPI    Due to the COVID-19 global pandemic with recommendations from the medical societies including the CDC of social distancing and reducing the risk of transmission/infection, patient's office visit was changed to a telehealth visit encounter.  Any non-emergent or non-urgent associated diagnostic testing has also been delayed because of these restrictions and recommendations to reduce the risk of transmission/complications associated with the virus.  Shared decision has been made between the patient and myself to abide by these recommendations.    Patient is a 79-year-old female with a history as outlined below under specialty comments.    I last saw her in the office in November 2019 she had a dramatic improvement in her lower extremity weakness since revascularization.  She was able to walk about 40 minutes on a daily basis.  Still has some mild residual weakness but it was not lifestyle limiting like it was in the past.  She is unable to tolerate statin therapy.  She is tried Zetia but discontinued as was having ongoing discomfort in her legs which in retrospect she admitted was likely related to her arterial disease.  She was willing to start pravastatin 20 mg.  Enroll into our lipid clinic to try initiate on PCSK9 inhibitor.    She had a lipid panel on 11/7/2019 that showed an LDL of 217, HDL 42, triglycerides 131, total cholesterol 285.  It sounds that she was  "able to start Praluent.    Unfortunately patient was recently hospitalized and [place] due to acute blood loss due to a Lisa-Starks tear.  She had eaten barbecue the night before and thought that she prior to food poisoning as she was vomiting and she had coffee-ground emesis in retrospect.  However she thought it was probably the barbecue sauce which was quite black and dark as well.  She felt better but was having significant fatigue.  She then noticed that she had melena and proceeded to the urgent care with positive stool sample sent to the ER.  EGD revealed a large nonbleeding Lisa-Starks tear with stigmata of recent bleed but no active bleeding found.  No gastric ulcers.  Recommended Protonix 40 mg twice daily and GI felt that she could restart her Eliquis 5 mg twice daily in addition to her aspirin.  Her other hospitalization was complicated by atrial fibrillation with rapid ventricular response at 120-130 bpm.  She had not taken her Cardizem but after this was reinitiated rates were controlled.  She was discharged home in stable condition.    She follows up with us in regards to her lower extremity peripheral arterial disease and carotid artery stenosis.  From a carotid standpoint denies any TIA or strokelike symptoms    Regarding her decline in hemoglobin she has not followed up with her PCP yet.  She has not had a repeat CBC.  Hemoglobin on discharge on 5/3/2020 was 9.8.  She feels that her fatigue continues to gradually improve but \"took a lot out of her\".    Leading up to this she is actually been doing fantastic.  She was in [place] for the winter.  She was walking every single day about 40-45 minutes without any lower extremity claudication.  She is feeling very well overall.  Since that time she felt that her lower extremities are weak bilaterally.  However she overall feels a little bit weak since her Lisa-Starks tear.  She feels that is slowly improving her lower extremity weakness.  She does " not specifically feel that her right lower extremity is any worse than her left.    Regarding her atrial fibrillation she denies any palpitations or racing heart.  She denies any further melena.    She is very curious to see if her PCSK9 inhibitor is working in regards to her lipid panel which we will need to obtain.    Patient denies any chest pain, arm, shoulder, neck, jaw, intrascapular pain, abnormal shortness of breath, dyspnea on exertion, orthopnea, paroxysmal nocturnal dyspnea, new or worsening lower extremity edema, abdominal swelling, decreased appetite, early siety, rapid fluid weight gain, presyncope, syncope, palpitations, racing heart, excessive diaphoresis, excessive fatigue, hematochezia, melena,  claudication, lower extremity ulcerations, rest pain of the lower extremities, gangrenous changes to the feet or toes, TIA or stroke-like symptoms including hemiparesthesias, hemiparalysis, slurred speech, visual loss.    Primary Cardiologist: Dr. Gallardo      I. CARDIAC  1. Permanent atrial fibrillation  A. On anticoagulation    2. Statin intolerance      II. VASCULAR  1. Carotid artery stenosis  A. Left internal carotid artery stent  B. Right internal carotid artery occlusion    2. PAD  A. Left common iliac stent, left distal SFA DCB (July 2019)  B. Right mid and distal DCB (September 2019)      IV. OTHER  1. Dyslipidemia    Last updated: Addy Cox PA-C 5/1/20     Past Medical History:   Diagnosis Date   • A-fib (CMS/[facility]) ([facility])    • A-fib (CMS/[facility]) ([facility])    • Cardiovascular disease    • Complication of anesthesia    • Delayed emergence from general anesthesia    • DVT (deep venous thrombosis) (CMS/[facility]) ([facility])    • Dysrhythmia     Afib   • Hypercholesteremia    • Scoliosis    • TIA (transient ischemic attack)    • Wears dentures     upper   • Wears partial dentures     lower       Past Surgical History:   Procedure Laterality Date   • AIFF ANGIO Bilateral 7/24/2019    Procedure: Aiff Angio;   Surgeon: Kailash Gallardo MD;  Location: [facility];  Service: Peripheral Vascular;  Laterality: Bilateral;  Late case #1 at 0830   • AIFF ANGIO Right 9/24/2019    Procedure: Aiff Angio staged;  Surgeon: Kailash Gallardo MD;  Location: [facility];  Service: Peripheral Vascular;  Laterality: Right;   • ATHERECTOMY - PERIPHERAL N/A 7/24/2019    Procedure: Atherectomy - peripheral;  Surgeon: Kailash Gallardo MD;  Location: [facility];  Service: Peripheral Vascular;  Laterality: N/A;   • ATHERECTOMY - PERIPHERAL N/A 9/24/2019    Procedure: Atherectomy - peripheral;  Surgeon: Kailash Gallardo MD;  Location: [facility];  Service: Peripheral Vascular;  Laterality: N/A;   • CAROTID STENT     • PERIPHERAL ARTERIAL STENT GRAFT N/A 7/24/2019    Procedure: Peripheral artery stenting;  Surgeon: Kailash Gallardo MD;  Location: [facility];  Service: Peripheral Vascular;  Laterality: N/A;   • TUBAL LIGATION         Allergies as of 05/15/2020 - Reviewed 10/01/2019   Allergen Reaction Noted   • Amoxicillin Itching 05/26/2017   • Metronidazole Hives 05/26/2017   • Sulfa (sulfonamide antibiotics) Itching 05/26/2017       Current Outpatient Medications   Medication Sig Dispense Refill   • alirocumab (Praluent Pen) 75 mg/mL pen injector Inject 75 mg under the skin every 14 (fourteen) days 6 pen 3   • aspirin 81 mg EC tablet Take 1 tablet (81 mg total) by mouth daily 90 tablet 3   • vit A/vit C/vit E/zinc/copper (PRESERVISION AREDS ORAL) Take by mouth     • clopidogrel (PLAVIX) 75 mg tablet Take 1 tablet (75 mg total) by mouth daily 30 tablet 3   • diltiazem CD (CARDIZEM CD) 180 mg 24 hr capsule Take 1 capsule (180 mg total) by mouth daily. 90 capsule 3   • Lactobacillus acidophilus (PROBIOTIC) 10 billion cell capsule Take 1 capsule by mouth daily       • apixaban (ELIQUIS) 5 mg tablet Take 5 mg by mouth 2 (two) times a day       • dexlansoprazole (DEXILANT) 30 mg capsule Take 30 mg by mouth daily         No  current facility-administered medications for this visit.        Family History   Problem Relation Age of Onset   • Other Mother         Complications of TB and emphysema, Cause of Death   • Blood Clots Father         Cause of Death   • Other Sister         Carotid endarterectomy   • Other Brother         Carotid endarterectomy       Social History     Tobacco Use   • Smoking status: Former Smoker     Packs/day: 0.50     Years: 10.00     Pack years: 5.00     Types: Cigarettes     Last attempt to quit: 2017     Years since quitting: 3.3   • Smokeless tobacco: Never Used   Substance Use Topics   • Alcohol use: No   • Drug use: No         Review of Systems  Review of Systems   Constitution: Positive for malaise/fatigue.   Cardiovascular: Positive for palpitations. Negative for chest pain and leg swelling/pain.   Respiratory: Positive for shortness of breath.      10 point review of systems performed. Positives listed and all others negative.     Objective     VITALS:  Deferred due to Telemedicine Visit    PHYSICAL EXAM:  Deferred due to Telemedicine Visit      Lipid:   Total cholesterol   Date Value Ref Range Status   11/07/2019 285 (A) 200 mg/dL Final     HDL   Date Value Ref Range Status   11/07/2019 42 40 - 60 mg/dL Final     Triglycerides   Date Value Ref Range Status   11/07/2019 131 150 mg/dL Final     LDL Calculated   Date Value Ref Range Status   01/08/2019 206 mg/dL Final   05/28/2017 96 <100 mg/dL          Assessment/Plan   Diagnoses and all orders for this visit:    PAD (peripheral artery disease) (CMS/[facility]) ([facility])  -     Lipid panel Blood, Venous; Future    Arterial duplex and ABIs were reviewed from 5/11/2020.  Shows that her left lower extremity is patent.  Right lower extremity distal SFA following the previous atherectomy site from September shows severe stenosis with a peak velocity of 5.3 m/s.  Three-vessel runoff to the foot.  EL 0.7.    She does have currently lower extremity weakness bilaterally but  it sounds that this started right after her Lisa-Starks tear.  This is gradually improving.  Prior to this she was having no symptoms and exercising on a regular basis for 40-45 minutes without any claudication symptoms.    At this point I recommend conservative medical therapy.  Continue regular exercise to promote neovascularization.  Given this new onset of weakness of her legs would like to keep a closer eye on her and we will plan on following up in the office in about 3 months.  If she has worsening symptoms prior to this she is to contact our office.    Carotid artery stenosis  Right internal carotid artery occlusion  Left ICA stent that is patent  Plan for surveillance 1 year  Continue with aspirin, PCSK9 inhibitor, (it says in her medication list that she is on clopidogrel but she is not taking this any longer).    Atrial fibrillation  On Eliquis which  has approved to her to resume.  Apparently she is only been taking 5 mg once a day.  She needs to increase this back to 5 mg twice daily as directed on her medication bottle.  She was confused when she left the hospital in [place] as this was only ordered for once a day.  I see GIs note and they are okay with 5 mg twice a day.  Regarding her heart rates were well controlled when she was discharged.  She is back on Cardizem and her heart rates on her blood pressure monitor run between 70-80 bpm.  No further melena  Continue to monitor carefully    Lisa-Starks tear  Continue to monitor for any coffee-ground emesis or melena in her stool  I recommend that she follow-up with her PCP with a repeat CBC next available    Dyslipidemia  Last lipid panel was from January 2019  She started PCSK9 inhibitor since this time will need updated lipid panel  We will get this arranged through her PCP as she is going to follow-up with them regarding her anemia and Lisa-Starks tear.      Cerebral atherosclerosis   -     Lipid panel Blood, Venous; Future        Patricia E  ASHA Colin  5/15/2020      A voice recognition program was used to aid in documentation of this record. Sometimes words are not presented exactly as they were spoken.  While efforts were made to carefully edit and correct any inaccuracies, some errors may be present.  Please take this into context.  Please contact the provider if errors are identified.

## 2020-05-19 ENCOUNTER — TELEPHONE (OUTPATIENT)
Dept: CARDIOLOGY | Facility: CLINIC | Age: 80
End: 2020-05-19

## 2020-05-19 DIAGNOSIS — Z83.2 FAMILY HISTORY OF FACTOR V LEIDEN MUTATION: Primary | ICD-10-CM

## 2020-05-19 NOTE — TELEPHONE ENCOUNTER
Daughter calls today asking if her mother can have a Factor V blood test tomorrow with her other labs that were ordered as it runs in the family.  Her daughter is also concerned because 2 weeks ago after duplex studies, it was decided no intervention was necessary at this time.  The daughter is highly concerned that her mother's mobility has been drastically compromised due to pain with ambulation.  Will forward to Addy for recommendations.

## 2020-05-19 NOTE — PROGRESS NOTES
Spoke with her daughter by phone today.  She does have concerns about her mother's lower extremity weakness.  She even reports though that she was doing relatively well up until her Lisa-Starks tear.  She still continues to deal with weakness.  She still is not made an appointment with her PCP with a repeat hemoglobin.    The patient's daughter is also concerned about family history of factor V.  I did instruct her that she likely does not meet criteria for testing and this may be a complete out of pocket expense for the family if they would like to learn about this further.  They are willing to pay for this out of pocket and she would like to move forward with this testing.  Regardless I did instruct her that the patient is already on anticoagulation long-term anyway and even if this is identified no change will be made in clinical management.    It is her perception that she was doing well prior to her Lisa-Starks tear.  She continues to slowly improve.  Shared decision making to hold off on right lower extremity intervention.  We will plan on changing her follow-up appointment to 6 weeks.  She would like to come into our office and get a lipid panel and the factor V Leiden tomorrow.  She will plan to make a follow-up appointment at her PCP for her CBC and BMP   no

## 2020-05-19 NOTE — TELEPHONE ENCOUNTER
Pt's daughter wants to know if her mom can get a factor 5 lab done on her mother.     Call her daughter Bee, because she has more questions about her mother's results for her legs.     WILLEM pt

## 2020-05-19 NOTE — TELEPHONE ENCOUNTER
Called Bee and left a message letting her know her mom's appointment are tomorrow at 1145 (NPO) and her telehealth appointment with Addy is on July 1st at 1300. Encouraged her to call with questions.

## 2020-05-20 ENCOUNTER — APPOINTMENT (OUTPATIENT)
Dept: LAB | Facility: CLINIC | Age: 80
End: 2020-05-20
Payer: MEDICARE

## 2020-05-20 ENCOUNTER — TELEPHONE (OUTPATIENT)
Dept: CARDIOLOGY | Facility: CLINIC | Age: 80
End: 2020-05-20

## 2020-05-20 DIAGNOSIS — I70.211 ATHEROSCLEROSIS OF NATIVE ARTERY OF RIGHT LOWER EXTREMITY WITH INTERMITTENT CLAUDICATION (CMS/HCC): ICD-10-CM

## 2020-05-20 DIAGNOSIS — I67.2 CEREBRAL ATHEROSCLEROSIS: ICD-10-CM

## 2020-05-20 DIAGNOSIS — Z83.2 FAMILY HISTORY OF FACTOR V LEIDEN MUTATION: ICD-10-CM

## 2020-05-20 DIAGNOSIS — I73.9 PAD (PERIPHERAL ARTERY DISEASE) (CMS/HCC): ICD-10-CM

## 2020-05-20 NOTE — TELEPHONE ENCOUNTER
Bee called wondering if [facility] could draw the labs we needed since we could not get them today. I called and spoke with their lab, which apparently they cannot draw them unless the order comes from one of their providers. They suggested [facility], which I sent the orders to. I left a message for Bee on her cell phone and encouraged her to call with questions.

## 2020-05-22 ENCOUNTER — TELEPHONE (OUTPATIENT)
Dept: CARDIOLOGY | Facility: CLINIC | Age: 80
End: 2020-05-22

## 2020-05-22 LAB
EXTERNAL CHOLESTEROL (MG/DL) IN SER/PLAS: 108 MG/DL
EXTERNAL CHOLESTEROL IN HDL (MG/DL) IN SER/PLAS: 39 MG/DL
EXTERNAL CHOLESTEROL IN LDL (MG/DL) IN SERUM OR PLASMA BY CALCULATION: 55 MG/DL
EXTERNAL TRIGLYCERIDE (MG/DL) IN SER/PLAS: 72 MG/DL

## 2020-05-22 NOTE — TELEPHONE ENCOUNTER
Per patient's daughter, Dr. Srriam Gamboa kecia a lipid panel, CBC, other labs today. She only needs the Factor V Leiden drawn at [facility] today. She asked that I call and clarify this to them, which I did.

## 2020-05-27 ENCOUNTER — TELEPHONE (OUTPATIENT)
Dept: CARDIOLOGY | Facility: CLINIC | Age: 80
End: 2020-05-27

## 2020-05-27 NOTE — TELEPHONE ENCOUNTER
Called patient with results. She was very happy and had no questions at this time.       ----- Message from WILDER Coello sent at 5/27/2020  2:24 PM MDT -----  Please let the patient know that her factor V Leiden has come back negative.    Additionally her lipid panel is very impressive with her LDL 55, HDL 39.  In comparison her LDL was 206 one year ago.

## 2020-06-08 ENCOUNTER — TELEPHONE - BILLABLE (OUTPATIENT)
Dept: CARDIOLOGY | Facility: CLINIC | Age: 80
End: 2020-06-08
Payer: MEDICARE

## 2020-06-08 DIAGNOSIS — I48.91 ATRIAL FIBRILLATION AND FLUTTER (CMS/HCC): ICD-10-CM

## 2020-06-08 DIAGNOSIS — I48.92 ATRIAL FIBRILLATION AND FLUTTER (CMS/HCC): ICD-10-CM

## 2020-06-08 PROBLEM — K21.9 GERD (GASTROESOPHAGEAL REFLUX DISEASE): Status: ACTIVE | Noted: 2020-05-02

## 2020-06-08 PROCEDURE — 99443 *INACTIVE DO NOT USE* PR PHYS/QHP TELEPHONE EVALUATION 21-30 MIN: CPT | Mod: 95 | Performed by: INTERNAL MEDICINE

## 2020-06-08 NOTE — PROGRESS NOTES
Cardiology Telephone Encounter Note                                            Assessment/ Plan:    Atrial Fibrillation  Likely permanent afib. Has been for at least 3 years. CHADSVASc 6. TTE from 2017 showed normal biatrial size and EF.  -rate control strategy after meeting with EP  -continue cardizem  -eliquis  -Would be reasonable to repeat a 24 to 48-hour Holter monitor to ensure her rates are controlled, she had one back in 2018 that showed relatively controlled rates.  She currently feels well enough and does not want to pursue anything further at present.  -We will see patient back in 1 year unless clinical change then will  see back sooner    History of CVA  Carotid Artery Stenosis  LICA stent on 7/11/17, known occluded VIRI.  Findings stable on repeat duplex 5/2020 with a patent LICA stent and no significant change from prior study.  -repeat duplex in 5/2021    Hypertension  Was normal at recent doctor visit  -encouraged patient to check BP at home and maintain a log  -maintain <2g salt restricted diet  -cardizem  -Reviewed with patient how to check blood pressures at home after relaxing for 5 minutes sitting comfortably in a quiet environment.  She will also take her blood pressure cuff with her to a doctor's visit to ensure that it is calibrated appropriately    PAD  Repeat duplex does show 75 to 99% stenosis in the right SFA following angioplasty and atherectomy on 9/2019.  This has progressed from prior report 11/2019.  -No claudication only leg fatigue.   -Reasonable to medically manage at this time, though did encourage patient to continue walking as she has been relatively inactive recently.  If symptoms do arise that are more concerning then will proceed with invasive angiography  -Would be reasonable to repeat study in 6 months    Dyslipidemia  Last lipid panel 5/22/20 showed LDL 55, HDL 39, TG's 72, nonHDL 70.  Goal LDL <70 and nonHDL <100. Statin intolerant from myalgias.    H/O  DVT  -eliquis    History Upper GI Bleed  Secondary to Lisa Imani tear and had no intervention. Has not had any black or red stools.     Obesity  BMI 33.  -counseled on diet and lifestyle modification    HPI:  Per discussion with Leo Reece DO, Naomi, has verbally consented to be treated via a telephone based visit: Yes. A total of  22 minutes were required for this telephone based visit.   Patient Location: Home  Provider Location: Clinic  Technology used by Provider: Phone.  Cheif complaint and reason for visit: atrial fibrillation    States she is doing fairly well overall. She was recently placed on iron tablets which has been helping her fatigue. States she had a tear in esophagous. No swallowing difficulties or gerd. No cp, sob, orthostasis, dizzziness. Mild edema. Does not weigh daily. Clothes not fitting tighter.      States she is not walking much but only doing chores around the house, with this activity she does not have any claudication only leg fatigue.          CARDIAC PROBLEM LIST:  Primary Cardiologist: Dr. Gallardo      I. CARDIAC  1. Permanent atrial fibrillation  A. On anticoagulation    2. Statin intolerance      II. VASCULAR  1. Carotid artery stenosis  A. Left internal carotid artery stent  B. Right internal carotid artery occlusion    2. PAD  A. Left common iliac stent, left distal SFA DCB (July 2019)  B. Right mid and distal DCB (September 2019)      IV. OTHER  1. Dyslipidemia    Last updated: Addy Cox PA-C 5/1/20     Review of Systems:  The following system(s) were reviewed and negative.  Pertinent positive and negative findings are noted in the HPI.    [x]                     Const                                   [x]                      Eyes   [x]                     ENT                                     [x]                      Resp                                       [x]                     CV                                       [x]                      GI   [x]                                                             [x]                      Neuro   [x]                     Musc                                    [x]                      Skin   [x]                     Psych                                  [x]                      Endo   [x]                     Allergy                                 [x]                      Heme/Lymph.    Histories:  Past Medical History:   Diagnosis Date   • A-fib (CMS/HCC) ([facility])    • A-fib (CMS/HCC) ([facility])    • Cardiovascular disease    • Complication of anesthesia    • Delayed emergence from general anesthesia    • DVT (deep venous thrombosis) (CMS/HCC) ([facility])    • Dysrhythmia     Afib   • GERD (gastroesophageal reflux disease) 5/2/2020   • Hypercholesteremia    • Scoliosis    • TIA (transient ischemic attack)    • Wears dentures     upper   • Wears partial dentures     lower     Past Surgical History:   Procedure Laterality Date   • AIFF ANGIO Bilateral 7/24/2019    Procedure: Aiff Angio;  Surgeon: Kailash Gallardo MD;  Location: [facility];  Service: Peripheral Vascular;  Laterality: Bilateral;  Late case #1 at 0830   • AIFF ANGIO Right 9/24/2019    Procedure: Aiff Angio staged;  Surgeon: Kailash Gallardo MD;  Location: [facility];  Service: Peripheral Vascular;  Laterality: Right;   • ATHERECTOMY - PERIPHERAL N/A 7/24/2019    Procedure: Atherectomy - peripheral;  Surgeon: Kailash Gallardo MD;  Location: [facility];  Service: Peripheral Vascular;  Laterality: N/A;   • ATHERECTOMY - PERIPHERAL N/A 9/24/2019    Procedure: Atherectomy - peripheral;  Surgeon: Kailash Gallardo MD;  Location: [facility];  Service: Peripheral Vascular;  Laterality: N/A;   • CAROTID STENT     • PERIPHERAL ARTERIAL STENT GRAFT N/A 7/24/2019    Procedure: Peripheral artery stenting;  Surgeon: Kailash Gallardo MD;  Location: [facility];  Service: Peripheral Vascular;  Laterality: N/A;   • TUBAL LIGATION       Family History    Problem Relation Age of Onset   • Other Mother         Complications of TB and emphysema, Cause of Death   • Blood Clots Father         Cause of Death   • Other Sister         Carotid endarterectomy   • Other Brother         Carotid endarterectomy     Social History     Occupational History   • Not on file   Tobacco Use   • Smoking status: Former Smoker     Packs/day: 0.50     Years: 10.00     Pack years: 5.00     Types: Cigarettes     Last attempt to quit: 2017     Years since quitting: 3.4   • Smokeless tobacco: Never Used   Substance and Sexual Activity   • Alcohol use: No   • Drug use: No   • Sexual activity: Defer   Social History Narrative   • Not on file     Social History     Tobacco Use   Smoking Status Former Smoker   • Packs/day: 0.50   • Years: 10.00   • Pack years: 5.00   • Types: Cigarettes   • Last attempt to quit: 2017   • Years since quitting: 3.4   Smokeless Tobacco Never Used     Social History     Substance and Sexual Activity   Drug Use No       Medications:   Current Outpatient Medications   Medication Sig Dispense Refill   • pantoprazole (PROTONIX) 40 mg EC tablet Take 40 mg by mouth 2 (two) times a day     • alirocumab (Praluent Pen) 75 mg/mL pen injector Inject 75 mg under the skin every 14 (fourteen) days 6 pen 3   • aspirin 81 mg EC tablet Take 1 tablet (81 mg total) by mouth daily 90 tablet 3   • vit A/vit C/vit E/zinc/copper (PRESERVISION AREDS ORAL) Take by mouth     • diltiazem CD (CARDIZEM CD) 180 mg 24 hr capsule Take 1 capsule (180 mg total) by mouth daily. 90 capsule 3   • Lactobacillus acidophilus (PROBIOTIC) 10 billion cell capsule Take 1 capsule by mouth daily       • apixaban (ELIQUIS) 5 mg tablet Take 5 mg by mouth 2 (two) times a day         No current facility-administered medications for this visit.        Allergies:  Allergies   Allergen Reactions   • Amoxicillin Itching   • Metronidazole Hives   • Sulfa (Sulfonamide Antibiotics) Itching     I have reviewed and confirmed  Belkis Burris's   allergies this visit.     Objective:    Data Review:   Sodium   Date Value Ref Range Status   05/22/2020 137 mmol/L Final   09/24/2019 139 135 - 145 mmol/L Final     Potassium   Date Value Ref Range Status   05/22/2020 4.5 mmol/L Final   09/24/2019 4.1 3.5 - 5.1 mmol/L Final     Chloride   Date Value Ref Range Status   05/22/2020 104 mmol/L Final   09/24/2019 104 98 - 107 mmol/L Final     CO2   Date Value Ref Range Status   05/22/2020 25 mmol/L Final   09/24/2019 25 21 - 32 mmol/L Final     BUN   Date Value Ref Range Status   05/22/2020 14 mg/dL Final   09/24/2019 15 7 - 25 mg/dL Final     Creatinine   Date Value Ref Range Status   05/22/2020 0.81 mg/dL Final   09/24/2019 0.85 0.60 - 1.20 mg/dL Final     Glucose   Date Value Ref Range Status   05/22/2020 113 mg/dL Final   09/24/2019 108 (H) 70 - 105 mg/dL Final     Calcium   Date Value Ref Range Status   05/22/2020 9.3 mg/dL Final   09/24/2019 9.6 8.6 - 10.3 mg/dL Final     Total CK   Date Value Ref Range Status   05/27/2017 32 26 - 192 IU/L      Troponin I   Date Value Ref Range Status   03/11/2019 <0.030 <0.030 ng/mL Final     BNP   Date Value Ref Range Status   05/30/2017 543 (H) 0 - 99 pg/mL      Lipids:    Lab Results   Component Value Date    CHOL 108 05/22/2020    CHOL 285 (A) 11/07/2019    CHOL 279 01/08/2019     Lab Results   Component Value Date    HDL 39 05/22/2020    HDL 42 11/07/2019    HDL 46 01/08/2019     Lab Results   Component Value Date    LDLCALC 55 05/22/2020    LDLCALC 206 01/08/2019    LDLCALC 179 12/11/2018     Lab Results   Component Value Date    TRIG 72 05/22/2020    TRIG 131 11/07/2019    TRIG 137 01/08/2019        TSH:   Lab Results   Component Value Date    TSH 0.740 06/24/2016     Magnesium:   Lab Results   Component Value Date    MG 1.9 03/12/2019     Protime-INR:   Lab Results   Component Value Date    PT 16.1 (H) 07/07/2017    INR 1.3 (H) 07/07/2017     A1c: No results found for: HGBA1C    There are no Patient  Instructions on file for this visit.         Next follow up in 1 year with myself.    Sincerely,    Leo eRece, DO  06/08/20

## 2020-06-30 ENCOUNTER — TELEPHONE - BILLABLE (OUTPATIENT)
Dept: CARDIOLOGY | Facility: CLINIC | Age: 80
End: 2020-06-30
Payer: MEDICARE

## 2020-06-30 DIAGNOSIS — I73.9 PAD (PERIPHERAL ARTERY DISEASE) (CMS/HCC): Primary | ICD-10-CM

## 2020-06-30 PROCEDURE — 99442 *INACTIVE DO NOT USE* PR PHYS/QHP TELEPHONE EVALUATION 11-20 MIN: CPT | Mod: 95 | Performed by: PHYSICIAN ASSISTANT

## 2020-06-30 RX ORDER — FERROUS SULFATE 325(65) MG
325 TABLET ORAL
COMMUNITY
End: 2020-09-25 | Stop reason: ALTCHOICE

## 2020-06-30 ASSESSMENT — ENCOUNTER SYMPTOMS: SHORTNESS OF BREATH: 0

## 2020-06-30 NOTE — PROGRESS NOTES
[facility] [place]  [facility] [address] [place] [id number]  [phone]      Cardiology Outpatient Telephone- Billable Progress Note      Subjective      Patient ID: Blekis uBrris is a 79 y.o. female.    Chief Complaint:   Chief Complaint   Patient presents with   • Atrial Fibrillation   • PAD- Lower   • Carotid Artery Stenosis   .    Per discussion with WILDER WAYNE, Belkis, has verbally consented to be treated via a telephone based visit: Yes. A total of 14 minutes were required for this telephone based visit.   Patient Location: Home  Provider Location: Clinic  Technology used by Provider: Phone       HPI    Patient is an 79-year-old female with a history as outlined below under specialty comments.    I last saw her in the office back in May 2020.  She herself was denying lower extremity discomfort or weakness.  However her daughter later called and she was having significant weakness which she feels is gradually improving.  It started right after her Lisa-Starks tear.  Recommended conservative therapy and reassess in about 3 months time to see if she had any improvement in her symptoms.    Patient presents back for follow-up for lower extremity peripheral arterial disease.  She has been doing well since we last spoke.  She feels that the weakness in her lower extremities has improved.  Overall she feels that her feet fatigue is also dramatically improved.  Her last hemoglobin was in the nines and she has been on iron therapy with a dramatic improvement she feels since that time.  She not had a repeat CBC it sounds like for some time now.    She is not doing her regular exercise regimen that she was doing in [place].  When asked why she just feels that she is not enough time to get to it.  However she does not relate that this is due to any limitations such as weakness, discomfort of the lower extremity.        Primary Cardiologist: Dr. Paulina SAENZ. CARDIAC  1. Permanent  atrial fibrillation  A. On anticoagulation    2. Statin intolerance      II. VASCULAR  1. Carotid artery stenosis  A. Left internal carotid artery stent  B. Right internal carotid artery occlusion    2. PAD  A. Left common iliac stent, left distal SFA DCB (July 2019)  B. Right mid and distal DCB (September 2019)      IV. OTHER  1. Dyslipidemia    Last updated: Addy Cox PA-C 5/1/20     Past Medical History:   Diagnosis Date   • A-fib (CMS[facility]) ([facility])    • A-fib (CMS/[facility]) ([facility])    • Cardiovascular disease    • Complication of anesthesia    • Delayed emergence from general anesthesia    • DVT (deep venous thrombosis) (CMS/[facility]) ([facility])    • Dysrhythmia     Afib   • GERD (gastroesophageal reflux disease) 5/2/2020   • Hypercholesteremia    • Scoliosis    • TIA (transient ischemic attack)    • Wears dentures     upper   • Wears partial dentures     lower       Past Surgical History:   Procedure Laterality Date   • AIFF ANGIO Bilateral 7/24/2019    Procedure: Aiff Angio;  Surgeon: Kailash Gallardo MD;  Location: [facility];  Service: Peripheral Vascular;  Laterality: Bilateral;  Late case #1 at 0830   • AIFF ANGIO Right 9/24/2019    Procedure: Aiff Angio staged;  Surgeon: Kailash Gallardo MD;  Location: [facility];  Service: Peripheral Vascular;  Laterality: Right;   • ATHERECTOMY - PERIPHERAL N/A 7/24/2019    Procedure: Atherectomy - peripheral;  Surgeon: Kailash Gallardo MD;  Location: [facility];  Service: Peripheral Vascular;  Laterality: N/A;   • ATHERECTOMY - PERIPHERAL N/A 9/24/2019    Procedure: Atherectomy - peripheral;  Surgeon: Kailash Gallardo MD;  Location: [facility];  Service: Peripheral Vascular;  Laterality: N/A;   • CAROTID STENT     • PERIPHERAL ARTERIAL STENT GRAFT N/A 7/24/2019    Procedure: Peripheral artery stenting;  Surgeon: Kailash Gallardo MD;  Location: [facility];  Service: Peripheral Vascular;  Laterality: N/A;   • TUBAL LIGATION         Allergies as of  06/30/2020 - Reviewed 10/01/2019   Allergen Reaction Noted   • Amoxicillin Itching 05/26/2017   • Metronidazole Hives 05/26/2017   • Sulfa (sulfonamide antibiotics) Itching 05/26/2017       Current Outpatient Medications   Medication Sig Dispense Refill   • ferrous sulfate 325 mg (65 mg iron) tablet Take 325 mg by mouth daily with breakfast     • pantoprazole (PROTONIX) 40 mg EC tablet Take 40 mg by mouth 2 (two) times a day     • alirocumab (Praluent Pen) 75 mg/mL pen injector Inject 75 mg under the skin every 14 (fourteen) days 6 pen 3   • aspirin 81 mg EC tablet Take 1 tablet (81 mg total) by mouth daily 90 tablet 3   • diltiazem CD (CARDIZEM CD) 180 mg 24 hr capsule Take 1 capsule (180 mg total) by mouth daily. 90 capsule 3   • Lactobacillus acidophilus (PROBIOTIC) 10 billion cell capsule Take 1 capsule by mouth daily       • apixaban (ELIQUIS) 5 mg tablet Take 5 mg by mouth 2 (two) times a day       • vit A/vit C/vit E/zinc/copper (PRESERVISION AREDS ORAL) Take by mouth       No current facility-administered medications for this visit.        Family History   Problem Relation Age of Onset   • Other Mother         Complications of TB and emphysema, Cause of Death   • Blood Clots Father         Cause of Death   • Other Sister         Carotid endarterectomy   • Other Brother         Carotid endarterectomy       Social History     Tobacco Use   • Smoking status: Former Smoker     Packs/day: 0.50     Years: 10.00     Pack years: 5.00     Types: Cigarettes     Last attempt to quit: 2017     Years since quitting: 3.4   • Smokeless tobacco: Never Used   Substance Use Topics   • Alcohol use: No   • Drug use: No         Review of Systems  Review of Systems   Cardiovascular: Negative for chest pain and leg swelling/pain.   Respiratory: Negative for shortness of breath.    Hematologic/Lymphatic: Negative for major bleeding.   Gastrointestinal: Negative for melena.     10 point review of systems performed. Positives listed  and all others negative.     Objective     VITALS:  Deferred due to Telemedicine Visit    PHYSICAL EXAM:  Deferred due to Telemedicine Visit      Lipid:   Total cholesterol   Date Value Ref Range Status   11/07/2019 285 (A) 200 mg/dL Final     Cholesterol   Date Value Ref Range Status   05/22/2020 108 mg/dL Final     HDL   Date Value Ref Range Status   05/22/2020 39 mg/dL Final   11/07/2019 42 40 - 60 mg/dL Final     Triglycerides   Date Value Ref Range Status   05/22/2020 72 mg/dL Final   11/07/2019 131 150 mg/dL Final     LDL Calculated   Date Value Ref Range Status   05/22/2020 55 mg/dL Final   05/28/2017 96 <100 mg/dL          Assessment/Plan   Diagnoses and all orders for this visit:    PAD (peripheral artery disease) (CMS/HCC) (HCC)  Right distal SFA shows severe stenosis.  Three-vessel runoff to the foot.  EL 0.7.  The left side is patent.    Again she was doing great in the past when she was living in [place] until she had a Lisa-Starks tear with a significant drop in her hemoglobin.  She was not having any claudication symptoms of either leg exercising for about 40-45 minutes without symptoms.    Her weakness of the lower extremity has improved most notably on the right side.  I did educate her about the signs and symptoms of claudication and critical limb ischemia.  At this point she does not want to proceed with any further intervention which I think is reasonable as she sounds that she is relatively asymptomatic.  However I did encourage her to adopt a regular exercise regimen again as a barometer to assess her symptoms but also for neovascularization of her severe stenosis.  We will continue with current medical therapy.  She will follow-up in our office in late October/early November before she leaves for the winter in [place] again to reassess her symptoms.          WILDER WAYNE  6/30/2020      A voice recognition program was used to aid in documentation of this record. Sometimes words are  not presented exactly as they were spoken.  While efforts were made to carefully edit and correct any inaccuracies, some errors may be present.  Please take this into context.  Please contact the provider if errors are identified.

## 2020-07-07 ENCOUNTER — CLINICAL SUPPORT (OUTPATIENT)
Dept: RESEARCH | Facility: OTHER | Age: 80
End: 2020-07-07
Payer: MEDICARE

## 2020-07-07 DIAGNOSIS — Z00.6 RESEARCH SUBJECT: ICD-10-CM

## 2020-07-07 PROCEDURE — 3700 RSCH CLEAR OFF IP SCHEDULED PHONE CALL: Mod: RPD | Performed by: NURSE PRACTITIONER

## 2020-07-07 NOTE — RESEARCH NOTE
Active Trial: CLEAR    Name: Belkis Burris  : 1940  Age:  79 y.o.  Sex: female    Appt. Location: [facility] CLINICAL RESEARCH  [address] [place] [id number] [phone]      Trial Information:   Visit Summary: Patient was contacted today per phone call to complete her Visit T8/M18 for the CLEAR Trial.  She reported doing well today.  Belkis reported being admitted to  the hospital in [place] while visiting family for upper GI bleed and anemia.  She was also found to be in Atrial Fibrillation. She has not been noncompliant with her Cardizem secondary to fatigue.  An EGD was scheduled and a large non-bleeding vladimir-Starks tear with stigmata was found.  Normal stomach, normal duodenum, no active bleedig at the time of the EGD.  She was instructed to hold Eliquis and Aspirin in setting of suspected UGI bleed. She states she has not ahd any problems since this incident.    Medications were reviewed and reconciled.  Adverse events were reviewed and reconciled.      Belkis is off study drug and will continue to follow up with phone visits as scheduled. She was counseled on her diet and exercise, she follows a heart healthy diet as recommended.    I discussed the re-consent  [id number]  and the Privacy Notice [id number] with Belkis and all her questions were answered. I will mail it out to her and she agreed to return the signed consent.    Patient was thanked for her participation in research and encouraged her to call with any questions or concerns.      Study Drug Compliance: N/A  Patient is compliant with Study drug?  Events:    Adverse Events: None reported     Serious Adverse Events: Hospitalization May 2 ,2020-May 4, 2020 UGI Bleed/Anemia    Outcome Events: None reported    Labs:  NO LABS DRAWN

## 2020-08-12 ENCOUNTER — CLINICAL SUPPORT (OUTPATIENT)
Dept: RESEARCH | Facility: OTHER | Age: 80
End: 2020-08-12
Payer: MEDICARE

## 2020-08-12 DIAGNOSIS — Z00.6 RESEARCH SUBJECT: Primary | ICD-10-CM

## 2020-08-12 NOTE — RESEARCH NOTE
"Active Trial:     Name: Yuan Burris  : 1940  Age:  79 y.o.  Sex: female    Appt. Location: [facility] CLINICAL RESEARCH  [address] [place] [id number] [phone]      Patient was contacted today regarding her re-consent she had returned to the office a unsigned  Re-consent [id number] and [id number] for the Clear Trial with a note attached saying \"I will not sign this\". I discussed the re-consent once more with yuan as I had discussed this in July with her and she agreed to sign it and mail it back. She was very adamant that she did not want to be a part of the research trial. She did say we could just review her records in Epic, but would not sign anything further.  I honored her wishes and told her we would just follow her with medical review and make a note that she verbally agreed to this. I notified our Clear Monitor regarding this matter.           "

## 2020-09-10 ENCOUNTER — TELEPHONE (OUTPATIENT)
Dept: CARDIOLOGY | Facility: CLINIC | Age: 80
End: 2020-09-10

## 2020-09-10 NOTE — TELEPHONE ENCOUNTER
PT's daughter Bee called and wanted to let us know that her mother is moving to [place] in October and wanted to get a f/u w/[facility] before she moves.    She also wanted to know if  has any recommendations for a cardiologist that would be near her new home in [place].

## 2020-09-11 ENCOUNTER — TELEPHONE (OUTPATIENT)
Dept: CARDIOLOGY | Facility: CLINIC | Age: 80
End: 2020-09-11

## 2020-09-11 NOTE — TELEPHONE ENCOUNTER
Belkis is wondering if she needs testing before she moves to [place]. Will review with Addy and call her back.

## 2020-09-11 NOTE — TELEPHONE ENCOUNTER
Called Belkis to let her know Addy said as long as she did not have any problems she did not need updated testing. She stated she has been feeling great and wants to wait until she comes back for an appointment. He has no specific recommendations for who she should see in [place]. I let her know this. She had no questions.

## 2020-09-20 ENCOUNTER — HOME MONITORING (OUTPATIENT)
Dept: FAMILY MEDICINE | Facility: CLINIC | Age: 80
End: 2020-09-20

## 2020-09-20 ENCOUNTER — HOSPITAL ENCOUNTER (EMERGENCY)
Facility: HOSPITAL | Age: 80
Discharge: HOME | End: 2020-09-20
Attending: EMERGENCY MEDICINE
Payer: MEDICARE

## 2020-09-20 ENCOUNTER — APPOINTMENT (OUTPATIENT)
Dept: RADIOLOGY | Facility: HOSPITAL | Age: 80
End: 2020-09-20
Payer: MEDICARE

## 2020-09-20 ENCOUNTER — OFFICE VISIT (OUTPATIENT)
Dept: URGENT CARE | Facility: URGENT CARE | Age: 80
End: 2020-09-20
Payer: MEDICARE

## 2020-09-20 VITALS
HEIGHT: 61 IN | OXYGEN SATURATION: 95 % | BODY MASS INDEX: 32.34 KG/M2 | HEART RATE: 81 BPM | SYSTOLIC BLOOD PRESSURE: 127 MMHG | WEIGHT: 171.3 LBS | TEMPERATURE: 99.1 F | DIASTOLIC BLOOD PRESSURE: 80 MMHG | RESPIRATION RATE: 23 BRPM

## 2020-09-20 VITALS
DIASTOLIC BLOOD PRESSURE: 56 MMHG | HEIGHT: 61 IN | HEART RATE: 85 BPM | TEMPERATURE: 98 F | OXYGEN SATURATION: 93 % | RESPIRATION RATE: 16 BRPM | SYSTOLIC BLOOD PRESSURE: 72 MMHG | WEIGHT: 166 LBS | BODY MASS INDEX: 31.34 KG/M2

## 2020-09-20 DIAGNOSIS — R53.1 WEAKNESS: Primary | ICD-10-CM

## 2020-09-20 DIAGNOSIS — I95.9 HYPOTENSION, UNSPECIFIED HYPOTENSION TYPE: ICD-10-CM

## 2020-09-20 DIAGNOSIS — U07.1 COVID-19: Primary | ICD-10-CM

## 2020-09-20 LAB
ALBUMIN SERPL-MCNC: 3.7 G/DL (ref 3.5–5.3)
ALP SERPL-CCNC: 66 U/L (ref 55–142)
ALT SERPL-CCNC: 31 U/L (ref 7–52)
ANION GAP SERPL CALC-SCNC: 8 MMOL/L (ref 3–11)
AST SERPL-CCNC: 26 U/L
BACTERIA #/AREA URNS AUTO: ABNORMAL /HPF
BASOPHILS # BLD AUTO: 0 10*3/UL
BASOPHILS NFR BLD AUTO: 1 % (ref 0–2)
BILIRUB SERPL-MCNC: 0.79 MG/DL (ref 0.2–1.4)
BILIRUB UR QL STRIP.AUTO: NEGATIVE
BUN SERPL-MCNC: 13 MG/DL (ref 7–25)
CALCIUM ALBUM COR SERPL-MCNC: 9.2 MG/DL (ref 8.6–10.3)
CALCIUM SERPL-MCNC: 9 MG/DL (ref 8.6–10.3)
CHLORIDE SERPL-SCNC: 99 MMOL/L (ref 98–107)
CLARITY UR: CLEAR
CO2 SERPL-SCNC: 26 MMOL/L (ref 21–32)
COLOR UR: YELLOW
CREAT SERPL-MCNC: 0.81 MG/DL (ref 0.6–1.1)
EOSINOPHIL # BLD AUTO: 0 10*3/UL
EOSINOPHIL NFR BLD AUTO: 0 % (ref 0–3)
ERYTHROCYTE [DISTWIDTH] IN BLOOD BY AUTOMATED COUNT: 14.7 % (ref 11.5–14)
GFR SERPL CREATININE-BSD FRML MDRD: 69 ML/MIN/1.73M*2
GLUCOSE SERPL-MCNC: 119 MG/DL (ref 70–105)
GLUCOSE UR STRIP.AUTO-MCNC: NEGATIVE MG/DL
HCT VFR BLD AUTO: 42.6 % (ref 34–45)
HGB BLD-MCNC: 14.7 G/DL (ref 11.5–15.5)
HGB UR QL STRIP.AUTO: NEGATIVE
KETONES UR STRIP.AUTO-MCNC: NEGATIVE MG/DL
LACTATE BLDV-SCNC: 1.37 MMOL/L (ref 0.5–1.99)
LEUKOCYTE ESTERASE UR QL STRIP: ABNORMAL
LYMPHOCYTES # BLD AUTO: 0.8 10*3/UL
LYMPHOCYTES NFR BLD AUTO: 14 % (ref 11–47)
MCH RBC QN AUTO: 28.7 PG (ref 28–33)
MCHC RBC AUTO-ENTMCNC: 34.5 G/DL (ref 32–36)
MCV RBC AUTO: 83.2 FL (ref 81–97)
MONOCYTES # BLD AUTO: 0.6 10*3/UL
MONOCYTES NFR BLD AUTO: 10 % (ref 3–11)
MUCOUS THREADS #/AREA URNS HPF: PRESENT /[HPF]
NEUTROPHILS # BLD AUTO: 4.4 10*3/UL
NEUTROPHILS NFR BLD AUTO: 75 % (ref 41–81)
NITRITE UR QL STRIP.AUTO: NEGATIVE
PH UR STRIP.AUTO: 6 PH
PLATELET # BLD AUTO: 186 10*3/UL (ref 140–350)
PMV BLD AUTO: 7.6 FL (ref 6.9–10.8)
POTASSIUM SERPL-SCNC: 4.2 MMOL/L (ref 3.5–5.1)
PROT SERPL-MCNC: 6.7 G/DL (ref 6–8.3)
PROT UR STRIP.AUTO-MCNC: NEGATIVE MG/DL
RBC # BLD AUTO: 5.12 10*6/ΜL (ref 3.7–5.3)
RBC #/AREA URNS AUTO: ABNORMAL /HPF
SARS-COV-2 RNA RESP QL NAA+PROBE: POSITIVE
SODIUM SERPL-SCNC: 133 MMOL/L (ref 135–145)
SP GR UR STRIP.AUTO: 1.01 (ref 1–1.03)
SQUAMOUS #/AREA URNS AUTO: ABNORMAL /HPF
TROPONIN I SERPL-MCNC: 6.4 PG/ML
UROBILINOGEN UR STRIP.AUTO-MCNC: 2 E.U./DL
WBC # BLD AUTO: 5.8 10*3/UL (ref 4.5–10.5)
WBC #/AREA URNS AUTO: ABNORMAL /HPF

## 2020-09-20 PROCEDURE — G0463 HOSPITAL OUTPT CLINIC VISIT: HCPCS | Mod: NCNR,PO | Performed by: PHYSICIAN ASSISTANT

## 2020-09-20 PROCEDURE — 85025 COMPLETE CBC W/AUTO DIFF WBC: CPT | Performed by: EMERGENCY MEDICINE

## 2020-09-20 PROCEDURE — 2580000300 HC RX 258: Performed by: EMERGENCY MEDICINE

## 2020-09-20 PROCEDURE — 36415 COLL VENOUS BLD VENIPUNCTURE: CPT | Performed by: EMERGENCY MEDICINE

## 2020-09-20 PROCEDURE — 84484 ASSAY OF TROPONIN QUANT: CPT | Performed by: EMERGENCY MEDICINE

## 2020-09-20 PROCEDURE — C9803 HOPD COVID-19 SPEC COLLECT: HCPCS | Mod: CS | Performed by: EMERGENCY MEDICINE

## 2020-09-20 PROCEDURE — 83605 ASSAY OF LACTIC ACID: CPT | Performed by: EMERGENCY MEDICINE

## 2020-09-20 PROCEDURE — 80053 COMPREHEN METABOLIC PANEL: CPT | Performed by: EMERGENCY MEDICINE

## 2020-09-20 PROCEDURE — 71045 X-RAY EXAM CHEST 1 VIEW: CPT

## 2020-09-20 PROCEDURE — 99213 OFFICE O/P EST LOW 20 MIN: CPT | Mod: CS,25 | Performed by: PHYSICIAN ASSISTANT

## 2020-09-20 PROCEDURE — 99285 EMERGENCY DEPT VISIT HI MDM: CPT | Performed by: EMERGENCY MEDICINE

## 2020-09-20 PROCEDURE — 81001 URINALYSIS AUTO W/SCOPE: CPT | Performed by: EMERGENCY MEDICINE

## 2020-09-20 PROCEDURE — 87635 SARS-COV-2 COVID-19 AMP PRB: CPT | Performed by: EMERGENCY MEDICINE

## 2020-09-20 PROCEDURE — 93005 ELECTROCARDIOGRAM TRACING: CPT

## 2020-09-20 RX ORDER — SODIUM CHLORIDE 9 MG/ML
1000 INJECTION, SOLUTION INTRAVENOUS ONCE
Status: DISCONTINUED | OUTPATIENT
Start: 2020-09-20 | End: 2020-09-20 | Stop reason: HOSPADM

## 2020-09-20 RX ORDER — SODIUM CHLORIDE 9 MG/ML
1000 INJECTION, SOLUTION INTRAVENOUS ONCE
Status: COMPLETED | OUTPATIENT
Start: 2020-09-20 | End: 2020-09-20

## 2020-09-20 RX ORDER — ACETAMINOPHEN 500 MG
1000 TABLET ORAL ONCE
Status: COMPLETED | OUTPATIENT
Start: 2020-09-20 | End: 2020-09-20

## 2020-09-20 RX ORDER — DEXLANSOPRAZOLE 30 MG/1
30 CAPSULE, DELAYED RELEASE ORAL DAILY
COMMUNITY

## 2020-09-20 RX ORDER — SODIUM CHLORIDE 0.9 % (FLUSH) 0.9 %
3 SYRINGE (ML) INJECTION AS NEEDED
Status: DISCONTINUED | OUTPATIENT
Start: 2020-09-20 | End: 2020-09-20 | Stop reason: HOSPADM

## 2020-09-20 RX ADMIN — SODIUM CHLORIDE 1000 ML: 9 INJECTION, SOLUTION INTRAVENOUS at 12:17

## 2020-09-20 RX ADMIN — Medication 1000 MG: at 12:43

## 2020-09-20 ASSESSMENT — PAIN SCALES - GENERAL: PAINLEVEL: 0-NO PAIN

## 2020-09-20 NOTE — ED PROVIDER NOTES
HPI:  Chief Complaint   Patient presents with   • Weakness - Generalized     Came from urgent care, arrived via [facility] M1. Daughter reports patient was loss of appetitie, fatigue and HA in last week. Over last 2 days weak and fatigued with normal ADLs. Has history of anemia and ran out of iron tabs at home. Denies loss of blood.  en route.    • Fatigue     HPI   79-year-old female brought to the emergency department a one-week history of gradually increasing weakness.  About a week ago patient had some general myalgia and weakness and fatigue but her daughter states she rallied for a few days and then began to have cough.  Patient's other daughter also had similar symptoms to the patient's presentation about a week ago.  Patient has not been having cough or the last several days and reports loss of appetite and fatigue.  She is had a mild headache as well.  Today she went to urgent care and while sitting and being evaluated she had a syncopal spell where her blood pressure was noted to be in the 60s systolic and she was laid supine and given oxygen and recovered.  Patient denies chest pain.  She denies nausea vomiting.  No diarrhea.  No exposure to Covid per her knowledge.    HISTORY:  Past Medical History:   Diagnosis Date   • A-fib (CMS/HCC) (HCC)    • A-fib (CMS/HCC) (HCC)    • Cardiovascular disease    • Complication of anesthesia    • Delayed emergence from general anesthesia    • DVT (deep venous thrombosis) (CMS/HCC) (HCC)    • Dysrhythmia     Afib   • GERD (gastroesophageal reflux disease) 5/2/2020   • Hypercholesteremia    • Scoliosis    • TIA (transient ischemic attack)    • Wears dentures     upper   • Wears partial dentures     lower       Past Surgical History:   Procedure Laterality Date   • AIFF ANGIO Bilateral 7/24/2019    Procedure: Aiff Angio;  Surgeon: Kailash Gallardo MD;  Location: [facility] Cath Lab;  Service: Peripheral Vascular;  Laterality: Bilateral;  Late case #1 at 0830   • AIFF ANGIO  Right 9/24/2019    Procedure: Aiff Angio staged;  Surgeon: Kailash Gallardo MD;  Location: [facility] Cath Lab;  Service: Peripheral Vascular;  Laterality: Right;   • ATHERECTOMY - PERIPHERAL N/A 7/24/2019    Procedure: Atherectomy - peripheral;  Surgeon: Kailash Gallardo MD;  Location: [facility] Cath Lab;  Service: Peripheral Vascular;  Laterality: N/A;   • ATHERECTOMY - PERIPHERAL N/A 9/24/2019    Procedure: Atherectomy - peripheral;  Surgeon: Kailash Gallardo MD;  Location: [facility] Cath Lab;  Service: Peripheral Vascular;  Laterality: N/A;   • CAROTID STENT     • PERIPHERAL ARTERIAL STENT GRAFT N/A 7/24/2019    Procedure: Peripheral artery stenting;  Surgeon: Kailash Gallardo MD;  Location: [facility] Cath Lab;  Service: Peripheral Vascular;  Laterality: N/A;   • TUBAL LIGATION         Family History   Problem Relation Age of Onset   • Other Mother         Complications of TB and emphysema, Cause of Death   • Blood Clots Father         Cause of Death   • Other Sister         Carotid endarterectomy   • Other Brother         Carotid endarterectomy       Social History     Tobacco Use   • Smoking status: Former Smoker     Packs/day: 0.50     Years: 10.00     Pack years: 5.00     Types: Cigarettes     Quit date: 2017     Years since quitting: 3.7   • Smokeless tobacco: Never Used   Substance Use Topics   • Alcohol use: No   • Drug use: No       ROS:  Constitutional: negative for fever.  General weakness  Eyes: negative for eye pain  ENT: negative for sore throat  Cardiovascular: negative for chest pain  Respiratory:  cough  GI: negative for abdominal pain  : negative for hematuria  Musculoskeletal: negative for back pain  Neuro:  headache  Hematology: negative for bleeding  Skin: negative for rash    PHYSICAL EXAM:  ED Triage Vitals   Temp Heart Rate Resp BP SpO2   09/20/20 1134 09/20/20 1134 09/20/20 1134 09/20/20 1134 09/20/20 1134   37.3 °C (99.1 °F) 95 22 105/60 93 %      Temp Source Heart Rate Source Patient Position  BP Location FiO2 (%)   09/20/20 1134 -- 09/20/20 1145 -- --   Oral  Head of bed 30 degrees or higher       Nursing note and vitals reviewed.  Constitutional: appears well-developed.   Head: Normocephalic and atraumatic.   Eyes: Conjunctiva normal.  Neck: Supple  Cardiovascular: Regular rate and rhythm  Pulmonary/Chest: No respiratory distress.  Clear to auscultation bilaterally.  Abdominal: Soft and nontender.  Normal bowel sounds.  Back: Non-tender.  Musculoskeletal: No edema  Neurological: Alert. No focal deficits.  Skin: Skin is warm and dry. No rash noted.   Psychiatric: Flat affect.    MDM: Patient's exam is unremarkable.  She does look generally unwell.  Her blood pressure here is in the low 100s systolic.  Lactate is not elevated.  She is given IV fluids while labs are obtained.     Labs Reviewed   COVID-19 MOLECULAR (PCR) - Abnormal       Result Value    COVID-19 PCR Positive (*)     Narrative:     COVID-19 NP sample collection can be used for Respiratory Pathogen Panel testing.  Positive results are indicative of the presence of SARS-CoV-2 RNA; clinical correlation with patient history and other diagnostic information is necessary to determine patient infection status.    Performance of this SARS-CoV-2 RNA test has only been established in individuals suspected of having COVID-19 by their healthcare provider.    Testing was performed using the Xpert Xpress SARS-CoV-2 assay ([employer]) on the GeneXpert Dx system.  Fact sheets for this Emergency Use Authorization (EUA) assay can be found at the following links:  For Healthcare Providers  https://www.fda.gov/media/946888/download  For Patients  https://www.fda.gov/media/968439/download     COMPREHENSIVE METABOLIC PANEL - Abnormal    Sodium 133 (*)     Potassium 4.2      Chloride 99      CO2 26      Anion Gap 8      BUN 13      Creatinine 0.81      Glucose 119 (*)     Calcium 9.0      AST 26      ALT (SGPT) 31      Alkaline Phosphatase 66      Total Protein 6.7       Albumin 3.7      Total Bilirubin 0.79      eGFR 69      Corrected Calcium 9.2      Narrative:     Estimated GFR calculated using the 2009 CKD-EPI creatinine equation.   CBC WITH AUTO DIFFERENTIAL - Abnormal    WBC 5.8      RBC 5.12      Hemoglobin 14.7      Hematocrit 42.6      MCV 83.2      MCH 28.7      MCHC 34.5      RDW 14.7 (*)     Platelets 186      MPV 7.6      Neutrophils% 75      Lymphocytes% 14      Monocytes% 10      Eosinophils% 0      Basophils% 1      Neutrophils Absolute 4.40      Lymphocytes Absolute 0.80      Monocytes Absolute 0.60      Eosinophils Absolute 0.00      Basophils Absolute 0.00     URINALYSIS, MICROSCOPIC ONLY - Abnormal    RBC, Urine 3-5 (*)     WBC, Urine 5-9 (*)     Squamous Epithelial, Urine 10-14 (*)     Bacteria, Urine Few      Mucus, Urine Present     URINALYSIS, DIPSTICK ONLY, FOR USE WITH MICROSCOPIC PANEL - Abnormal    Color, Urine Yellow      Clarity, Urine Clear      Specific Gravity, Urine 1.011      Leukocytes, Urine Small (*)     Nitrite, Urine Negative      Protein, Urine Negative      Ketones, Urine Negative      Urobilinogen, Urine 2.0 (*)     Bilirubin, Urine Negative      Blood, Urine Negative      Glucose, Urine Negative      pH, Urine 6.0     POCT LACTIC ACID (LACTATE) VENOUS - Normal    POC Lactate 1.37     HIGH SENSITIVE TROPONIN I - Normal    hsTnI 0 Hour 6.4     POCT LACTIC ACID (LACTATE)    Narrative:     The following orders were created for panel order POCT lactic acid (lactate) Blood, Venous.  Procedure                               Abnormality         Status                     ---------                               -----------         ------                     POCT lactic acid (lactate...[[id number]]  Normal              Final result                 Please view results for these tests on the individual orders.   URINALYSIS WITH MICROSCOPIC    Narrative:     The following orders were created for panel order Urinalysis w/microscopic Urine, Clean  Catch.  Procedure                               Abnormality         Status                     ---------                               -----------         ------                     Urinalysis, microscopic U...[[id number]]  Abnormal            Final result               Urinalysis, dipstick Urin...[[id number]]  Abnormal            Final result                 Please view results for these tests on the individual orders.       XR chest portable 1 view   Final Result   IMPRESSION:   Minimal basal pulmonary atelectasis or infiltrate.             ED Medication Administration from 09/20/2020 1128 to 09/20/2020 1506       Date/Time Order Dose Route Action Action by     09/20/2020 1217 sodium chloride 0.9 % bolus 1,000 mL 1,000 mL intravenous New Bag/New Syringe HELADIO Andersen     09/20/2020 1317 sodium chloride 0.9 % bolus 1,000 mL 0 mL intravenous Stopped [facility]     09/20/2020 1243 acetaminophen (TYLENOL) tablet 1,000 mg 1,000 mg oral Given HELADIO Andersen          PROCEDURES:  ECG 12 lead - Shortness of breath    Date/Time: 9/20/2020 2:48 PM  Performed by: Ravin Lim MD  Authorized by: Ravin Lim MD     Comments:      Atrial fibrillation, rate 84, normal axis.  Low voltage precordial leads.  Unchanged as compared to 10/1/2019.        ED COURSE:  ED Course as of Sep 20 1507   Sun Sep 20, 2020   1330 Feeling better after receiving Tylenol.  Her blood pressure is well about low 100s systolic after about 800 mL's IV fluid.  We are still awaiting urine sample.  We may give additional IV fluid.  Like to see her blood pressure a little bit better before making decision about disposition.    [SM]   1447 Patient is having improvement of blood pressure after 1 L of IV fluid.  It was in the 120 systolic.  She is now provided urine sample.  We will give additional fluid while we are waiting the results of urinalysis.  I suspect will be able to discharge her home.  She is looking more perky and alert after IV fluid bolus and  Tylenol.  She states her headache is improved.  Family would like to go home.    [SM]   1448 UA is unremarkable.  Blood pressure is remaining good and is approaching 130 systolic.  Patient will be discharged with symptomatic care instructions for Covid to convalesce at home.  Indications to return are discussed.    [SM]      ED Course User Index  [SM] Ravin Lim MD       CLINICAL IMPRESSION:  Final diagnoses:   [U07.1] COVID-19   Hypotension-resolved  A. Fib      A voice recognition program was used to aid in documentation of this record.  Sometimes words are not printed exactly as they were spoken.  While efforts were made to carefully edit and correct any inaccuracies, some areas may be present; please take these into context.  Please contact the provider if areas are identified.       Ravin Lim MD  09/20/20 [id number]

## 2020-09-20 NOTE — PROGRESS NOTES
Clinic Note     SUBJECTIVE    Chief Complaint:  Chief Complaint   Patient presents with   • Cough     Pt presnts with c.o. feeling weak 2-3 days. Pt reports that in May she had an esophageal tear and needed a blood transfusion. Pt states she feels similar now. Pt also had cough, headache, aching last week that has improved somewhat.        HPI:    Belkis Burris is a 79 y.o. female who presents for Cough (Pt presnts with c.o. feeling weak 2-3 days. Pt reports that in May she had an esophageal tear and needed a blood transfusion. Pt states she feels similar now. Pt also had cough, headache, aching last week that has improved somewhat. )  .  She denies coffee-ground emesis or black stools.    The following have been reviewed and updated as appropriate in this visit:         Allergies   Allergen Reactions   • Amoxicillin Itching   • Metronidazole Hives   • Sulfa (Sulfonamide Antibiotics) Itching     Current Outpatient Medications   Medication Sig Dispense Refill   • dexlansoprazole (DEXILANT ORAL) Take by mouth     • pantoprazole (PROTONIX) 40 mg EC tablet Take 40 mg by mouth 2 (two) times a day     • alirocumab (Praluent Pen) 75 mg/mL pen injector Inject 75 mg under the skin every 14 (fourteen) days 6 pen 3   • aspirin 81 mg EC tablet Take 1 tablet (81 mg total) by mouth daily 90 tablet 3   • diltiazem CD (CARDIZEM CD) 180 mg 24 hr capsule Take 1 capsule (180 mg total) by mouth daily. 90 capsule 3   • Lactobacillus acidophilus (PROBIOTIC) 10 billion cell capsule Take 1 capsule by mouth daily       • apixaban (ELIQUIS) 5 mg tablet Take 5 mg by mouth 2 (two) times a day       • ferrous sulfate 325 mg (65 mg iron) tablet Take 325 mg by mouth daily with breakfast     • vit A/vit C/vit E/zinc/copper (PRESERVISION AREDS ORAL) Take by mouth       No current facility-administered medications for this visit.      Past Medical History:   Diagnosis Date   • A-fib (CMS/HCC) (HCC)    • A-fib (CMS/HCC) (HCC)    • Cardiovascular  disease    • Complication of anesthesia    • Delayed emergence from general anesthesia    • DVT (deep venous thrombosis) (CMS/HCC) (HCC)    • Dysrhythmia     Afib   • GERD (gastroesophageal reflux disease) 5/2/2020   • Hypercholesteremia    • Scoliosis    • TIA (transient ischemic attack)    • Wears dentures     upper   • Wears partial dentures     lower     Past Surgical History:   Procedure Laterality Date   • AIFF ANGIO Bilateral 7/24/2019    Procedure: Aiff Angio;  Surgeon: Kailash Gallardo MD;  Location: [facility] Cath Lab;  Service: Peripheral Vascular;  Laterality: Bilateral;  Late case #1 at 0830   • AIFF ANGIO Right 9/24/2019    Procedure: Aiff Angio staged;  Surgeon: Kailash Gallardo MD;  Location: [facility] Cath Lab;  Service: Peripheral Vascular;  Laterality: Right;   • ATHERECTOMY - PERIPHERAL N/A 7/24/2019    Procedure: Atherectomy - peripheral;  Surgeon: Kailash Gallardo MD;  Location: [facility] Cath Lab;  Service: Peripheral Vascular;  Laterality: N/A;   • ATHERECTOMY - PERIPHERAL N/A 9/24/2019    Procedure: Atherectomy - peripheral;  Surgeon: Kailash Gallardo MD;  Location: [facility] Cath Lab;  Service: Peripheral Vascular;  Laterality: N/A;   • CAROTID STENT     • PERIPHERAL ARTERIAL STENT GRAFT N/A 7/24/2019    Procedure: Peripheral artery stenting;  Surgeon: Kailash Gallardo MD;  Location: [facility] Cath Lab;  Service: Peripheral Vascular;  Laterality: N/A;   • TUBAL LIGATION       Family History   Problem Relation Age of Onset   • Other Mother         Complications of TB and emphysema, Cause of Death   • Blood Clots Father         Cause of Death   • Other Sister         Carotid endarterectomy   • Other Brother         Carotid endarterectomy     Social History     Occupational History   • Not on file   Tobacco Use   • Smoking status: Former Smoker     Packs/day: 0.50     Years: 10.00     Pack years: 5.00     Types: Cigarettes     Quit date: 2017     Years since quitting: 3.7   • Smokeless tobacco: Never  "Used   Substance and Sexual Activity   • Alcohol use: No   • Drug use: No   • Sexual activity: Defer   Social History Narrative   • Not on file       Review of Systems:  Review of Systems   As per HPI    OBJECTIVE     Vitals:  BP (!) 72/56 (BP Location: Left arm, Patient Position: Sitting, Cuff Size: Regular Adult) Comment: manual  Pulse 85   Temp 36.7 °C (98 °F) (Temporal)   Resp 16   Ht 1.549 m (5' 1\")   Wt 75.3 kg (166 lb)   SpO2 93%   BMI 31.37 kg/m²     PE:  Physical Exam  Vitals signs and nursing note reviewed.   Constitutional:       Appearance: Normal appearance. She is well-developed. She is ill-appearing.   HENT:      Head: Normocephalic and atraumatic.   Eyes:      Pupils: Pupils are equal, round, and reactive to light.   Neck:      Musculoskeletal: Neck supple.   Cardiovascular:      Rate and Rhythm: Normal rate. Rhythm irregular.   Pulmonary:      Effort: Pulmonary effort is normal.   Neurological:      Mental Status: She is alert.           No results found for this or any previous visit (from the past 12 hour(s)).      ASSESSMENT:    Belkis was seen today for cough.    Diagnoses and all orders for this visit:    Weakness    Hypotension, unspecified hypotension type          PLAN  Patient here with weakness and hypotension.  She became very short of breath and nauseous and felt like she was going to pass out and/or vomit.  EMS was called.  Daughter requested oxygen be placed on the patient.  O2 sat initially 93%, but then went to 95% with 2 L of O2 per nasal cannula.  IV access was gained for EMS and she was transferred to the ED per ambulance.  Transfer center was notified and Dr. Lim is the accepting physician at the ED.    WILDER Retana  "

## 2020-09-20 NOTE — DISCHARGE INSTRUCTIONS
Please return to the emergency department if you are having worse shortness of breath or fever or cough or any concern.  You need to stay home and isolate yourself from public and family until you are 7 days from the onset of your symptoms AND 3 days with no fever (and not requiring any medications).  Drink lots of fluids.  Take Tylenol 1000 mg every 6 hours as needed for pain or fever.  Please change position every hour to improve breathing.  We have found that lying on your right side and alternating with back or prone or left side helps.  All Covid-19 Nurse Call line ([phone]) with any questions or concerns.      Call your primary care provider to arrange probable telehealth follow-up to monitor your progression with course of Covid illness.

## 2020-09-25 ENCOUNTER — OFFICE VISIT (OUTPATIENT)
Dept: URGENT CARE | Facility: URGENT CARE | Age: 80
End: 2020-09-25
Payer: MEDICARE

## 2020-09-25 ENCOUNTER — TELEPHONE (OUTPATIENT)
Dept: URGENT CARE | Facility: URGENT CARE | Age: 80
End: 2020-09-25

## 2020-09-25 VITALS
SYSTOLIC BLOOD PRESSURE: 114 MMHG | OXYGEN SATURATION: 96 % | HEART RATE: 79 BPM | TEMPERATURE: 98 F | BODY MASS INDEX: 31.34 KG/M2 | DIASTOLIC BLOOD PRESSURE: 58 MMHG | WEIGHT: 166 LBS | RESPIRATION RATE: 18 BRPM | HEIGHT: 61 IN

## 2020-09-25 DIAGNOSIS — U07.1 COVID-19: ICD-10-CM

## 2020-09-25 DIAGNOSIS — R53.1 WEAKNESS: Primary | ICD-10-CM

## 2020-09-25 LAB
ALBUMIN SERPL-MCNC: 3.6 G/DL (ref 3.4–5)
ALP SERPL-CCNC: 74 U/L (ref 55–142)
ALT SERPL-CCNC: 31 U/L (ref 0–77)
ANION GAP SERPL CALC-SCNC: 15 MMOL/L (ref 3–11)
AST SERPL-CCNC: 39 U/L (ref 0–37)
BASOPHILS # BLD AUTO: 0.03 10*3/UL
BASOPHILS NFR BLD AUTO: 0 % (ref 0–2)
BILIRUB SERPL-MCNC: 1 MG/DL (ref 0–1.4)
BILIRUB UR QL: NEGATIVE
BUN SERPL-MCNC: 16 MG/DL (ref 7–25)
CALCIUM ALBUM COR SERPL-MCNC: 9.7 MG/DL (ref 8.6–10.1)
CALCIUM SERPL-MCNC: 9.4 MG/DL (ref 8.6–10.1)
CHLORIDE SERPL-SCNC: 93 MMOL/L (ref 98–107)
CLARITY UR: CLEAR
CO2 SERPL-SCNC: 26 MMOL/L (ref 21–32)
COLOR UR: YELLOW
CREAT SERPL-MCNC: 0.8 MG/DL (ref 0.6–1.1)
EOSINOPHIL # BLD AUTO: 0.07 10*3/UL
EOSINOPHIL NFR BLD AUTO: 1 % (ref 0–3)
ERYTHROCYTE [DISTWIDTH] IN BLOOD BY AUTOMATED COUNT: 14.2 % (ref 11.5–14)
GFR SERPL CREATININE-BSD FRML MDRD: 70 ML/MIN/1.73M*2
GLUCOSE SERPL-MCNC: 118 MG/DL (ref 70–105)
GLUCOSE UR QL: NEGATIVE MG/DL
HCT VFR BLD AUTO: 43 % (ref 34–45)
HGB BLD-MCNC: 15 G/DL (ref 11.5–15.5)
HGB UR QL: NEGATIVE
KETONES UR-MCNC: 15 MG/DL
LEUKOCYTE ESTERASE UR QL STRIP: NEGATIVE
LYMPHOCYTES # BLD AUTO: 0.67 10*3/UL
LYMPHOCYTES NFR BLD AUTO: 8 % (ref 11–47)
MCH RBC QN AUTO: 30.1 PG (ref 28–33)
MCHC RBC AUTO-ENTMCNC: 34.9 G/DL (ref 32–36)
MCV RBC AUTO: 86.3 FL (ref 81–97)
MONOCYTES # BLD AUTO: 0.34 10*3/UL
MONOCYTES NFR BLD AUTO: 4 % (ref 3–11)
NEUTROPHILS # BLD AUTO: 7.77 10*3/UL
NEUTROPHILS NFR BLD AUTO: 87 % (ref 41–81)
NITRITE UR QL: NEGATIVE
PH UR: 5.5 PH
PLATELET # BLD AUTO: 222 10*3/UL (ref 140–350)
PMV BLD AUTO: 8.7 FL (ref 6.9–10.8)
POTASSIUM SERPL-SCNC: 3.9 MMOL/L (ref 3.6–5)
PROT SERPL-MCNC: 7.7 G/DL (ref 6.4–8.2)
PROT UR STRIP-MCNC: ABNORMAL MG/DL
RBC # BLD AUTO: 4.98 10*6/ΜL (ref 3.7–5.3)
SODIUM SERPL-SCNC: 134 MMOL/L (ref 135–145)
SP GR UR: 1.01 (ref 1–1.03)
UROBILINOGEN UR-MCNC: 0.2 E.U./DL
WBC # BLD AUTO: 8.9 10*3/UL (ref 4.5–10.5)

## 2020-09-25 PROCEDURE — 36415 COLL VENOUS BLD VENIPUNCTURE: CPT | Mod: PO | Performed by: FAMILY MEDICINE

## 2020-09-25 PROCEDURE — 2580000300 HC RX 258: Mod: PO | Performed by: FAMILY MEDICINE

## 2020-09-25 PROCEDURE — 85025 COMPLETE CBC W/AUTO DIFF WBC: CPT | Mod: PO | Performed by: FAMILY MEDICINE

## 2020-09-25 PROCEDURE — 96360 HYDRATION IV INFUSION INIT: CPT | Mod: PO | Performed by: FAMILY MEDICINE

## 2020-09-25 PROCEDURE — 81003 URINALYSIS AUTO W/O SCOPE: CPT | Mod: PO | Performed by: FAMILY MEDICINE

## 2020-09-25 PROCEDURE — 99213 OFFICE O/P EST LOW 20 MIN: CPT | Performed by: FAMILY MEDICINE

## 2020-09-25 PROCEDURE — G0463 HOSPITAL OUTPT CLINIC VISIT: HCPCS | Mod: PO | Performed by: FAMILY MEDICINE

## 2020-09-25 PROCEDURE — 80053 COMPREHEN METABOLIC PANEL: CPT | Mod: PO | Performed by: FAMILY MEDICINE

## 2020-09-25 RX ORDER — SODIUM CHLORIDE 9 MG/ML
1000 INJECTION, SOLUTION INTRAVENOUS ONCE
Status: COMPLETED | OUTPATIENT
Start: 2020-09-25 | End: 2020-09-25

## 2020-09-25 RX ADMIN — SODIUM CHLORIDE 1000 ML: 9 INJECTION, SOLUTION INTRAVENOUS at 12:53

## 2020-09-25 ASSESSMENT — ENCOUNTER SYMPTOMS
CHILLS: 0
ABDOMINAL PAIN: 0
DIARRHEA: 0
SHORTNESS OF BREATH: 0
SORE THROAT: 0
VOMITING: 0
FATIGUE: 1
FEVER: 0
EYE REDNESS: 0
COUGH: 1
NAUSEA: 0
DIFFICULTY URINATING: 0
BACK PAIN: 1

## 2020-09-25 ASSESSMENT — PAIN SCALES - GENERAL: PAINLEVEL: 5

## 2020-09-25 NOTE — PROGRESS NOTES
"Subjective      Belkis Burris is a 79 y.o. female who presents for continued weakness with diagnosis of COVID-19.    Patient brought in by her daughter because of continued weakness that has not gotten any better since she was diagnosed with COVID-19 5 days ago.  He denies any nausea, but has had a diminished appetite.  She is not eating very much, but states that she is trying to drink fluids.  Denies any diarrhea.  She does state her cough is getting better.  She did fall this morning her bedroom, but denies hitting her head.  No loss of consciousness.  Denies any injury.  The does state her low back has been bothering her for a few days.  She denies any chest pain or shortness of breath.        Review of Systems   Constitutional: Positive for fatigue. Negative for chills and fever.   HENT: Negative for congestion and sore throat.    Eyes: Negative for redness.   Respiratory: Positive for cough. Negative for shortness of breath.    Gastrointestinal: Negative for abdominal pain, diarrhea, nausea and vomiting.   Genitourinary: Negative for difficulty urinating.   Musculoskeletal: Positive for back pain.   Skin: Negative for rash.       Objective   /58   Pulse 79   Temp 36.7 °C (98 °F) (Temporal)   Resp 18   Ht 1.549 m (5' 1\")   Wt 75.3 kg (166 lb)   SpO2 96%   BMI 31.37 kg/m²     Physical Exam  Vitals signs and nursing note reviewed.   Constitutional:       General: She is not in acute distress.     Appearance: Normal appearance. She is well-developed.   HENT:      Head: Normocephalic and atraumatic.      Right Ear: Tympanic membrane normal.      Left Ear: Tympanic membrane normal.      Mouth/Throat:      Comments: Clear without erythema; no exudate; slightly dry mucous membranes  Eyes:      Conjunctiva/sclera: Conjunctivae normal.   Neck:      Musculoskeletal: Normal range of motion and neck supple.   Cardiovascular:      Comments: Irregularly irregular with no murmur appreciated  Pulmonary:      " Effort: Pulmonary effort is normal.      Comments: Clear to auscultation bilaterally  Abdominal:      Comments: Soft, non-tender, normoactive bowel sounds, no hepatosplenomegaly or masses   Musculoskeletal: Normal range of motion.         General: No deformity.      Right lower leg: No edema.      Left lower leg: No edema.      Comments: Muscle strength 4/5 and symmetric in both upper and lower extremities   Lymphadenopathy:      Cervical: No cervical adenopathy.   Skin:     General: Skin is warm and dry.      Findings: No rash.   Neurological:      Mental Status: She is alert.   Psychiatric:         Mood and Affect: Mood normal.         Behavior: Behavior normal.         Recent Results (from the past 24 hour(s))   CBC w/auto differential Blood, Venous    Collection Time: 09/25/20 12:01 PM   Result Value Ref Range    WBC 8.9 4.5 - 10.5 10*3/uL    RBC 4.98 3.70 - 5.30 10*6/µL    Hemoglobin 15.0 11.5 - 15.5 g/dL    Hematocrit 43.0 34.0 - 45.0 %    MCV 86.3 81.0 - 97.0 fL    MCH 30.1 28.0 - 33.0 pg    MCHC 34.9 32.0 - 36.0 g/dL    RDW 14.2 (H) 11.5 - 14.0 %    Platelets 222 140 - 350 10*3/uL    MPV 8.7 6.9 - 10.8 fL    Neutrophils% 87 (H) 41 - 81 %    Lymphocytes% 8 (L) 11 - 47 %    Monocytes% 4 3 - 11 %    Eosinophils% 1 0 - 3 %    Basophils% 0 0 - 2 %    Neutrophils Absolute 7.77 10*3/uL    Lymphocytes Absolute 0.67 10*3/uL    Monocytes Absolute 0.34 10*3/uL    Eosinophils Absolute 0.07 10*3/uL    Basophils Absolute 0.03 10*3/uL   Comprehensive metabolic panel Blood, Venous    Collection Time: 09/25/20 12:01 PM   Result Value Ref Range    Sodium 134 (L) 135 - 145 mmol/L    Potassium 3.9 3.6 - 5.0 mmol/L    Chloride 93 (L) 98 - 107 mmol/L    CO2 26 21 - 32 mmol/L    Anion Gap 15 (H) 3 - 11 mmol/L    BUN 16 7 - 25 mg/dL    Creatinine 0.80 0.60 - 1.10 mg/dL    Glucose 118 (H) 70 - 105 mg/dL    Calcium 9.4 8.6 - 10.1 mg/dL    AST 39 (H) 0 - 37 U/L    ALT (SGPT) 31 0 - 77 U/L    Alkaline Phosphatase 74 55 - 142 U/L     Total Protein 7.7 6.4 - 8.2 g/dL    Albumin 3.6 3.4 - 5.0 g/dL    Total Bilirubin 1.00 0.00 - 1.40 mg/dL    eGFR 70 >60 mL/min/1.73m*2    Corrected Calcium 9.7 8.6 - 10.1 mg/dL   Urinalysis, dipstick Urine, Clean Catch    Collection Time: 09/25/20 12:01 PM   Result Value Ref Range    Color, Urine Yellow Yellow    Clarity, Urine Clear Clear    Specific Gravity, Urine 1.010 1.003 - 1.030    Leukocytes, Urine Negative Negative    Nitrite, Urine Negative Negative    Protein, Urine Trace (A) Negative mg/dL    Ketones, Urine 15  (A) Negative mg/dL    Urobilinogen, Urine 0.2 <2.0 E.U./dL    Bilirubin, Urine Negative Negative    Blood, Urine Negative Negative    Glucose, Urine Negative Negative mg/dL    pH, Urine 5.5 5.0 - 8.0 PH       Assessment/Plan   Diagnoses and all orders for this visit:    Weakness  -     CBC w/auto differential Blood, Venous  -     Comprehensive metabolic panel Blood, Venous  -     Urinalysis, dipstick Urine, Clean Catch  -     sodium chloride 0.9 % bolus 1,000 mL    COVID-19  -     sodium chloride 0.9 % bolus 1,000 mL      I discussed with the patient and her daughter that her lab work was stable.  She did receive 1 L of normal saline here in the clinic and felt much better.  She is to go home to rest and push fluids.  She is to continue self-isolation.  COVID-19 Information CareSheet given.  She is to follow-up if any concerns.  Both patient and daughter voiced understanding and agreed with plan of care.    MARY OLIVA MD

## 2020-09-25 NOTE — PATIENT INSTRUCTIONS
Patient Education     COVID-19  COVID-19, also known as coronavirus disease or novel coronavirus, is caused by a type of virus that causes respiratory illness. This may lead to inflammation and the buildup of mucus and fluids in the airway of the lungs (pneumonia). There are many different coronaviruses. Most of these viruses only affect animals, but sometimes these viruses can change and infect people.  What are the causes?  This illness is caused by a virus. You may catch the virus by:  · Breathing in droplets from an infected person's cough or sneeze.  · Touching something, like a table or a doorknob, that was exposed to the virus (contaminated) and then touching your mouth, nose, or eyes.  · Being around animals that carry the virus, or eating uncooked or undercooked meat or animal products that contain the virus.  What increases the risk?  You are more likely to develop this condition if you:  · Live in or travel to an area with a COVID-19 outbreak.  · Come in contact with a sick person who recently traveled to an area with a COVID-19 outbreak.  · Provide care for or live with a person who is infected with COVID-19.  What are the signs or symptoms?  COVID-19 causes respiratory illness that can lead to pneumonia. Symptoms of pneumonia may include:  · A fever.  · A cough.  · Difficulty breathing.  How is this diagnosed?  This condition may be diagnosed based on:  · Your signs and symptoms, especially if:  ? You live in an area with a COVID-19 outbreak.  ? You recently traveled to or from an area where the virus is common.  ? You provide care for or live with a person who was diagnosed with COVID-19.  · A physical exam.  · Lab tests, which may include:  ? A nasal swab to take a sample of fluid from your nose.  ? A throat swab to take a sample of fluid from your throat.  ? A sample of mucus from your lungs (sputum).  ? Blood tests.  How is this treated?  There is no medicine to treat COVID-19. Your health care  provider will talk with you about ways to treat your symptoms. This may include rest, fluids, and over-the-counter medicines.  Follow these instructions at home:  Lifestyle  · Use a cool-mist humidifier to add moisture to the air. This can help you breathe more easily.  · Do not use any products that contain nicotine or tobacco, such as cigarettes, e-cigarettes, and chewing tobacco. If you need help quitting, ask your health care provider.  · Rest at home as told by your health care provider.  · Return to your normal activities as told by your health care provider. Ask your health care provider what activities are safe for you.  General instructions  · Take over-the-counter and prescription medicines only as told by your health care provider.  · Drink enough fluid to keep your urine pale yellow.  · Keep all follow-up visits as told by your health care provider. This is important.  How is this prevented?    There is no vaccine to help prevent COVID-19 infection. However, there are steps you can take to protect yourself and others from this virus.  To protect yourself:   · Do not travel to areas where COVID-19 is a risk. The areas where COVID-19 is reported change often. To identify high-risk areas, check the CDC travel website: wwwnc.cdc.gov/travel/notices  · If you live in, or must travel to, an area where COVID-19 is a risk, take precautions to avoid infection.  ? Stay away from people who are sick.  ? Stay away from places where there are animals that may carry the virus. This includes places where animals and animal products are sold. Note that both living and dead animals can carry the virus.  ? Wash your hands often with soap and water. If soap and water are not available, use an alcohol-based hand .  ? Avoid touching your mouth, face, eyes, or nose.  To protect others:  If you have symptoms, take steps to prevent the virus from spreading to others.  · If you think you have a COVID-19 infection,  contact your health care provider right away. Tell your health care team that you think you may have a COVID-19 infection.  · Stay home. Leave your house only to seek medical care.  · Do not travel while you are sick.  · Wash your hands often with soap and water. If soap and water are not available, use alcohol-based hand .  · Stay away from other members of your household. If possible, stay in your own room, separate from others. Use a different bathroom.  · Make sure that all people in your household wash their hands well and often.  · Cough or sneeze into a tissue or your sleeve or elbow. Do not cough or sneeze into your hand or into the air.  · Wear a face mask.  Where to find more information  · Centers for Disease Control and Prevention: www.cdc.gov/coronavirus/2019-ncov/index.html  · World Health Organization: www.who.int/health-topics/coronavirus  Contact a health care provider if:  · You have traveled to an area where COVID-19 is a risk and you have symptoms of the infection.  · You have contact with someone who has traveled to an area where COVID-19 is a risk and you have symptoms of the infection.  Get help right away if:  · You have trouble breathing.  · You have chest pain.  Summary  · COVID-19 is caused by a type of virus that causes respiratory illness. This may lead to inflammation and the buildup of mucus and fluids in the airway of the lungs (pneumonia).  · You are more likely to develop this condition if you live in or travel to an area with a COVID-19 outbreak.  · There is no medicine to treat COVID-19. Your health care provider will talk with you about ways to treat your symptoms.  · Take steps to protect yourself and others from infection. Wash your hands often. Stay away from other people who are sick and wear a mask if you are sick.  This information is not intended to replace advice given to you by your health care provider. Make sure you discuss any questions you have with your  health care provider.  Document Released: 01/23/2020 Document Revised: 04/14/2020 Document Reviewed: 01/23/2020  Elsevier Patient Education © 2020 [employer]

## 2020-10-29 ENCOUNTER — TELEPHONE (OUTPATIENT)
Dept: CARDIOLOGY | Facility: CLINIC | Age: 80
End: 2020-10-29

## 2020-10-29 NOTE — TELEPHONE ENCOUNTER
[facility]  would like a call back to verify patient medications/dosages  Also, does her eliquis need to be reduced as she is more than 79 years old.

## 2020-10-29 NOTE — TELEPHONE ENCOUNTER
Left message for Dr. Guzmán's nurse giving her Addy's recommendations. I will fax the med list to them. Encouraged her to call with questions.  I called the pharmacy and they have not filled Dexilant for her, so I crossed Dexilant off her medication list before sending it.

## 2020-11-10 ENCOUNTER — TELEPHONE (OUTPATIENT)
Dept: CARDIOLOGY | Facility: CLINIC | Age: 80
End: 2020-11-10

## 2020-11-10 NOTE — TELEPHONE ENCOUNTER
Spoke with Deanna, I let her know it looks like she was prescribed Pralulent because she has experienced statin intolerance in the past.

## 2020-12-16 ENCOUNTER — TELEPHONE (OUTPATIENT)
Dept: CARDIOLOGY | Facility: CLINIC | Age: 80
End: 2020-12-16

## 2020-12-16 NOTE — TELEPHONE ENCOUNTER
PT will be sending in an request for Addy to fill out for her Praluent as she is needing financial assistance - expensive for her. Call if you have any questions

## 2020-12-18 DIAGNOSIS — I73.9 PVD (PERIPHERAL VASCULAR DISEASE) (CMS/HCC): Primary | ICD-10-CM

## 2021-01-22 ENCOUNTER — TELEPHONE (OUTPATIENT)
Dept: CARDIOLOGY | Facility: CLINIC | Age: 81
End: 2021-01-22

## 2021-01-22 NOTE — TELEPHONE ENCOUNTER
Received call from patient's daughter telling lipid clinic had faxed completed PAP forms to Addy to fax to [employer] for Praluent a while back and has not heard back from [employer]. Patient is out of Praluent. Lipid clinic spoke with PAP representative Sarah, informs lipid clinic needs completed PAP forms and proof of annul income. Returned call to patient informed [employer] needing proof of annual income W2 form. Patient to fax lipid clinic proof of Social Security income. Spoke with Dilcia at [facility] informed needing Addy to sign PAP Praluent script form. Addy is in vascular today and will have Addy sign form. PAP Praluent script form e mailed to iDlcia for Addy to sign.

## 2021-01-22 NOTE — TELEPHONE ENCOUNTER
Received call from patient's daughter telling lipid clinic had faxed completed PAP forms to Addy to fax to [employer] for Praluent a while back and has not heard back from [employer]. Patient is out of Praluent. Lipid clinic spoke with PAP representative Sarah, informs lipid clinic needs completed PAP forms and proof of annual income. Returned call to patient informed [employer] needing proof of annual income W2 form. Patient to fax lipid clinic proof of Social Security income. Spoke with Dilcia at [facility] informed needing Addy to sign PAP Praluent script form. Addy is in vascular today and will have Addy sign form. PAP Praluent script form e mailed to Dilcia for Addy to sign. Received fax from Bee r/t Social Security letter stating annual income. Received signed PAP Praluent script from Addy [facility] , PAP forms, lab and proof of SS annual income faxed to [employer].

## 2021-01-22 NOTE — TELEPHONE ENCOUNTER
PT would like to get the paperwork sent so this pt can get her Praluent shot.     Pt is a month behind already, this really needs to get faxed back to the [employer] company.

## 2021-01-22 NOTE — TELEPHONE ENCOUNTER
Call PT about shots PT is supposed have for her cholesterol - sft trns to Nurse Becky MONTES to see if she assist or return call

## 2021-01-22 NOTE — TELEPHONE ENCOUNTER
PT's daughter Bee is calling about the paperwork that JH was suppose to fax so patient could get her prillant??

## 2021-01-22 NOTE — TELEPHONE ENCOUNTER
Received call from patient's daughter telling lipid clinic had faxed completed PAP forms to Addy to fax to [employer] for Praluent a while back and has not heard back from [employer]. Patient is out of Praluent. Lipid clinic spoke with PAP representative Sarah, informs lipid clinic needs completed PAP forms and proof of annul income. Returned call to patient informed [employer] needing proof of annual income W2 form. Patient to fax lipid clinic proof of Social Security income. Spoke with Dilcia at [facility] informed needing Addy to sign PAP Praluent script form. Addy is in vascular today and will have Addy sign form. PAP Praluent script form e mailed to Dilcia for Addy to sign. Adriane

## 2021-01-29 ENCOUNTER — TELEPHONE (OUTPATIENT)
Dept: CARDIOLOGY | Facility: CLINIC | Age: 81
End: 2021-01-29

## 2021-01-29 NOTE — TELEPHONE ENCOUNTER
Spoke with [employer] representative Fran r/t apporval status. Fran told lipid clinic there was no refills filled in for Praluent on the form and provider placed wrong date on form. Lipid clinic corrected these areas and refaxed to PAP.

## 2021-02-02 ENCOUNTER — TELEPHONE (OUTPATIENT)
Dept: CARDIOLOGY | Facility: CLINIC | Age: 81
End: 2021-02-02

## 2021-02-02 NOTE — TELEPHONE ENCOUNTER
Spoke with Radha at [facility] r/t approval status for Praluent. Radha told lipid clinic need new Praluent script for Praluent due to physician's date is not legible and the script is not completed. Told Radha this was completed and refaxed to PAP. Radha told lipid clinic they have in their records they need a new Praluent script form and faxed to [employer].

## 2021-02-11 ENCOUNTER — TELEPHONE (OUTPATIENT)
Dept: CARDIOLOGY | Facility: CLINIC | Age: 81
End: 2021-02-11

## 2021-02-11 NOTE — TELEPHONE ENCOUNTER
Faxed PAP forms to [employer] with Social Security denial letter and lipid results for Praluent approval.

## 2021-02-15 ENCOUNTER — TELEPHONE (OUTPATIENT)
Dept: CARDIOLOGY | Facility: CLINIC | Age: 81
End: 2021-02-15

## 2021-02-15 NOTE — TELEPHONE ENCOUNTER
Spoke with [employer] representative for Praluent approval. Representative asked if patient had Medicare part D, lipid clinic told PAP representative no and it was marked on form patient has no drug ins. Representative told lipid clinic we have to ask to be sure patient has no Medicare part D or drug ins. Representative placed patients PAP forms back to review.

## 2021-02-17 ENCOUNTER — TELEPHONE (OUTPATIENT)
Dept: CARDIOLOGY | Facility: CLINIC | Age: 81
End: 2021-02-17

## 2021-02-17 NOTE — TELEPHONE ENCOUNTER
"Belkis specifically asked to speak to Adriane MONTES in regards to her Praluent. Stated that \"we have working on this together.\" Also stated that she had her direct number in her phone but couldn't find it. Did give Adriane's direct number to patient. No further questions/concerns from patient.  "

## 2021-02-17 NOTE — TELEPHONE ENCOUNTER
Received call from patient asking if approved for Praluent. Told patient lipid clinic call PAP Monday and PAP questioned if patient had Medicare part D, told them no, it's documented on form. PAP representative stated, we need to make sure. Will send it back to the review team.

## 2021-02-23 ENCOUNTER — TELEPHONE (OUTPATIENT)
Dept: CARDIOLOGY | Facility: CLINIC | Age: 81
End: 2021-02-23

## 2021-02-23 NOTE — TELEPHONE ENCOUNTER
Spoke with Jevon at [facility] for patient's approval status for Praluent. Jevon updated patient records and sent it in for review, Jevon told lipid clinic this should be a quick review since patient has no drug ins.

## 2021-03-18 DIAGNOSIS — Z23 HIGH PRIORITY FOR 2019-NCOV VACCINE: ICD-10-CM

## 2021-04-19 ENCOUNTER — DOCUMENTATION (OUTPATIENT)
Dept: CARDIOLOGY | Facility: CLINIC | Age: 81
End: 2021-04-19

## 2021-04-19 NOTE — PROGRESS NOTES
"I received a couple of studies from Belkis's daughter Radha.    An echocardiogram report from the cardiovascular center [address] [place], [place] from a Dr. James Fontana cardiologist.  One of them is an EL that appears to be a screening test.  The right EL is 1.0 left EL is 1.1.  No other data.      The other is an echocardiogram handwritten report.  It has no official interpretation but it does show \"fractional shortening 50-55%\" and \"ejection fraction 26%\".  My suspicion is is that they have these turned around.  It does not say anything in the impression about decreased LV systolic function.    If we could try to obtain the official report from this physician in [place]?  "

## 2021-04-22 ENCOUNTER — TELEPHONE (OUTPATIENT)
Dept: CARDIOLOGY | Facility: CLINIC | Age: 81
End: 2021-04-22

## 2021-04-22 NOTE — TELEPHONE ENCOUNTER
Patient's daughter recently delivered echocardiogram and EL study worksheets from your office.  Could you please fax us the official report of these studies?  [phone]      Sent to [facility] [address] [place] [id number]   Dr Fontana  Fax:  [phone]

## 2021-04-26 ENCOUNTER — DOCUMENTATION (OUTPATIENT)
Dept: CARDIOLOGY | Facility: CLINIC | Age: 81
End: 2021-04-26

## 2021-04-26 NOTE — PROGRESS NOTES
Patient's daughter dropped off labs dated 3/27/2021.  Hemoglobin 16.  Platelets 260.  Sodium 144.  Potassium 4.9.  Creatinine 0.9.  LDL 99, HDL 55, triglycerides 105, total cholesterol 175.  AST 20, ALT 38

## 2021-05-06 ENCOUNTER — TELEPHONE (OUTPATIENT)
Dept: CARDIOLOGY | Facility: CLINIC | Age: 81
End: 2021-05-06

## 2021-05-06 NOTE — TELEPHONE ENCOUNTER
Please call Daughter Radha ADAMS. Re: PT's upcoming appointments or testing she may need. As PT will be back this month and back for a short time - she would like to visit with Nurse. [phone] for Radha or Mom [phone]

## 2021-05-06 NOTE — TELEPHONE ENCOUNTER
Please call Daughter Radha ADAMS. Re: PT's upcoming appointments or testing she may need. As PT will be back this month and back for a short time - she would like to visit with Nurse. [phone] for Radha or Mom [phone] Daughter Radha brought in recent lab results & EL & Echo from [facility]. Checking to make sure Mom is not needing more labs or testing done prior upcoming f/u.

## 2021-05-07 NOTE — TELEPHONE ENCOUNTER
Returned call to daughter, Bee.  We have received records from [place] [facility].  Patient does need a follow-up appointment.  Scheduled for 6/9/2021 with Dr. Reece at 11:00.  Patient aware of date, time, and location.  She had no further questions or concerns.

## 2021-05-07 NOTE — TELEPHONE ENCOUNTER
Left message for Bee letting her know I was going to call her mom and speak with her regarding a follow up appointment.  Spoke with Belkis, who said she had been doing well. She found a vascular doctor in [place] who has been managing her testing. She is seeing Dr. Shaver for her PCP at [facility] in June. She has enough medication to get her through to her appointment. [facility]

## 2021-06-09 ENCOUNTER — OFFICE VISIT (OUTPATIENT)
Dept: CARDIOLOGY | Facility: CLINIC | Age: 81
End: 2021-06-09
Payer: MEDICARE

## 2021-06-09 VITALS
HEART RATE: 73 BPM | BODY MASS INDEX: 32.2 KG/M2 | HEIGHT: 62 IN | SYSTOLIC BLOOD PRESSURE: 120 MMHG | DIASTOLIC BLOOD PRESSURE: 70 MMHG | WEIGHT: 175 LBS | OXYGEN SATURATION: 97 %

## 2021-06-09 DIAGNOSIS — I73.9 PAD (PERIPHERAL ARTERY DISEASE) (CMS/HCC): Primary | ICD-10-CM

## 2021-06-09 DIAGNOSIS — I42.9 CARDIOMYOPATHY, UNSPECIFIED TYPE (CMS/HCC): ICD-10-CM

## 2021-06-09 PROCEDURE — 99214 OFFICE O/P EST MOD 30 MIN: CPT | Performed by: INTERNAL MEDICINE

## 2021-06-09 PROCEDURE — G0463 HOSPITAL OUTPT CLINIC VISIT: HCPCS | Performed by: INTERNAL MEDICINE

## 2021-06-09 RX ORDER — ASPIRIN 81 MG/1
81 TABLET ORAL DAILY
COMMUNITY

## 2021-06-09 RX ORDER — VIT C/E/ZN/COPPR/LUTEIN/ZEAXAN 250MG-90MG
1 CAPSULE ORAL 2 TIMES DAILY
COMMUNITY
End: 2023-06-28 | Stop reason: ALTCHOICE

## 2021-06-09 ASSESSMENT — PAIN SCALES - GENERAL: PAINLEVEL: 0-NO PAIN

## 2021-06-09 NOTE — PATIENT INSTRUCTIONS
· With regards to your heart function we will repeat an ultrasound of your heart and try to set up an office visit the same day to review it.  If the squeeze of your heart is truly 26% we will need to optimize her medical therapy to try and get stronger and evaluate for coronary artery disease  · With regards to the peripheral artery disease you have for the stents in your legs will repeat ultrasounds of your legs  · Plan to follow-up with Priya Huang for a vascular medicine to review your lower extremity ultrasounds  · With regards to your swelling keep an eye on it.  Try to keep your salt intake less than 2 g/day by avoiding things like fast food canned foods canned soups chips crackers pizza etc.  · He will be predisposed if your heart function is truly this low to filling up with fluid and people often end up in the hospital because they do not watch their salt intake.  In the future we may end up needing a diuretic or a water pill  · If you could start weighing yourself first thing in the morning on an empty stomach after using the restroom and maintaining a weight log.  If you ever gain more than 3 to 4 pounds in the course of 1 to 2 days this is generally water weight and you should contact our office  · You can reach Anabel at [phone] and she is my nurse and will get in contact with me at any time 4 to 6 weeks with myself or Gal Bird

## 2021-06-09 NOTE — PROGRESS NOTES
Cardiology Outpatient Note                                            ABIGAIL French Notes:     Chief Complaint   Patient presents with   • Atrial Fibrillation     Annual follow-up    • Carotid Artery Disease   • Hyperlipidemia   • Hypertension   • PAD- Lower       Assessment/ Plan:    Atrial Fibrillation  Permanent afib. Has been for at least 3 years. CHADSVASc 6. TTE from 2017 showed normal biatrial size and EF.  -rate control strategy after meeting with EP  -continue cardizem  -eliquis  -Would be reasonable to repeat a 24 to 48-hour Holter monitor to ensure her rates are controlled   -We will see patient back in 1 year unless clinical change then will  see back sooner    Cardiomyopathy  Patient apparently had a echocardiogram last year in [place] and the report she brings in his handwritten saying her EF is 26%.  Interestingly however there are discrepancies in the ejection fraction with one saying 50 to 55% in another spots and 26%.  The report was also inappropriately dated and I am not sure how much validity it actually has.  Last known EF from 2017 was normal at 69%.  NYHA/AHA 1/C.  -Volume status: Warm and dry on exam  -Diuretic strategy: May end up needing diuretics as needed, however is currently going to start watching her salt intake  -GDMT will be determined based on results of repeat echocardiogram.  Certainly not on a leave patient on Cardizem if her ejection fraction is truly low.  -Repeat echo soon as able and office follow-up same day to review results  -May ultimately end up sending to the heart failure clinic    History of CVA  Carotid Artery Stenosis  LICA stent on 7/11/17, known occluded VIRI.  Findings stable on repeat duplex 5/2020 with a patent LICA stent and no significant change from prior study.  -repeat duplex in 5/2021    Hypertension  Controlled  -encouraged patient to check BP at home and maintain a log  -maintain <2g salt restricted diet  -cardizem  -Reviewed with patient how to  check blood pressures at home after relaxing for 5 minutes sitting comfortably in a quiet environment.  She will also take her blood pressure cuff with her to a doctor's visit to ensure that it is calibrated appropriately    PAD  Repeat duplex does show 75 to 99% stenosis in the right SFA following angioplasty and atherectomy on 9/2019.  This has progressed from prior report 11/2019.  -No claudication only leg fatigue.   -Patient had ABIs in [place] and they were normal.  We will repeat lower extremity arterial duplex to follow-up on iliac stent    Dyslipidemia  Last lipid panel 5/22/20 showed LDL 55, HDL 39, TG's 72, nonHDL 70.  Goal LDL <70 and nonHDL <100. Statin intolerant from myalgias.    H/O DVT  -eliquis    History Upper GI Bleed  Secondary to Lisa Imani tear and had no intervention. Has not had any black or red stools.     Obesity  BMI 32.  -counseled on diet and lifestyle modification    HPI:  Belkis Burris is a 80 y.o. female being seen 07/13/21   for Atrial Fibrillation (Annual follow-up ), Carotid Artery Disease, Hyperlipidemia, Hypertension, and PAD- Lower     Patient states she is doing fairly well overall.  She was recently seen in [place] and had an echocardiogram performed at that time which is on a handwritten piece of paper and is not very clear as to the findings.  It does suggest that her ejection fraction is lower, approximately 26%.  She states she has had some shortness of breath with exertion particularly walking up hills now that she is back in the hills.  She has noticed some intermittent palpitations also but are not new or worsening.  She has her baseline bilateral edema she says which on exam is nonpitting likely more related to adiposity.  She denies any chest pain dizziness lightheadedness falls or syncope.  No claudication.  She has not necessarily been watching her salt intake or weighing herself.  She remains diligent on her aspirin Eliquis Cardizem and Praluent.      CARDIAC  PROBLEM LIST:  Primary Cardiologist: Dr. Gallardo      I. CARDIAC  1. Permanent atrial fibrillation  A. On anticoagulation    2. Statin intolerance      II. VASCULAR  1. Carotid artery stenosis  A. Left internal carotid artery stent  B. Right internal carotid artery occlusion    2. PAD  A. Left common iliac stent, left distal SFA DCB (July 2019)  B. Right mid and distal DCB (September 2019)      IV. OTHER  1. Dyslipidemia    Last updated: Addy Cox PA-C 5/1/20     Review of Systems:  The following system(s) were reviewed and negative.  Pertinent positive and negative findings are noted in the HPI.    [x]                     Const                                   [x]                      Eyes   [x]                     ENT                                     [x]                      Resp                                       [x]                     CV                                       [x]                      GI   [x]                                                            [x]                      Neuro   [x]                     Musc                                    [x]                      Skin   [x]                     Psych                                  [x]                      Endo   [x]                     Allergy                                 [x]                      Heme/Lymph.    Histories:  Past Medical History:   Diagnosis Date   • A-fib (CMS/HCC) ([facility])    • A-fib (CMS/HCC) ([facility])    • Cardiovascular disease    • Complication of anesthesia    • Delayed emergence from general anesthesia    • DVT (deep venous thrombosis) (CMS/HCC) ([facility])    • Dysrhythmia     Afib   • GERD (gastroesophageal reflux disease) 5/2/2020   • Hypercholesteremia    • Scoliosis    • TIA (transient ischemic attack)    • Wears dentures     upper   • Wears partial dentures     lower     Past Surgical History:   Procedure Laterality Date   • AIFF ANGIO Bilateral 7/24/2019    Procedure: Aiff Angio;  Surgeon: Kailash  MD Paulina;  Location: [facility];  Service: Peripheral Vascular;  Laterality: Bilateral;  Late case #1 at 0830   • AIFF ANGIO Right 2019    Procedure: Aiff Angio staged;  Surgeon: Kailash Gallardo MD;  Location: [facility];  Service: Peripheral Vascular;  Laterality: Right;   • ATHERECTOMY - PERIPHERAL N/A 2019    Procedure: Atherectomy - peripheral;  Surgeon: Kailash Gallardo MD;  Location: [facility];  Service: Peripheral Vascular;  Laterality: N/A;   • ATHERECTOMY - PERIPHERAL N/A 2019    Procedure: Atherectomy - peripheral;  Surgeon: Kailash Gallardo MD;  Location: [facility];  Service: Peripheral Vascular;  Laterality: N/A;   • CAROTID STENT     • PERIPHERAL ARTERIAL STENT GRAFT N/A 2019    Procedure: Peripheral artery stenting;  Surgeon: Kailash Gallardo MD;  Location: [facility];  Service: Peripheral Vascular;  Laterality: N/A;   • TUBAL LIGATION       Family History   Problem Relation Age of Onset   • Other Mother         Complications of TB and emphysema, Cause of Death   • Blood Clots Father         Cause of Death   • Arrhythmia Sister    • Other Brother         Carotid endarterectomy     Social History     Occupational History   • Not on file   Tobacco Use   • Smoking status: Former Smoker     Packs/day: 0.50     Years: 10.00     Pack years: 5.00     Types: Cigarettes     Quit date:      Years since quittin.5   • Smokeless tobacco: Never Used   Substance and Sexual Activity   • Alcohol use: No   • Drug use: No   • Sexual activity: Defer   Social History Narrative   • Not on file     Social History     Tobacco Use   Smoking Status Former Smoker   • Packs/day: 0.50   • Years: 10.00   • Pack years: 5.00   • Types: Cigarettes   • Quit date:    • Years since quittin.5   Smokeless Tobacco Never Used     Social History     Substance and Sexual Activity   Drug Use No       Medications:   Current Outpatient Medications   Medication Sig Dispense  "Refill   • aspirin 81 mg EC tablet Take 81 mg by mouth daily     • vit C,E-Zn-copper-lutein-zeaxan (PreserVision AREDS-2) 250-90-40-1 mg capsule Take 1 capsule by mouth 2 (two) times a day     • alirocumab (PRALUENT) 75 mg/mL pen injector Inject 75 mg under the skin every 14 (fourteen) days     • dexlansoprazole (Dexilant) 30 mg capsule Take 30 mg by mouth daily       • diltiazem CD (CARDIZEM CD) 180 mg 24 hr capsule Take 1 capsule (180 mg total) by mouth daily. 90 capsule 3   • Lactobacillus acidophilus (PROBIOTIC) 10 billion cell capsule Take 1 capsule by mouth daily       • apixaban (ELIQUIS) 5 mg tablet Take 5 mg by mouth 2 (two) times a day         No current facility-administered medications for this visit.       Allergies:  Allergies   Allergen Reactions   • Amoxicillin Itching   • Metronidazole Hives   • Sulfa (Sulfonamide Antibiotics) Itching     I have reviewed and confirmed Belkis Burris's   allergies this visit.     Objective:    Vitals:    06/09/21 1111   BP: 120/70   Pulse: 73   SpO2: 97%   Height: 1.575 m (5' 2\")   Weight: 79.4 kg (175 lb)   PainSc: 0-No pain        General:  Appears appropriate for stated age.  Awake, alert, and oriented x3. NAD, obese  Head:  Normocephalic, atraumatic.  Neck:  Supple.  No JVD  Eyes:  Anicteric  Cardiovascular: Irregularly irregular rhythm  Respiratory:  No stridor   Abdominal:  Appears nondistended.    Extremities:  No clubbing, cyanosis.  No edema.    Skin:  No rashes noted.  Neurological:  CN 2 - 12 intact.  No gross sensory or motor deficits noted.  Musculoskeletal:  Able to move all 4 extremities spontaneously.    Data Review:   Sodium   Date Value Ref Range Status   09/25/2020 134 (L) 135 - 145 mmol/L Final     Potassium   Date Value Ref Range Status   09/25/2020 3.9 3.6 - 5.0 mmol/L Final     Chloride   Date Value Ref Range Status   09/25/2020 93 (L) 98 - 107 mmol/L Final     CO2   Date Value Ref Range Status   09/25/2020 26 21 - 32 mmol/L Final     BUN "   Date Value Ref Range Status   09/25/2020 16 7 - 25 mg/dL Final     Creatinine   Date Value Ref Range Status   09/25/2020 0.80 0.60 - 1.10 mg/dL Final     Glucose   Date Value Ref Range Status   09/25/2020 118 (H) 70 - 105 mg/dL Final     Calcium   Date Value Ref Range Status   09/25/2020 9.4 8.6 - 10.1 mg/dL Final     Total CK   Date Value Ref Range Status   05/27/2017 32 26 - 192 IU/L      Troponin I   Date Value Ref Range Status   03/11/2019 <0.030 <0.030 ng/mL Final     BNP   Date Value Ref Range Status   05/30/2017 543 (H) 0 - 99 pg/mL      Lipids:    Lab Results   Component Value Date    CHOL 147 06/07/2021    CHOL 108 05/22/2020    CHOL 285 (A) 11/07/2019     Lab Results   Component Value Date    HDL 47 06/07/2021    HDL 39 05/22/2020    HDL 42 11/07/2019     Lab Results   Component Value Date    LDLCALC 80 06/07/2021    LDLCALC 55 05/22/2020    LDLCALC 206 01/08/2019     Lab Results   Component Value Date    TRIG 109 06/07/2021    TRIG 72 05/22/2020    TRIG 131 11/07/2019        TSH:   Lab Results   Component Value Date    TSH 0.740 06/24/2016     Magnesium:   Lab Results   Component Value Date    MG 1.9 03/12/2019     Protime-INR:   Lab Results   Component Value Date    PT 16.1 (H) 07/07/2017    INR 1.3 (H) 07/07/2017     A1c: No results found for: HGBA1C    Patient Instructions   · With regards to your heart function we will repeat an ultrasound of your heart and try to set up an office visit the same day to review it.  If the squeeze of your heart is truly 26% we will need to optimize her medical therapy to try and get stronger and evaluate for coronary artery disease  · With regards to the peripheral artery disease you have for the stents in your legs will repeat ultrasounds of your legs  · Plan to follow-up with Priya Huang for a vascular medicine to review your lower extremity ultrasounds  · With regards to your swelling keep an eye on it.  Try to keep your salt intake less than 2 g/day by  avoiding things like fast food canned foods canned soups chips crackers pizza etc.  · He will be predisposed if your heart function is truly this low to filling up with fluid and people often end up in the hospital because they do not watch their salt intake.  In the future we may end up needing a diuretic or a water pill  · If you could start weighing yourself first thing in the morning on an empty stomach after using the restroom and maintaining a weight log.  If you ever gain more than 3 to 4 pounds in the course of 1 to 2 days this is generally water weight and you should contact our office  · You can reach Anabel at [phone] and she is my nurse and will get in contact with me at any time           Next follow-up with myself or Gal Bird in 4 to 6 weeks    Sincerely,    Leo Reece, DO  07/13/21

## 2021-07-07 PROBLEM — K92.2 UGIB (UPPER GASTROINTESTINAL BLEED): Status: ACTIVE | Noted: 2020-05-02

## 2021-07-07 PROBLEM — R94.31 PROLONGED Q-T INTERVAL ON ECG: Status: ACTIVE | Noted: 2020-05-02

## 2021-07-08 ENCOUNTER — ANCILLARY PROCEDURE (OUTPATIENT)
Dept: CARDIOLOGY | Facility: CLINIC | Age: 81
End: 2021-07-08
Payer: MEDICARE

## 2021-07-08 VITALS
HEIGHT: 62 IN | WEIGHT: 175 LBS | BODY MASS INDEX: 32.2 KG/M2 | SYSTOLIC BLOOD PRESSURE: 125 MMHG | DIASTOLIC BLOOD PRESSURE: 74 MMHG

## 2021-07-08 DIAGNOSIS — I42.9 CARDIOMYOPATHY, UNSPECIFIED TYPE (CMS/HCC): ICD-10-CM

## 2021-07-08 DIAGNOSIS — I73.9 PAD (PERIPHERAL ARTERY DISEASE) (CMS/HCC): ICD-10-CM

## 2021-07-08 PROCEDURE — 2500000200 HC RX 250 WO HCPCS: Performed by: INTERNAL MEDICINE

## 2021-07-08 PROCEDURE — 2580000300 HC RX 258: Mod: NCP | Performed by: INTERNAL MEDICINE

## 2021-07-08 PROCEDURE — 93306 TTE W/DOPPLER COMPLETE: CPT

## 2021-07-08 PROCEDURE — 2550000100 HC RX 255: Performed by: INTERNAL MEDICINE

## 2021-07-08 PROCEDURE — 93978 VASCULAR STUDY: CPT

## 2021-07-08 PROCEDURE — C8929 TTE W OR WO FOL WCON,DOPPLER: HCPCS

## 2021-07-08 RX ORDER — SODIUM CHLORIDE 9 MG/ML
20 INJECTION INTRAMUSCULAR; INTRAVENOUS; SUBCUTANEOUS ONCE
Status: COMPLETED | OUTPATIENT
Start: 2021-07-08 | End: 2021-07-08

## 2021-07-08 RX ADMIN — SODIUM CHLORIDE 9 ML: 9 INJECTION INTRAMUSCULAR; INTRAVENOUS; SUBCUTANEOUS at 12:21

## 2021-07-08 RX ADMIN — SODIUM CHLORIDE 2 ML: 9 INJECTION INTRAMUSCULAR; INTRAVENOUS; SUBCUTANEOUS at 12:22

## 2021-07-09 ENCOUNTER — TELEPHONE (OUTPATIENT)
Dept: CARDIOLOGY | Facility: CLINIC | Age: 81
End: 2021-07-09

## 2021-07-09 LAB
ASCENDING AORTA: 2.98 CM
AV LVOT PEAK GRADIENT: 3 MMHG
AV MEAN GRADIENT: 6.59 MMHG
AV PEAK GRADIENT: 11.02 MMHG
BSA FOR ECHO PROCEDURE: 1.86 M2
DOP CALC AO PEAK VEL: 1.66 M/S
DOP CALC AO VTI: 36.5 CM
DOP CALC LVOT DIAMETER: 1.93 CM
DOP CALC LVOT STROKE VOLUME: 53 CM3
DOP CALC MV VTI: 24.81 CM
DOP CALC RVOT PEAK VEL: 0.6 M/S
DOP CALCLVOT PEAK VEL VTI: 18.2 CM
E/E' RATIO (AVERAGE): 11.8
E/E' RATIO: 12.1
ECHO EF ESTIMATED: 50 %
EJECTION FRACTION: 50 %
ERAP: 5 MMHG
INTERVENTRICULAR SEPTUM: 0.9 CM (ref 0.6–1.1)
IVC PROX: 2.02 CM
LA AREA A4C SYSTOLE: 60.7 CM3
LEFT ATRIUM SIZE: 3.74 CM
LEFT ATRIUM VOLUME INDEX: 35 ML/M2
LEFT ATRIUM VOLUME: 63.2 CM3
LEFT INTERNAL DIMENSION IN SYSTOLE: 2.8 CM (ref 2.52–3.81)
LEFT VENTRICLE DIASTOLIC VOLUME: 66 CM3
LEFT VENTRICLE SYSTOLIC VOLUME: 33 CM3
LEFT VENTRICULAR INTERNAL DIMENSION IN DIASTOLE: 4.2 CM (ref 4.25–5.9)
LVAD-AP2: 23.6 CM2
ML OF DILUTED DEFINITY INJECTED: 2 ML
MV DEC SLOPE: 5530 MM/S2
MV DT: 156 MS
MV MEAN GRADIENT: 2.1 MMHG
MV PEAK E VEL: 105 CM/S
MV PEAK GRADIENT: 5 MMHG
MV STENOSIS PRESSURE HALF TIME: 61 MS
MV VALVE AREA P 1/2 METHOD: 3.61 CM2
MV VMAX: 113 CM/S
MVA (VTI): 2.15 CM2
POSTERIOR WALL: 0.9 CM (ref 0.6–1.1)
PULM VEIN S/D RATIO: 0.8
PV PEAK D VEL: 59 CM/S
PV PEAK GRADIENT: 7.51 MMHG
PV PEAK S VEL: 45 CM/S
PV VMAX: 1.37 M/S
RA AREA: 16.4 CM2
RH CV ECHO AV VALVE AREA PLANIMETRY: 1.4 CM2
RH CV ECHO AV VALVE AREA VEL: 1.5 CM2
RH CV ECHO AV VALVE AREA VTI: 1.5 CM2
RH LVOT PEAK VELOCITY REST: 0.9 M/S
RIGHT VENTRICULAR INTERNAL DIMENSION IN DIASTOLE: 3 CM
RV AP4 BASE: 3.7 CM
S': 8.9 CM/S
TDI: 8.7 CM/S
TDILATERAL: 9.2 CM/S
TR MAX PG: 46.51 MMHG
TRICUSPID ANNULAR PLANE SYSTOLIC EXCURSION: 1.3 CM
TRICUSPID VALVE PEAK REGURGITATION VELOCITY: 3.41 M/S
TV REST PULMONARY ARTERY PRESSURE: 52 MMHG
Z-SCORE OF LEFT VENTRICULAR DIMENSION IN END DIASTOLE: -1.76
Z-SCORE OF LEFT VENTRICULAR DIMENSION IN END SYSTOLE: -0.79

## 2021-07-09 PROCEDURE — 93306 TTE W/DOPPLER COMPLETE: CPT | Mod: 26 | Performed by: INTERNAL MEDICINE

## 2021-07-12 ENCOUNTER — TELEPHONE (OUTPATIENT)
Dept: CARDIOLOGY | Facility: CLINIC | Age: 81
End: 2021-07-12

## 2021-07-12 DIAGNOSIS — I48.92 ATRIAL FIBRILLATION AND FLUTTER (CMS/HCC): Primary | ICD-10-CM

## 2021-07-12 DIAGNOSIS — I48.91 ATRIAL FIBRILLATION AND FLUTTER (CMS/HCC): Primary | ICD-10-CM

## 2021-07-12 LAB
ABDOMINAL LT EXT ILIAC VEL: 140
ABDOMINAL LT INT ILIAC VEL: 43
ABDOMINAL RT EXT ILIAC VEL: 126
LEFT PROFUNDA SYS PSV: 69
LEFT SUPER FEMORAL PROX SYS PSV: 124
LL ARTERIAL DUPLEX COMMON FEMORAL PEAK SYS VEL: 127
LTDCFA1803: NORMAL
LTDCIA1803: NORMAL
LTDEIA1803: NORMAL
LTDIIA1803: NORMAL
LTDPFAO1803: NORMAL
LTDSFAO1803: NORMAL
LTSCFA1803: NORMAL
LTSCIA1803: NORMAL
LTSEIA1803: NORMAL
LTSIIA1803: NORMAL
LTSPFAO1803: NORMAL
LTSSFAO1803: NORMAL
RH AO ENDOGRAFT LT CIA: 74
RH LT ILIAC DUP CIA DIST: 70
RH LT ILIAC DUP CIA MID: 61
RH LT ILIAC DUP DIST AO: 52
RH LT ILIAC DUP EIA DIST: 98
RH LT ILIAC DUP EIA MID: 105
RH RT ILIAC DUP CIA DIST: 170
RH RT ILIAC DUP CIA MID: 142
RH RT ILIAC DUP CIA PROX: 62
RH RT ILIAC DUP DIST AO: 52
RH RT ILIAC DUP EIA DIST: 99
RH RT ILIAC DUP EIA MID: 105
RIGHT PROFUNDA SYS PSV: 80
RIGHT SUPER FEMORAL PROX SYS PSV: 80
RL ARTERIAL DUPLEX COMMON FEMORAL PEAK SYS VEL: 97
RTDCFA1801: NORMAL
RTDCIA1801: NORMAL
RTDEIA1801: NORMAL
RTDPFAO1801: NORMAL
RTDSFAO1801: NORMAL
RTSCFA1801: NORMAL
RTSCIA1801: NORMAL
RTSEIA1801: NORMAL
RTSPFAO1801: NORMAL
RTSSFAO1801: NORMAL

## 2021-07-12 PROCEDURE — 93978 VASCULAR STUDY: CPT | Mod: 26 | Performed by: INTERNAL MEDICINE

## 2021-07-12 NOTE — TELEPHONE ENCOUNTER
Pt's daughter call, please call her with test results. If unavailble, leave a message with specifics especially ejection fraction percentage.

## 2021-07-12 NOTE — TELEPHONE ENCOUNTER
Notified patient and daughter echocardiogram results.  They verbalized understanding.  Patient is willing to do a 24-hour Holter monitor to check for heart rates with A. fib.  Patient states she also is going to be scheduled for a sleep study as recommended by her PCP.      An order has been placed for 24-hour Holter monitor, message sent to scheduling.  Patient and daughter had no further questions or concerns at this time.

## 2021-07-12 NOTE — TELEPHONE ENCOUNTER
----- Message from Leo Reece DO sent at 7/9/2021  6:56 AM MDT -----  Good morning,Can you call patient back and let her know that her left ventricular ejection fraction is borderline low.  Given she remains in atrial fibrillation it may be reasonable to perform a 24-hour Holter monitor to ensure her heart rates are adequately controlled as this can lead to reduced heart function if they are left significantly elevated.  Additionally after we have this data we can make another decision about her medical therapy as she may be better served on some medicines other than Cardizem.  Thank you

## 2021-07-13 NOTE — TELEPHONE ENCOUNTER
Notified patient and daughter of results.  They verbalized understanding had no further questions or concerns in regards to iliac artery duplex.     Leo Reece, DO   7/12/2021  5:20 PM MDT       Good morning,    Can we call patient back and let her know that her left common iliac artery stent is patent.  We can follow-up on this annually.  Thank you

## 2021-07-15 ENCOUNTER — ANCILLARY PROCEDURE (OUTPATIENT)
Dept: CARDIOLOGY | Facility: CLINIC | Age: 81
End: 2021-07-15
Payer: MEDICARE

## 2021-07-15 DIAGNOSIS — I48.91 ATRIAL FIBRILLATION AND FLUTTER (CMS/HCC): ICD-10-CM

## 2021-07-15 DIAGNOSIS — I48.92 ATRIAL FIBRILLATION AND FLUTTER (CMS/HCC): ICD-10-CM

## 2021-07-15 PROCEDURE — 93225 XTRNL ECG REC<48 HRS REC: CPT

## 2021-07-19 LAB
RH CV SUPRAVENT % TOTAL BEATS: 0 %
RH CV TOTAL BEATS: NORMAL BEATS
RH CV VENTRICULAR % TOTAL BEATS: 0.05 %
RH SUPRAVENTRICULAR BEATS: 0 BEATS
RH VENTRICULAR BEATS: 61 BEATS

## 2021-07-19 PROCEDURE — 93227 XTRNL ECG REC<48 HR R&I: CPT | Performed by: INTERNAL MEDICINE

## 2021-07-21 ENCOUNTER — ANCILLARY PROCEDURE (OUTPATIENT)
Dept: CARDIOLOGY | Facility: CLINIC | Age: 81
End: 2021-07-21
Payer: MEDICARE

## 2021-07-21 ENCOUNTER — OFFICE VISIT (OUTPATIENT)
Dept: CARDIOLOGY | Facility: CLINIC | Age: 81
End: 2021-07-21
Payer: MEDICARE

## 2021-07-21 VITALS
SYSTOLIC BLOOD PRESSURE: 124 MMHG | DIASTOLIC BLOOD PRESSURE: 78 MMHG | WEIGHT: 174 LBS | HEIGHT: 62 IN | BODY MASS INDEX: 32.02 KG/M2 | HEART RATE: 88 BPM | OXYGEN SATURATION: 96 %

## 2021-07-21 DIAGNOSIS — I73.9 PAD (PERIPHERAL ARTERY DISEASE) (CMS/HCC): ICD-10-CM

## 2021-07-21 DIAGNOSIS — G47.30 SLEEP APNEA, UNSPECIFIED TYPE: ICD-10-CM

## 2021-07-21 DIAGNOSIS — I48.21 PERMANENT ATRIAL FIBRILLATION (CMS/HCC): Primary | ICD-10-CM

## 2021-07-21 LAB
ANTDD1607: NORMAL
ANTDD1844: NORMAL
ANTDS1607: NORMAL
ANTDS1844: NORMAL
ANTPD1607: NORMAL
ANTPD1844: NORMAL
ANTPS1607: NORMAL
ANTPS1844: NORMAL
CFD1844: NORMAL
CFS1844: NORMAL
COMFEMD1607: NORMAL
COMFEMS1607: NORMAL
LEFT ANT TIBIAL SYS PSV: 46 CM/S
LEFT PERONEAL SYS PSV: 103 CM/S
LEFT POST TIBIAL SYS PSV: 113 CM/S
LEFT PROFUNDA SYS PSV: 164 CM/S
LEFT SUPER FEMORAL PROX SYS PSV: 155 CM/S
LEFT TIB/PER TRUNK SYS PSV: 89 CM/S
LL ARTERIAL DUPLEX COMMON FEMORAL PEAK SYS VEL: 145 CM/S
LL ARTERIAL DUPLEX POPLITEAL PEAK SYS VEL: 85 CM/S
PERDD1607: NORMAL
PERDD1844: NORMAL
PERDS1607: NORMAL
PERDS1844: NORMAL
PERPD1607: NORMAL
PERPD1844: NORMAL
PERPS1607: NORMAL
PERPS1844: NORMAL
POPDD1844: NORMAL
POPDISTD1607: NORMAL
POPDISTS1607: NORMAL
POPDS1844: NORMAL
POPPD1844: NORMAL
POPPROXD1607: NORMAL
POPPROXS1607: NORMAL
POPPS1844: NORMAL
POSTTIBDD1607: NORMAL
POSTTIBDS1607: NORMAL
POSTTIBPD1607: NORMAL
POSTTIBPS1607: NORMAL
PROD1844: NORMAL
PROFD1607: NORMAL
PROFS1607: NORMAL
PROS1844: NORMAL
PTDD1844: NORMAL
PTDS1844: NORMAL
PTPD1844: NORMAL
PTPS1844: NORMAL
RH LT LOWER ART ANT TIB DIST: 81 CM/S
RH LT LOWER ART PER DIST: 62 CM/S
RH LT LOWER ART POP DIST: 89 CM/S
RH LT LOWER ART POST TIB DIST: 116 CM/S
RH LT LOWER ART SFA DIST: 129 CM/S
RH LT LOWER ART SFA MID: 135 CM/S
RH RT LOWER ART ANT TIB DIST: 72 CM/S
RH RT LOWER ART ANT TIBIAL SYS PSV: 56 CM/S
RH RT LOWER ART PER DIST: 50 CM/S
RH RT LOWER ART POP DIST: 76 CM/S
RH RT LOWER ART POST TIB DIST: 68 CM/S
RH RT LOWER ART SFA DIST: 64 CM/S
RH RT LOWER ART SFA MID RATIO: 7.8
RH RT LOWER ART SFA MID: 501 CM/S
RIGHT PERONEAL SYS PSV: 64 CM/S
RIGHT POST TIBIAL SYS PSV: 83 CM/S
RIGHT PROFUNDA SYS PSV: 93 CM/S
RIGHT SUPER FEMORAL PROX SYS PSV: 93 CM/S
RIGHT TIB/PER TRUNK SYS PSV: 77 CM/S
RL ARTERIAL DUPLEX COMMON FEMORAL PEAK SYS VEL: 103 CM/S
RL ARTERIAL DUPLEX POPLITEAL PEAK SYS VEL: 60 CM/S
SFADD1607: NORMAL
SFADD1844: NORMAL
SFADS1607: NORMAL
SFADS1844: NORMAL
SFAMD1607: NORMAL
SFAMD1844: NORMAL
SFAMS1607: NORMAL
SFAMS1844: NORMAL
SFAPD1607: NORMAL
SFAPD1844: NORMAL
SFAPS1607: NORMAL
SFAPS1844: NORMAL
TIBD1607: NORMAL
TIBD1844: NORMAL
TIBS1607: NORMAL
TIBS1844: NORMAL

## 2021-07-21 PROCEDURE — G0463 HOSPITAL OUTPT CLINIC VISIT: HCPCS | Performed by: INTERNAL MEDICINE

## 2021-07-21 PROCEDURE — 93880 EXTRACRANIAL BILAT STUDY: CPT

## 2021-07-21 PROCEDURE — 99214 OFFICE O/P EST MOD 30 MIN: CPT | Performed by: INTERNAL MEDICINE

## 2021-07-21 PROCEDURE — 93925 LOWER EXTREMITY STUDY: CPT | Mod: 26 | Performed by: INTERNAL MEDICINE

## 2021-07-21 PROCEDURE — 93925 LOWER EXTREMITY STUDY: CPT

## 2021-07-21 RX ORDER — METOPROLOL SUCCINATE 50 MG/1
50 TABLET, EXTENDED RELEASE ORAL DAILY
Qty: 30 TABLET | Refills: 11 | Status: SHIPPED | OUTPATIENT
Start: 2021-07-21 | End: 2022-07-21 | Stop reason: WASHOUT

## 2021-07-21 ASSESSMENT — PAIN SCALES - GENERAL: PAINLEVEL: 0-NO PAIN

## 2021-07-21 NOTE — PROGRESS NOTES
Cardiology Outpatient Note                                            ABIGAIL French Notes:     Chief Complaint   Patient presents with   • Atrial Fibrillation     1 month follow-up    • Cardiomyopathy   • PAD- Lower       Assessment/ Plan:    Atrial Fibrillation  Permanent afib. Has been for at least 3 years. CHADSVASc 6. TTE from 2017 showed normal biatrial size and EF.  -rate control strategy after meeting with EP  -stop cardizem given EF low normal and will start toprol  -eliquis  -We will likely perform another Holter monitor in the near future, however patient prefers at present to simply check her heart rates with a pulse oximeter several times a day for the moment but will consider doing another Holter monitor  -We will see patient back in 6 months unless clinical change then will  see back sooner    Cardiomyopathy  Suspected nonischemic cardiomyopathy.  EF 50 to 55% on echo 7/2021.  NYHA/AHA 1/C.  -Volume status: Warm and dry on exam  -Diuretic strategy: None needed at present  -We will transition over to Toprol and may consider the use of an ACE inhibitor or ARB, however patient not exceedingly fond of taking more medical therapy.  May be reasonable to repeat a limited echo in 6 months to ensure stability  -May be reasonable in the future also to consider a stress test especially if ejection fraction following or left heart catheterization    History of CVA  Carotid Artery Stenosis  LICA stent on 7/11/17, known occluded VIRI.  Findings stable on repeat duplex 5/2020 with a patent LICA stent and no significant change from prior study.  -repeat duplex in 7/2022    Hypertension  Controlled  -encouraged patient to check BP at home and maintain a log  -maintain <2g salt restricted diet  -Transition to Toprol  -Reviewed with patient how to check blood pressures at home after relaxing for 5 minutes sitting comfortably in a quiet environment.  She will also take her blood pressure cuff with her to a doctor's visit  to ensure that it is calibrated appropriately    PAD  Repeat duplex does show 75 to 99% stenosis in the right SFA following angioplasty and atherectomy on 9/2019.  This has progressed from prior report 11/2019.  -No claudication only leg fatigue.   -Patient had ABIs in [place] and they were normal.  Repeat preliminary lower extremity duplex today shows 75 to 99% right SFA stenosis  -Follow-up with vascular medicine    Dyslipidemia  Repeat lipid panel 6/2021 showed LDL 80, HDL 47, triglycerides 109, non-.  Goal DL less than 70 and non-HDL less than 100.  Lipid panel from 5/2020 showed LDL 55.  Statin intolerant from myalgias.   -Discussed with patient about dietary and lifestyle changes that she states she is kind of fallen off the wagon recently.  She is not interested however in increasing her Praluent to 150 mg and would like to pursue further dietary intervention.  He stated that she is higher risk for recurrent cardiovascular events if left uncontrolled and she accepts risk    H/O DVT  -eliquis    History Upper GI Bleed  Secondary to Lisa Imani tear and had no intervention. Has not had any black or red stools.     Obesity  BMI 32.  -counseled on diet and lifestyle modification    HPI:  Belkis Burris is a 80 y.o. female being seen 07/22/21   for Atrial Fibrillation (1 month follow-up ), Cardiomyopathy, and PAD- Lower     Patient states she is doing well overall.  Denies any dizziness lightheadedness falls syncope palpitations or racing heart.  Has her baseline shortness of breath is worsening.  No orthopnea PND early satiety abdominal fullness.)  The same.  No chest pain or claudication.  Denies any bleeding concerns like black or red stools hematuria or epistaxis.  Does note easy bruising and has been compliant with her aspirin and Eliquis.  Does not wear compression therapy.  Feels her edema has improved since her last visit.  Denies any history of snoring or sleep apnea.  No tobacco or heavy alcohol  use.    With regards to patient's diet she is primarily eating boiled eggs or oatmeal for breakfast.  Nothing for lunch.  For dinner usually having steak tuna salad 6 regular salads she says.  Does snack sometimes in between dinner and bedtime.  Does not take a fiber supplement.  States she is not reading nutrition labels though her daughter has been helping her purchase better food.      CARDIAC PROBLEM LIST:  Primary Cardiologist: Dr. Gallardo      I. CARDIAC  1. Permanent atrial fibrillation  A. On anticoagulation    2. Statin intolerance      II. VASCULAR  1. Carotid artery stenosis  A. Left internal carotid artery stent  B. Right internal carotid artery occlusion    2. PAD  A. Left common iliac stent, left distal SFA DCB (July 2019)  B. Right mid and distal DCB (September 2019)      IV. OTHER  1. Dyslipidemia    Last updated: Addy Cox PA-C 5/1/20     Review of Systems:  The following system(s) were reviewed and negative.  Pertinent positive and negative findings are noted in the HPI.    [x]                     Const                                   [x]                      Eyes   [x]                     ENT                                     [x]                      Resp                                       [x]                     CV                                       [x]                      GI   [x]                                                            [x]                      Neuro   [x]                     Musc                                    [x]                      Skin   [x]                     Psych                                  [x]                      Endo   [x]                     Allergy                                 [x]                      Heme/Lymph.    Histories:  Past Medical History:   Diagnosis Date   • A-fib (CMS/HCC) (HCC)    • A-fib (CMS/HCC) (HCC)    • Cardiovascular disease    • Complication of anesthesia    • Delayed emergence from general anesthesia    •  DVT (deep venous thrombosis) (CMS/HCC) ([facility])    • Dysrhythmia     Afib   • GERD (gastroesophageal reflux disease) 2020   • Hypercholesteremia    • Scoliosis    • TIA (transient ischemic attack)    • Wears dentures     upper   • Wears partial dentures     lower     Past Surgical History:   Procedure Laterality Date   • AIFF ANGIO Bilateral 2019    Procedure: Aiff Angio;  Surgeon: Kailash Gallardo MD;  Location: [facility] Cath Lab;  Service: Peripheral Vascular;  Laterality: Bilateral;  Late case #1 at 0830   • AIFF ANGIO Right 2019    Procedure: Aiff Angio staged;  Surgeon: Kailash Gallardo MD;  Location: [facility] Cath Lab;  Service: Peripheral Vascular;  Laterality: Right;   • ATHERECTOMY - PERIPHERAL N/A 2019    Procedure: Atherectomy - peripheral;  Surgeon: Kailash Gallardo MD;  Location: [facility] Cath Lab;  Service: Peripheral Vascular;  Laterality: N/A;   • ATHERECTOMY - PERIPHERAL N/A 2019    Procedure: Atherectomy - peripheral;  Surgeon: Kailash Gallardo MD;  Location: [facility] Cath Lab;  Service: Peripheral Vascular;  Laterality: N/A;   • CAROTID STENT     • PERIPHERAL ARTERIAL STENT GRAFT N/A 2019    Procedure: Peripheral artery stenting;  Surgeon: Kailash Gallardo MD;  Location: [facility] Cath Lab;  Service: Peripheral Vascular;  Laterality: N/A;   • TUBAL LIGATION       Family History   Problem Relation Age of Onset   • Other Mother         Complications of TB and emphysema, Cause of Death   • Blood Clots Father         Cause of Death   • Arrhythmia Sister    • Other Brother         Carotid endarterectomy     Social History     Occupational History   • Not on file   Tobacco Use   • Smoking status: Former Smoker     Packs/day: 0.50     Years: 10.00     Pack years: 5.00     Types: Cigarettes     Quit date:      Years since quittin.5   • Smokeless tobacco: Never Used   Substance and Sexual Activity   • Alcohol use: No   • Drug use: No   • Sexual activity: Defer   Social History  "Narrative   • Not on file     Social History     Tobacco Use   Smoking Status Former Smoker   • Packs/day: 0.50   • Years: 10.00   • Pack years: 5.00   • Types: Cigarettes   • Quit date:    • Years since quittin.5   Smokeless Tobacco Never Used     Social History     Substance and Sexual Activity   Drug Use No       Medications:   Current Outpatient Medications   Medication Sig Dispense Refill   • aspirin 81 mg EC tablet Take 81 mg by mouth daily     • alirocumab (PRALUENT) 75 mg/mL pen injector Inject 75 mg under the skin every 14 (fourteen) days     • dexlansoprazole (Dexilant) 30 mg capsule Take 30 mg by mouth daily       • Lactobacillus acidophilus (PROBIOTIC) 10 billion cell capsule Take 1 capsule by mouth daily       • apixaban (ELIQUIS) 5 mg tablet Take 5 mg by mouth 2 (two) times a day       • metoprolol succinate XL (TOPROL-XL) 50 mg 24 hr tablet Take 1 tablet (50 mg total) by mouth daily 30 tablet 11   • vit C,E-Zn-copper-lutein-zeaxan (PreserVision AREDS-2) 250-90-40-1 mg capsule Take 1 capsule by mouth 2 (two) times a day       No current facility-administered medications for this visit.       Allergies:  Allergies   Allergen Reactions   • Amoxicillin Itching   • Metronidazole Hives   • Sulfa (Sulfonamide Antibiotics) Itching     I have reviewed and confirmed Belkis Burris's   allergies this visit.     Objective:    Vitals:    21 1508   BP: 124/78   Pulse: 88   SpO2: 96%   Height: 1.575 m (5' 2\")   Weight: 78.9 kg (174 lb)   PainSc: 0-No pain        General:  Appears appropriate for stated age.  Awake, alert, and oriented x3. NAD, obese  Head:  Normocephalic, atraumatic.  Neck:  Supple.  No JVD  Eyes:  Anicteric  Cardiovascular: Irregularly irregular rhythm  Respiratory:  No stridor   Abdominal:  Appears nondistended.    Extremities:  No clubbing, cyanosis.  No edema.    Skin:  No rashes noted.  Neurological:  CN 2 - 12 intact.  No gross sensory or motor deficits noted.  Musculoskeletal:  " Able to move all 4 extremities spontaneously.    Data Review:   Sodium   Date Value Ref Range Status   09/25/2020 134 (L) 135 - 145 mmol/L Final     Potassium   Date Value Ref Range Status   09/25/2020 3.9 3.6 - 5.0 mmol/L Final     Chloride   Date Value Ref Range Status   09/25/2020 93 (L) 98 - 107 mmol/L Final     CO2   Date Value Ref Range Status   09/25/2020 26 21 - 32 mmol/L Final     BUN   Date Value Ref Range Status   09/25/2020 16 7 - 25 mg/dL Final     Creatinine   Date Value Ref Range Status   09/25/2020 0.80 0.60 - 1.10 mg/dL Final     Glucose   Date Value Ref Range Status   09/25/2020 118 (H) 70 - 105 mg/dL Final     Calcium   Date Value Ref Range Status   09/25/2020 9.4 8.6 - 10.1 mg/dL Final     Total CK   Date Value Ref Range Status   05/27/2017 32 26 - 192 IU/L      Troponin I   Date Value Ref Range Status   03/11/2019 <0.030 <0.030 ng/mL Final     BNP   Date Value Ref Range Status   05/30/2017 543 (H) 0 - 99 pg/mL      Lipids:    Lab Results   Component Value Date    CHOL 147 06/07/2021    CHOL 108 05/22/2020    CHOL 285 (A) 11/07/2019     Lab Results   Component Value Date    HDL 47 06/07/2021    HDL 39 05/22/2020    HDL 42 11/07/2019     Lab Results   Component Value Date    LDLCALC 80 06/07/2021    LDLCALC 55 05/22/2020    LDLCALC 206 01/08/2019     Lab Results   Component Value Date    TRIG 109 06/07/2021    TRIG 72 05/22/2020    TRIG 131 11/07/2019        TSH:   Lab Results   Component Value Date    TSH 0.740 06/24/2016     Magnesium:   Lab Results   Component Value Date    MG 1.9 03/12/2019     Protime-INR:   Lab Results   Component Value Date    PT 16.1 (H) 07/07/2017    INR 1.3 (H) 07/07/2017     A1c: No results found for: HGBA1C    Patient Instructions   · Look into getting plant sterols, aged garlic extract (kyolic), increase fiber intake with a fiber supplement and take before meals  · Keep an eye on your heart rate after switching from diltiazem to metoprolol 50mg daily. Get a pulse  oximeter, apple watch, or similar device to monitor heart rate with activity. Stop your cardizem.  · Call [facility] if you have any questions or concerns. If you notice your heart rate >130bpm with activity let us know. Call [facility] at [phone]           Next follow-up with myself or Gal Bird in 6 months    Sincerely,    Leo Reece, DO  07/22/21

## 2021-07-21 NOTE — PATIENT INSTRUCTIONS
· Look into getting plant sterols, aged garlic extract (kyolic), increase fiber intake with a fiber supplement and take before meals  · Keep an eye on your heart rate after switching from diltiazem to metoprolol 50mg daily. Get a pulse oximeter, apple watch, or similar device to monitor heart rate with activity. Stop your cardizem.  · Call [facility] if you have any questions or concerns. If you notice your heart rate >130bpm with activity let us know. Call [facility] at [phone]

## 2021-07-23 ENCOUNTER — TELEPHONE (OUTPATIENT)
Dept: CARDIOLOGY | Facility: CLINIC | Age: 81
End: 2021-07-23

## 2021-07-23 NOTE — TELEPHONE ENCOUNTER
----- Message from Radha Oneal, RN sent at 7/23/2021 10:11 AM MDT -----  If you can't find an opening before the patient goes to [place], we will have to ask Addy where to work her in.  I don't know when she is planning to leave.    ----- Message -----  From: WILDER Coello  Sent: 7/23/2021   9:49 AM MDT  To: , #    I need to see her in follow-up.  She is not scheduled.  Would like to see her before she goes back to [place] to discuss her PAD

## 2021-07-23 NOTE — TELEPHONE ENCOUNTER
I spoke with her and she is scheduled for 9/15 and added to Waitlist.  I will also keep her name in front of me to check for cancellations.  She is not leaving for [place] until sometime in Sept, said she would make it work if she couldn't be seen until scheduled appt

## 2021-07-25 LAB
LEFT CCA DIST DIAS: 30 CM/S
LEFT CCA DIST SYS: 103 CM/S
LEFT CCA MID DIAS: 41 CM/S
LEFT CCA MID SYS: 163 CM/S
LEFT CCA PROX DIAS: 24
LEFT CCA PROX SYS: 94 CM/S
LEFT ECA DIAS: 10 CM/S
LEFT ECA DIAS: 20 CM/S
LEFT ECA SYS: 149 CM/S
LEFT ICA DIST DIAS: 29 CM/S
LEFT ICA DIST SYS: 89 CM/S
LEFT ICA MID DIAS: 35 CM/S
LEFT ICA MID SYS: 97 CM/S
LEFT ICA PROX DIAS: 34 CM/S
LEFT ICA PROX SYS: 90 CM/S
LEFT ICA/CCA SYS: 0.94
LEFT VERTEBRAL DIAS: 15 CM/S
LEFT VERTEBRAL SYS: 46 CM/S
RH LEFT CAROTID BULB EDV: 24 CM/S
RH LEFT CAROTID BULB PSV: 103 CM/S
RH RIGHT CAROTID BULB EDV: 34 CM/S
RH RIGHT CAROTID BULB PSV: 143 CM/S
RIGHT CCA DIST DIAS: 6 CM/S
RIGHT CCA DIST SYS: 18 CM/S
RIGHT CCA MID DIAS: 8 CM/S
RIGHT CCA MID SYS: 29 CM/S
RIGHT CCA PROX DIAS: 5 CM/S
RIGHT CCA PROX SYS: 16 CM/S
RIGHT ECA DIAS: 19 CM/S
RIGHT ECA DIAS: 24 CM/S
RIGHT ECA SYS: 93 CM/S
RIGHT ICA DIST DIAS: 0
RIGHT ICA DIST SYS: 0 CM/S
RIGHT ICA MID DIAS: 0
RIGHT ICA MID SYS: 0 CM/S
RIGHT ICA PROX DIAS: 0
RIGHT ICA PROX SYS: 0 CM/S
RIGHT ICA/CCA SYS: 7.9
RIGHT VERTEBRAL DIAS: 7 CM/S
RIGHT VERTEBRAL SYS: 33 CM/S

## 2021-07-25 PROCEDURE — 93880 EXTRACRANIAL BILAT STUDY: CPT | Mod: 26 | Performed by: INTERNAL MEDICINE

## 2021-08-02 ENCOUNTER — TELEPHONE (OUTPATIENT)
Dept: CARDIOLOGY | Facility: CLINIC | Age: 81
End: 2021-08-02

## 2021-08-02 DIAGNOSIS — I65.23 BILATERAL CAROTID ARTERY STENOSIS: Primary | ICD-10-CM

## 2021-08-02 NOTE — TELEPHONE ENCOUNTER
Patient's daughter, Bee called today to verify medications and see when her mother was taken off metoprolol and put on diltiazem now that she is back on metoprolol. I went through the last 2-3 years of notes with Bee and determined that the patient was put back on metoprolol recently because of her decreased ejection fraction. She previously was on 50mg BID but currently is prescribed 50mg once daily so hopefully this dose will be tolerated better. Bee verbalized understanding and will keep track of patient's heart rate, blood pressure, and symptoms.

## 2021-08-05 ENCOUNTER — ANCILLARY PROCEDURE (OUTPATIENT)
Dept: SLEEP MEDICINE | Facility: HOSPITAL | Age: 81
End: 2021-08-05
Payer: MEDICARE

## 2021-08-05 VITALS — WEIGHT: 178 LBS | HEIGHT: 61 IN | BODY MASS INDEX: 33.61 KG/M2

## 2021-08-05 DIAGNOSIS — G47.30 SLEEP APNEA, UNSPECIFIED TYPE: ICD-10-CM

## 2021-08-05 DIAGNOSIS — I48.21 PERMANENT ATRIAL FIBRILLATION (CMS/HCC): ICD-10-CM

## 2021-08-05 PROCEDURE — 95806 SLEEP STUDY UNATT&RESP EFFT: CPT | Mod: 26 | Performed by: PSYCHIATRY & NEUROLOGY

## 2021-08-05 PROCEDURE — 95806 SLEEP STUDY UNATT&RESP EFFT: CPT

## 2021-08-12 DIAGNOSIS — G47.33 OBSTRUCTIVE SLEEP APNEA SYNDROME: Primary | ICD-10-CM

## 2021-08-13 ENCOUNTER — TELEPHONE (OUTPATIENT)
Dept: NEUROLOGY | Facility: CLINIC | Age: 81
End: 2021-08-13

## 2021-08-16 ENCOUNTER — TELEPHONE (OUTPATIENT)
Dept: CARDIOLOGY | Facility: CLINIC | Age: 81
End: 2021-08-16

## 2021-08-16 NOTE — TELEPHONE ENCOUNTER
Notified patient of results.  She states she did hear from one of the sleep doctors, they are not going to proceed with a formal sleep study as she has mild sleep apnea and does not wish to pursue any further testing at this time.  She will contact sleep medicine if she notices any symptoms.  She had no further questions or concerns at this time.

## 2021-08-16 NOTE — TELEPHONE ENCOUNTER
----- Message from Leo Reece DO sent at 8/16/2021  9:08 AM MDT -----  Good morning,We call patient back and let her know that her sleep study was positive and they are recommending a formal in-house sleep study.  Thank you

## 2022-01-14 NOTE — TELEPHONE ENCOUNTER
Please advise are you okay with filling for 90 and 3?      · Refill requested by: Pharmacy Interface  · Last office visit for Endocrine: 12/29/21  · Next office visit: 3/30/22  · Medication(s) Requested: Levothyroxine  · Last refill: 12/6/21  · Dosage: 150 MCG  · Sig:  Take one tablet by mouth daily.  · Quantity requested: 90  Last TSH result:    TSH (mcUnits/mL)   Date Value   11/18/2021 0.353   ·   · Current with Thyroid labs:  Yes  Refill if seen within the last 12 months  Can not refill without MD authorization       · Refill requested by: Pharmacy Interface  · Last office visit for cholesterol: 12/29/21  · Next office visit: 3/30/22  · Medication(s) Requested: Ezetimibe  · Last refill: 12/6/21  · Dosage: 10mg  · Sig:  Take 1 tablet by mouth daily.  · Quantity requested: 90  Date of last lipid:    Lab Results   Component Value Date    CHOLESTEROL 187 06/22/2021    HDL 53 06/22/2021    CALCLDL 113 06/22/2021    TRIGLYCERIDE 103 06/22/2021   ·   Refill if seen within the last 12 months  Can not refill without MD authorization       · Refill requested by: Pharmacy Interface  · Last office visit for Cardio/Vasodilators: 12/29/21  · Next office visit: 3/30/22  · Medication(s) Requested: Olmesartan  · Last refill: 12/6/21  · Dosage: 20mg  · Sig:  Take 1 tablet by mouth daily  · Quantity requested: 90  Last 2 blood pressure readings:    BP Readings from Last 2 Encounters:   01/02/22 (!) 150/75   12/29/21 128/70     BP<140/90 at last OV/Nurse Visit  Refill if seen within the last 6 months  Can not refill without MD authorization       · Refill requested by: Pharmacy Interface  · Last office visit for Cardio/Vasodilators: 12/29/21  · Next office visit: 3/30/22  · Medication(s) Requested: Amlodipine  · Last refill: 12/6/21  · Dosage: 10mg  · Sig:  Take one tablet by mouth daily  · Quantity requested: 90  Last 2 blood pressure readings:    BP Readings from Last 2 Encounters:   01/02/22 (!) 150/75   12/29/21 128/70  Pt's daughter called echo results. Daughter would like called first [phone] and leave message. Then call pt and talk with her.       BP<140/90 at last OV/Nurse Visit  Refill if seen within the last 6 months  Can not refill without MD authorization

## 2022-05-09 ENCOUNTER — TELEPHONE (OUTPATIENT)
Dept: CARDIOLOGY | Facility: CLINIC | Age: 82
End: 2022-05-09
Payer: MEDICARE

## 2022-05-09 DIAGNOSIS — I48.21 PERMANENT ATRIAL FIBRILLATION (CMS/HCC): Primary | ICD-10-CM

## 2022-05-09 DIAGNOSIS — I73.9 PAD (PERIPHERAL ARTERY DISEASE) (CMS/HCC): ICD-10-CM

## 2022-05-09 NOTE — TELEPHONE ENCOUNTER
Bee the patients daughter called to try and schedule all of the patients appts including testing. At this time the only test that is ordered is an US Carotid. She is questioning is there a need to have the Arterial Duplex, Echo, and Iliac testing done as well again this year and if so can all of those be ordered and scheduled on the same day?

## 2022-05-10 ENCOUNTER — TELEPHONE (OUTPATIENT)
Dept: CARDIOLOGY | Facility: CLINIC | Age: 82
End: 2022-05-10
Payer: MEDICARE

## 2022-05-10 DIAGNOSIS — I42.9 CARDIOMYOPATHY, UNSPECIFIED TYPE (CMS/HCC): Primary | ICD-10-CM

## 2022-05-10 NOTE — TELEPHONE ENCOUNTER
Verbal orders Dr. Reece only to order an limited echocardiogram to be done prior to upcoming visit with Lee Mendoza CNP on 5/26/2022.  See order.

## 2022-05-13 NOTE — TELEPHONE ENCOUNTER
Leo Reece, DO sent to Dilcia Murphy RN  Caller: Unspecified (4 days ago, 10:01 AM)  Yes tell her thank you for calling, if we can have an echo done as well as ABIs with iliac artery duplex and lower extremity arterial duplex that would be great.  It looks like her carotid artery duplex is already ordered.  She should have a repeat echocardiogram given her heart function was low normal last year and she remains in atrial fibrillation which over time can weaken the heart.  Thank     See orders for echocardiogram, bilateral iliac artery duplex with EL, arterial duplex bilateral lower extremity testing.    Called and left voicemail for patient's daughter, Radha, regarding scheduling will be contacting to get scheduled for the above testing.   no

## 2022-05-19 ENCOUNTER — CLINICAL SUPPORT (OUTPATIENT)
Dept: RESEARCH | Facility: OTHER | Age: 82
End: 2022-05-19
Payer: MEDICARE

## 2022-05-19 DIAGNOSIS — Z00.6 RESEARCH SUBJECT: Primary | ICD-10-CM

## 2022-05-19 NOTE — RESEARCH NOTE
Active Trial: CLEAR    Name: Belkis Burris  : 1940  Age:  81 y.o.  Sex: female    Appt. Location: [facility] CLINICAL RESEARCH  [address] [place] [address] [id number] [phone]      Trial Information: A Randomized, Double-blind, Placebo-controlled Study to Assess the Effects of Bempedoic Acid (ETC-1002) on the Occurrence of Major Cardiovascular Events in Patients with, or at high risk for, Cardiovascular Disease who are Statin Intolerant    Bempedoic acid is a first-in-class small molecule inhibitor of adenosine triphosphate (ATP) - citrate lyase (ACL), an enzyme upstream of HMG-CoA reductase (molecular target of statins) in the cholesterol biosynthesis pathway. Bempedoic acid decreases cholesterol synthesis in liver leading to increased LDL receptor (LDLR) expression and LDL particle clearance from the blood. Therefore, inhibition of ACL by [id number] reduces LDL-C via the same pathway as HMG-CoA reductase inhibition by statins.    An important differentiating feature of bempedoic acid is that, unlike statins, it does not inhibit cholesterol synthesis in skeletal muscle. Therefore, it is not expected to cause the adverse effects associated with inhibition of the cholesterol biosynthesis pathway in skeletal muscle.     The primary objective of this trial is to evaluate whether long-term treatment with bempedoic acid 180 mg/day versus placebo reduces the risk of major adverse cardiovascular events (MACE) in patients with, or at high risk for, cardiovascular disease (CVD) who are statin intolerant. This superiority study will test the hypothesis that bempedoic acid, compared with placebo, will reduce the risk of CV events in patients with, or at high risk for, CVD who are statin intolerant.    It is estimated the mean treatment duration will be approximately 42 months (3.5 years), with all patients remaining in the study for a minimum of 24 months (2 years) and some patients  remaining in the study for up to approximately 57.5 months (4.75 years).    If you have questions regarding this study or this subject's participation, call [facility] Clinical Research at [phone] between the hours of 8am-430pm. For emergency information outside of these hours, call KAMARI Lew, CNP (Director of Research) at [phone]     Visit Summary: Medical Record Review only was completed today for End of Study Visit for subject.      Study Drug Compliance:   Patient is compliant with Study drug? N/A      Events:    Adverse Events: none     Serious Adverse Events: none    Outcome Events: none    Labs: NO LABS DRAWN

## 2022-05-25 ENCOUNTER — ANCILLARY PROCEDURE (OUTPATIENT)
Dept: CARDIOLOGY | Facility: CLINIC | Age: 82
End: 2022-05-25
Payer: MEDICARE

## 2022-05-25 PROCEDURE — 93925 LOWER EXTREMITY STUDY: CPT

## 2022-05-25 PROCEDURE — 93922 UPR/L XTREMITY ART 2 LEVELS: CPT

## 2022-05-25 NOTE — PROGRESS NOTES
Cardiology Outpatient Note                                            ABIGAIL French Notes:   No chief complaint on file.      Assessment/ Plan:    Fatigue    · Has been noticing increasing fatigue since moving back from [place].  States that she is unable to do as much as she used to.  No dyspnea on exertion.  No chest pain.  No lightheadedness or dizziness.  No syncope or near syncope.    · ECG demonstrates atrial fibrillation with well-controlled ventricular rate 78 bpm.  Etiology of fatigue may be multifactorial given recent elevation change, deconditioning, possible A. fib with RVR.  Plan at this time is to perform 24-hour Holter monitor to ensure adequate rate control with atrial fibrillation.  Continue to encourage regular routine exercise and lifestyle modification with diet and weight loss.    Cardiomyopathy    • NYHA class II/C.  Euvolemic on exam.  Echocardiogram performed on 7/8/2021 demonstrates EF 50-55%.  Suspect nonischemic.      • Repeat echocardiogram performed 5/26/2022 demonstrates EF 55-60% with improvement in pulmonary hypertension and tricuspid valve regurgitation compared to prior echocardiogram.    • GDMT: Metoprolol succinate.  No diuretic therapy indicated at this time.    • Continue to limit sodium intake 2 g less per day.  Encourage daily weights and to contact our clinic with 3 pound weight gain overnight or 5 pounds in a week.    Permanent atrial fibrillation    • CHADS-VASC score 6, currently on Eliquis.  ECG demonstrates atrial fibrillation with heart rate 78 bpm.    • Continue with metoprolol succinate, apixaban.  As noted above, will repeat 24-hour Holter monitor to ensure adequate rate control.    Hypertension    • Blood pressure well controlled today at 128/72.  Currently on metoprolol succinate.  Continue to limit sodium intake 2 g less per day.  Continue current medication regimen.    Dyslipidemia    • Last lipid panel on 5/2022 demonstrated total cholesterol 108, triglycerides  72, HDL 39, and LDL 55.  Non-HDL 69.    • Currently on Praluent.  Numbers are at goal.  Repeat lipid panel annually.  Managed by PCP.    History of CVA  Carotid artery stenosis    · LICA stent on 7/11/2017 with known occluded VIRI.  Prior duplex 7/21/2021 demonstrated similar findings.    · Repeat duplex performed 5/26/2022 demonstrates no significant change compared to prior study.    · Continue with Praluent and aspirin.  Repeat carotid duplex in 1 year to monitor progression.    PAD    · History of 75-90% stenosis in the right SFA following angioplasty and arthrectomy on 9/2019.  Prior ABIs in [place] noted to be normal.    · Repeat EL and lower extremity duplex performed 5/25/2022 which demonstrated preliminary results 75-99% right SFA stenosis. Preliminary EL suggest right resting EL mildly reduced with normal left resting EL.  Of note, difference in upper extremity blood pressure noted suggesting possible left subclavian artery stenosis.     · No signs or symptoms of intermittent claudication.  No steal syndrome noted.  Could consider left upper extremity duplex to further evaluate possible left subclavian artery stenosis.  Continue with Praluent and aspirin therapy.    History of GI bleed    · Secondary to Lisa-Starks tear and had no intervention.  No recurrent bleeding concerns at this time.    History of DVT    · On Eliqu            HPI:  Belkis Burris is a 81 y.o. female being seen 05/25/22   for No chief complaint on file.     Belkis is a pleasant 81-year-old female who primary follows Dr. Reece.  Past medical history significant for permanent atrial fibrillation, suspected nonischemic cardiomyopathy, history of CVA, carotid artery stenosis, hypertension, peripheral arterial disease, dyslipidemia, history of DVT, and obesity.  She was last evaluated by Dr. Reece on 7/21/2021 for routine follow-up.  At that time she was doing well overall and felt that her baseline shortness of breath was  not progressing.  Recommendations were to continue current medication regimen and to consider periodic Holter monitoring and echocardiogram to ensure rates and EF remained stable.  She presents for her annual follow-up.    Today she tells me she is doing relatively well from a cardiac standpoint, however has been experiencing some increasing fatigue and is noticing that she is not doing as much as she used to.  She did recently moved back from [place] after living there for the past year.  She denies any chest pain or chest pressure.  She denies any significant shortness of breath or dyspnea on exertion.  No lightheadedness or dizziness.  No syncope or near syncope.  She has not experienced any bleeding concerns while on Eliquis.  She has no further questions or concerns.       CARDIAC PROBLEM LIST:  Primary Cardiologist: Dr. Gallardo       I. CARDIAC   1. Permanent atrial fibrillation   A. On anticoagulation     2. Statin intolerance       II. VASCULAR   1. Carotid artery stenosis   A. Left internal carotid artery stent   B. Right internal carotid artery occlusion     2. PAD   A. Left common iliac stent, left distal SFA DCB (July 2019)   B. Right mid and distal DCB (September 2019)       IV. OTHER   1. Dyslipidemia     Last updated: Addy Cox PA-C 8/24/21     Review of Systems:  The following system(s) were reviewed and negative.  Pertinent positive and negative findings are noted in the HPI.    [x]                     Const                                   [x]                      Eyes   [x]                     ENT                                     [x]                      Resp                                       [x]                     CV                                       [x]                      GI   [x]                                                            [x]                      Neuro   [x]                     Musc                                    [x]                      Skin   [x]                      Psych                                  [x]                      Endo   [x]                     Allergy                                 [x]                      Heme/Lymph.    Histories:  Past Medical History:   Diagnosis Date   • A-fib (CMS/HCC) ([facility])    • A-fib (CMS/HCC) ([facility])    • Cardiovascular disease    • Complication of anesthesia    • Delayed emergence from general anesthesia    • DVT (deep venous thrombosis) (CMS/HCC) ([facility])    • Dysrhythmia     Afib   • GERD (gastroesophageal reflux disease) 5/2/2020   • Hypercholesteremia    • Scoliosis    • TIA (transient ischemic attack)    • Wears dentures     upper   • Wears partial dentures     lower     Past Surgical History:   Procedure Laterality Date   • AIFF ANGIO Bilateral 7/24/2019    Procedure: Aiff Angio;  Surgeon: Kailash Gallardo MD;  Location: [facility];  Service: Peripheral Vascular;  Laterality: Bilateral;  Late case #1 at 0830   • AIFF ANGIO Right 9/24/2019    Procedure: Aiff Angio staged;  Surgeon: Kailash Gallardo MD;  Location: [facility];  Service: Peripheral Vascular;  Laterality: Right;   • ATHERECTOMY - PERIPHERAL N/A 7/24/2019    Procedure: Atherectomy - peripheral;  Surgeon: Kailash Gallardo MD;  Location: [facility];  Service: Peripheral Vascular;  Laterality: N/A;   • ATHERECTOMY - PERIPHERAL N/A 9/24/2019    Procedure: Atherectomy - peripheral;  Surgeon: Kailash Gallardo MD;  Location: [facility];  Service: Peripheral Vascular;  Laterality: N/A;   • CAROTID STENT     • PERIPHERAL ARTERIAL STENT GRAFT N/A 7/24/2019    Procedure: Peripheral artery stenting;  Surgeon: Kailash Gallardo MD;  Location: [facility];  Service: Peripheral Vascular;  Laterality: N/A;   • TUBAL LIGATION       Family History   Problem Relation Age of Onset   • Other Mother         Complications of TB and emphysema, Cause of Death   • Blood Clots Father         Cause of Death   • Arrhythmia Sister    • Other Brother          Carotid endarterectomy     Social History     Socioeconomic History   • Marital status:    Tobacco Use   • Smoking status: Former Smoker     Packs/day: 0.50     Years: 10.00     Pack years: 5.00     Types: Cigarettes     Quit date:      Years since quittin.3   • Smokeless tobacco: Never Used   Substance and Sexual Activity   • Alcohol use: No   • Drug use: No   • Sexual activity: Defer     Social Determinants of Health     Tobacco Use: Medium Risk   • Smoking Tobacco Use: Former Smoker   • Smokeless Tobacco Use: Never Used     Social History     Tobacco Use   Smoking Status Former Smoker   • Packs/day: 0.50   • Years: 10.00   • Pack years: 5.00   • Types: Cigarettes   • Quit date:    • Years since quittin.3   Smokeless Tobacco Never Used     Social History     Substance and Sexual Activity   Drug Use No       Medications:   Current Outpatient Medications   Medication Sig Dispense Refill   • metoprolol succinate XL (TOPROL-XL) 50 mg 24 hr tablet Take 1 tablet (50 mg total) by mouth daily 30 tablet 11   • aspirin 81 mg EC tablet Take 81 mg by mouth daily     • vit C,E-Zn-copper-lutein-zeaxan (PreserVision AREDS-2) 250-90-40-1 mg capsule Take 1 capsule by mouth 2 (two) times a day     • alirocumab (PRALUENT) 75 mg/mL pen injector Inject 75 mg under the skin every 14 (fourteen) days     • dexlansoprazole (Dexilant) 30 mg capsule Take 30 mg by mouth daily       • Lactobacillus acidophilus (PROBIOTIC) 10 billion cell capsule Take 1 capsule by mouth daily       • apixaban (ELIQUIS) 5 mg tablet Take 5 mg by mouth 2 (two) times a day         No current facility-administered medications for this visit.       Allergies:  Allergies   Allergen Reactions   • Amoxicillin Itching   • Metronidazole Hives   • Sulfa (Sulfonamide Antibiotics) Itching     I have reviewed and confirmed Belkis Burris's   allergies this visit.     Objective:    There were no vitals filed for this visit.     General:  Awake,  alert, and oriented x3. NAD  Head:  Normocephalic, atraumatic.  Neck:  Supple.  No JVD  Eyes:  Anicteric  Cardiovascular: Irregular regular.  S1-S2 heard.  No murmur  Respiratory:  No stridor, crackles, wheezing  Abdominal:  Appears nondistended.    Extremities:  No clubbing, cyanosis.  No edema.    Skin:  No rashes noted.  Neurological:  CN 2 - 12 intact.  No gross sensory or motor deficits noted.  Musculoskeletal:  Able to move all 4 extremities spontaneously.    Data Review:   Sodium   Date Value Ref Range Status   09/25/2020 134 (L) 135 - 145 mmol/L Final     Potassium   Date Value Ref Range Status   09/25/2020 3.9 3.6 - 5.0 mmol/L Final     Chloride   Date Value Ref Range Status   09/25/2020 93 (L) 98 - 107 mmol/L Final     CO2   Date Value Ref Range Status   09/25/2020 26 21 - 32 mmol/L Final     BUN   Date Value Ref Range Status   09/25/2020 16 7 - 25 mg/dL Final     Creatinine   Date Value Ref Range Status   09/25/2020 0.80 0.60 - 1.10 mg/dL Final     Glucose   Date Value Ref Range Status   09/25/2020 118 (H) 70 - 105 mg/dL Final     Calcium   Date Value Ref Range Status   09/25/2020 9.4 8.6 - 10.1 mg/dL Final     Total CK   Date Value Ref Range Status   05/27/2017 32 26 - 192 IU/L      Troponin I   Date Value Ref Range Status   03/11/2019 <0.030 <0.030 ng/mL Final     BNP   Date Value Ref Range Status   05/30/2017 543 (H) 0 - 99 pg/mL      Lipids:    Lab Results   Component Value Date    CHOL 147 06/07/2021    CHOL 108 05/22/2020    CHOL 285 (A) 11/07/2019     Lab Results   Component Value Date    HDL 47 06/07/2021    HDL 39 05/22/2020    HDL 42 11/07/2019     Lab Results   Component Value Date    LDLCALC 80 06/07/2021    LDLCALC 55 05/22/2020    LDLCALC 206 01/08/2019     Lab Results   Component Value Date    TRIG 109 06/07/2021    TRIG 72 05/22/2020    TRIG 131 11/07/2019        TSH:   Lab Results   Component Value Date    TSH 0.740 06/24/2016     Magnesium:   Lab Results   Component Value Date    MG 1.9  03/12/2019     Protime-INR:   Lab Results   Component Value Date    PT 16.1 (H) 07/07/2017    INR 1.3 (H) 07/07/2017     A1c: No results found for: HGBA1C    There are no Patient Instructions on file for this visit.         Next follow up in 1 year    Sincerely,    Gal Mendoza, CNP  05/25/22

## 2022-05-26 ENCOUNTER — ANCILLARY PROCEDURE (OUTPATIENT)
Dept: CARDIOLOGY | Facility: CLINIC | Age: 82
End: 2022-05-26
Payer: MEDICARE

## 2022-05-26 ENCOUNTER — OFFICE VISIT (OUTPATIENT)
Dept: CARDIOLOGY | Facility: CLINIC | Age: 82
End: 2022-05-26
Payer: MEDICARE

## 2022-05-26 VITALS
SYSTOLIC BLOOD PRESSURE: 128 MMHG | DIASTOLIC BLOOD PRESSURE: 72 MMHG | BODY MASS INDEX: 33.31 KG/M2 | OXYGEN SATURATION: 95 % | HEIGHT: 62 IN | WEIGHT: 181 LBS

## 2022-05-26 VITALS — DIASTOLIC BLOOD PRESSURE: 84 MMHG | SYSTOLIC BLOOD PRESSURE: 134 MMHG

## 2022-05-26 DIAGNOSIS — Z86.73 HISTORY OF CVA (CEREBROVASCULAR ACCIDENT): ICD-10-CM

## 2022-05-26 DIAGNOSIS — I10 PRIMARY HYPERTENSION: ICD-10-CM

## 2022-05-26 DIAGNOSIS — I42.9 CARDIOMYOPATHY, UNSPECIFIED TYPE (CMS/HCC): ICD-10-CM

## 2022-05-26 DIAGNOSIS — Z86.718 HX OF DEEP VENOUS THROMBOSIS: ICD-10-CM

## 2022-05-26 DIAGNOSIS — R53.83 FATIGUE, UNSPECIFIED TYPE: ICD-10-CM

## 2022-05-26 DIAGNOSIS — I65.23 BILATERAL CAROTID ARTERY STENOSIS: ICD-10-CM

## 2022-05-26 DIAGNOSIS — I48.21 PERMANENT ATRIAL FIBRILLATION (CMS/HCC): ICD-10-CM

## 2022-05-26 DIAGNOSIS — Z87.19 HISTORY OF GI BLEED: ICD-10-CM

## 2022-05-26 DIAGNOSIS — I42.0 DILATED CARDIOMYOPATHY (CMS/HCC): Primary | ICD-10-CM

## 2022-05-26 DIAGNOSIS — E78.5 DYSLIPIDEMIA: ICD-10-CM

## 2022-05-26 LAB
ASCENDING AORTA: 2.79 CM
AV LVOT PEAK GRADIENT: 2 MMHG
AV MEAN GRADIENT: 4.43 MMHG
AV PEAK GRADIENT: 7.4 MMHG
DOP CALC AO PEAK VEL: 1.36 M/S
DOP CALC AO VTI: 27.4 CM
DOP CALC LVOT DIAMETER: 1.95 CM
DOP CALC LVOT STROKE VOLUME: 45 CM3
DOP CALC MV VTI: 27.99 CM
DOP CALC RVOT PEAK VEL: 0.4 M/S
DOP CALCLVOT PEAK VEL VTI: 15 CM
E/E' RATIO (AVERAGE): 11.7
E/E' RATIO: 12.7
EJECTION FRACTION: 56 %
ERAP: 5 MMHG
INTERVENTRICULAR SEPTUM: 1 CM (ref 0.6–1.1)
IVC PROX: 1.26 CM
LA AREA A4C SYSTOLE: 58 CM3
LEFT ATRIUM SIZE: 3.59 CM
LEFT ATRIUM VOLUME INDEX: 33 ML/M2
LEFT ATRIUM VOLUME: 60 CM3
LEFT CCA DIST DIAS: 33 CM/S
LEFT CCA DIST SYS: 116 CM/S
LEFT CCA MID DIAS: 20 CM/S
LEFT CCA MID SYS: 82 CM/S
LEFT CCA PROX SYS: 93 CM/S
LEFT ECA DIAS: 36 CM/S
LEFT ECA SYS: 161 CM/S
LEFT ICA DIST DIAS: 38 CM/S
LEFT ICA DIST SYS: 121 CM/S
LEFT ICA MID DIAS: 38 CM/S
LEFT ICA MID SYS: 110 CM/S
LEFT ICA PROX DIAS: 30 CM/S
LEFT ICA PROX SYS: 96 CM/S
LEFT ICA/CCA SYS: 1
LEFT INTERNAL DIMENSION IN SYSTOLE: 4.1 CM (ref 2.1–4)
LEFT VENTRICLE DIASTOLIC VOLUME: 42 CM3
LEFT VENTRICLE SYSTOLIC VOLUME: 19 CM3
LEFT VENTRICULAR INTERNAL DIMENSION IN DIASTOLE: 5.5 CM (ref 3.5–6)
LEFT VERTEBRAL DIAS: 17 CM/S
LEFT VERTEBRAL SYS: 49 CM/S
LVAD-AP2: 16 CM2
MV DEC SLOPE: 4110 MM/S2
MV DT: 197 MS
MV MEAN GRADIENT: 2.8 MMHG
MV PEAK E VEL: 99.7 CM/S
MV PEAK GRADIENT: 7 MMHG
MV STENOSIS PRESSURE HALF TIME: 91 MS
MV VALVE AREA P 1/2 METHOD: 2.42 CM2
MV VMAX: 132 CM/S
MVA (VTI): 1.6 CM2
POSTERIOR WALL: 1 CM (ref 0.6–1.1)
PULM VEIN S/D RATIO: 0.7
PV PEAK D VEL: 46 CM/S
PV PEAK GRADIENT: 5.57 MMHG
PV PEAK S VEL: 33 CM/S
PV VMAX: 1.18 M/S
RA AREA: 17.8 CM2
RH CV ECHO AV VALVE AREA VEL: 1.5 CM2
RH CV ECHO AV VALVE AREA VTI: 1.6 CM2
RH LEFT CAROTID BULB EDV: 27 CM/S
RH LEFT CAROTID BULB PSV: 114 CM/S
RH LVOT PEAK VELOCITY REST: 0.7 M/S
RH RIGHT CAROTID BULB EDV: 108 CM/S
RH RIGHT CAROTID BULB PSV: 449 CM/S
RH RIGHT CAROTID BULB/CCA: 7.5
RIGHT CCA DIST DIAS: 19 CM/S
RIGHT CCA DIST SYS: 60 CM/S
RIGHT CCA MID DIAS: 11 CM/S
RIGHT CCA MID SYS: 29 CM/S
RIGHT CCA PROX DIAS: 7 CM/S
RIGHT CCA PROX SYS: 18 CM/S
RIGHT ECA DIAS: 6 CM/S
RIGHT ECA SYS: 32 CM/S
RIGHT ICA DIST SYS: 0
RIGHT ICA MID SYS: 0
RIGHT ICA PROX SYS: 0
RIGHT VERTEBRAL DIAS: 8 CM/S
RIGHT VERTEBRAL SYS: 56 CM/S
RV AP4 BASE: 3.2 CM
S': 8 CM/S
TDI: 7.8 CM/S
TDILATERAL: 9.3 CM/S
TR MAX PG: 37.7 MMHG
TRICUSPID ANNULAR PLANE SYSTOLIC EXCURSION: 1.3 CM
TRICUSPID VALVE PEAK REGURGITATION VELOCITY: 3.07 M/S
TV REST PULMONARY ARTERY PRESSURE: 43 MMHG

## 2022-05-26 PROCEDURE — 93010 ELECTROCARDIOGRAM REPORT: CPT | Performed by: INTERNAL MEDICINE

## 2022-05-26 PROCEDURE — 93880 EXTRACRANIAL BILAT STUDY: CPT

## 2022-05-26 PROCEDURE — 93005 ELECTROCARDIOGRAM TRACING: CPT | Performed by: NURSE PRACTITIONER

## 2022-05-26 PROCEDURE — 93978 VASCULAR STUDY: CPT

## 2022-05-26 PROCEDURE — 93306 TTE W/DOPPLER COMPLETE: CPT

## 2022-05-26 PROCEDURE — 99213 OFFICE O/P EST LOW 20 MIN: CPT | Performed by: NURSE PRACTITIONER

## 2022-05-26 PROCEDURE — G0463 HOSPITAL OUTPT CLINIC VISIT: HCPCS | Performed by: NURSE PRACTITIONER

## 2022-05-26 PROCEDURE — 93306 TTE W/DOPPLER COMPLETE: CPT | Mod: 26 | Performed by: INTERNAL MEDICINE

## 2022-05-26 PROCEDURE — 93880 EXTRACRANIAL BILAT STUDY: CPT | Mod: 26 | Performed by: INTERNAL MEDICINE

## 2022-05-26 ASSESSMENT — PAIN SCALES - GENERAL: PAINLEVEL: 0-NO PAIN

## 2022-05-27 DIAGNOSIS — I65.23 BILATERAL CAROTID ARTERY STENOSIS: Primary | ICD-10-CM

## 2022-06-02 PROCEDURE — 93922 UPR/L XTREMITY ART 2 LEVELS: CPT | Mod: 26 | Performed by: INTERNAL MEDICINE

## 2022-06-02 PROCEDURE — 93925 LOWER EXTREMITY STUDY: CPT | Mod: 26 | Performed by: INTERNAL MEDICINE

## 2022-06-02 PROCEDURE — 93978 VASCULAR STUDY: CPT | Mod: 26 | Performed by: INTERNAL MEDICINE

## 2022-06-10 ENCOUNTER — ANCILLARY PROCEDURE (OUTPATIENT)
Dept: CARDIOLOGY | Facility: CLINIC | Age: 82
End: 2022-06-10
Payer: MEDICARE

## 2022-06-10 DIAGNOSIS — R53.83 FATIGUE, UNSPECIFIED TYPE: ICD-10-CM

## 2022-06-10 DIAGNOSIS — I48.21 PERMANENT ATRIAL FIBRILLATION (CMS/HCC): ICD-10-CM

## 2022-06-10 PROCEDURE — 93225 XTRNL ECG REC<48 HRS REC: CPT

## 2022-06-13 LAB
RH CV SUPRAVENT % TOTAL BEATS: 0 %
RH CV TOTAL BEATS: NORMAL BEATS
RH CV VENTRICULAR % TOTAL BEATS: 0 %
RH SUPRAVENTRICULAR BEATS: 0 BEATS
RH VENTRICULAR BEATS: 6 BEATS

## 2022-06-13 PROCEDURE — 93227 XTRNL ECG REC<48 HR R&I: CPT | Performed by: INTERNAL MEDICINE

## 2022-06-20 ENCOUNTER — CLINICAL SUPPORT (OUTPATIENT)
Dept: RESEARCH | Facility: OTHER | Age: 82
End: 2022-06-20
Payer: MEDICARE

## 2022-06-20 DIAGNOSIS — Z00.6 RESEARCH SUBJECT: ICD-10-CM

## 2022-06-20 PROCEDURE — 3700 RSCH CLEAR 10% HOLDBACK: Performed by: NURSE PRACTITIONER

## 2022-06-20 PROCEDURE — 3700 RSCH CLEAR PT1 PHONE CALL 30 DAYS AFTER END OF STUDY: Performed by: NURSE PRACTITIONER

## 2022-06-20 NOTE — RESEARCH NOTE
Active Trial: Clear    Name: Belkis Burris  : 1940  Age:  81 y.o.  Sex: female    Appt. Location: [facility]  [address] [place] [address] [id number] [phone]      Trial Information:   A Randomized, Double-blind, Placebo-controlled Study to Assess the Effects of Bempedoic Acid (ETC-1002) on the Occurrence of Major Cardiovascular Events in Patients with, or at high risk for, Cardiovascular Disease who are Statin Intolerant    Bempedoic acid is a first-in-class small molecule inhibitor of adenosine triphosphate (ATP) - citrate lyase (ACL), an enzyme upstream of HMG-CoA reductase (molecular target of statins) in the cholesterol biosynthesis pathway. Bempedoic acid decreases cholesterol synthesis in liver leading to increased LDL receptor (LDLR) expression and LDL particle clearance from the blood. Therefore, inhibition of ACL by [id number] reduces LDL-C via the same pathway as HMG-CoA reductase inhibition by statins.    An important differentiating feature of bempedoic acid is that, unlike statins, it does not inhibit cholesterol synthesis in skeletal muscle. Therefore, it is not expected to cause the adverse effects associated with inhibition of the cholesterol biosynthesis pathway in skeletal muscle.     The primary objective of this trial is to evaluate whether long-term treatment with bempedoic acid 180 mg/day versus placebo reduces the risk of major adverse cardiovascular events (MACE) in patients with, or at high risk for, cardiovascular disease (CVD) who are statin intolerant. This superiority study will test the hypothesis that bempedoic acid, compared with placebo, will reduce the risk of CV events in patients with, or at high risk for, CVD who are statin intolerant.    It is estimated the mean treatment duration will be approximately 42 months (3.5 years), with all patients remaining in the study for a minimum of 24 months (2 years) and some  patients remaining in the study for up to approximately 57.5 months (4.75 years).    If you have questions regarding this study or this subject's participation, call [facility] Clinical Research at [phone] between the hours of 8am-430pm. For emergency information outside of these hours, call KAMARI Lew, CNP (Director of Research) at [phone] or Ita Adame MS (Manager of Research) at [phone].      Visit Summary:   Medical Record Review only was completed today for 30 day post End of Study Visit for subject.      Study Drug Compliance:   Patient is compliant with Study drug? N/A      Events:    Adverse Events: None Reported    Serious Adverse Events: None Reported    Outcome Events: None Reported

## 2022-10-03 ENCOUNTER — OFFICE VISIT (OUTPATIENT)
Dept: URBAN - METROPOLITAN AREA CLINIC 82 | Facility: CLINIC | Age: 82
End: 2022-10-03
Payer: MEDICARE

## 2022-10-03 DIAGNOSIS — H35.3221 EXDTVE AGE-REL MCLR DEGN, LEFT EYE, WITH ACTV CHRDL NEOVAS: Primary | ICD-10-CM

## 2022-10-03 DIAGNOSIS — H35.3114 NEXDTVE AGE-REL MCLR DEGN, R EYE, ADV ATRPC WITH SBFVL INVL: ICD-10-CM

## 2022-10-03 DIAGNOSIS — H25.13 AGE-RELATED NUCLEAR CATARACT, BILATERAL: ICD-10-CM

## 2022-10-03 PROCEDURE — 99204 OFFICE O/P NEW MOD 45 MIN: CPT | Performed by: OPTOMETRIST

## 2022-10-03 PROCEDURE — 92082 INTERMEDIATE VISUAL FIELD XM: CPT | Performed by: OPTOMETRIST

## 2022-10-03 ASSESSMENT — VISUAL ACUITY: OS: 20/200

## 2022-10-03 ASSESSMENT — KERATOMETRY
OS: 43.25
OD: 43.25

## 2022-10-03 ASSESSMENT — INTRAOCULAR PRESSURE
OD: 12
OS: 15

## 2022-10-03 NOTE — IMPRESSION/PLAN
Impression: Exdtve age-rel mclr degn, left eye, with actv chrdl neovas: H35.3221. Mac OCT showed PED/edema OS, atrophy OD  Plan: Discussed finding and risks of untreated Wet ARMD.  Discussed importance of referral to retinal specialist for evaluation urgently and determination of whether treatment is appropriate. Dispensed amsler grid to patient to monitor for changes. RTC asap if sudden changes in vision noted.

## 2022-10-03 NOTE — IMPRESSION/PLAN
Impression: Age-related nuclear cataract, bilateral: H25.13. Plan: Discussed cataract findings. Discussed option of ce/iol OU. Recommended cataract surgery. Patient desires to have surgery. Recommend CE w/IOL consult with Dr. Marko Ho, or Dr. Carson Isaacs. 

Discussed macular findings with patient, educated pt will likely need to see retina to obtain approval for surgery. Discussed macular findings potentially limiting visual outcome, pt expressed understanding.

## 2022-10-03 NOTE — IMPRESSION/PLAN
Impression: Nexdtve age-rel mclr degn, r eye, adv atrpc with sbfvl invl: X61.2946. Plan: See plan 1.

## 2022-10-14 ENCOUNTER — OFFICE VISIT (OUTPATIENT)
Dept: URBAN - METROPOLITAN AREA CLINIC 13 | Facility: CLINIC | Age: 82
End: 2022-10-14
Payer: MEDICARE

## 2022-10-14 DIAGNOSIS — H25.13 AGE-RELATED NUCLEAR CATARACT, BILATERAL: ICD-10-CM

## 2022-10-14 DIAGNOSIS — H35.3114 NONEXUDATIVE AGE-RELATED MACULAR DEGENERATION, RIGHT EYE, ADVANCED ATROPHIC WITH SUBFOVEAL INVOLVEMENT: ICD-10-CM

## 2022-10-14 DIAGNOSIS — H35.3221 EXUDATIVE AGE-RELATED MACULAR DEGENERATION, LEFT EYE, WITH ACTIVE CHOROIDAL NEOVASCULARIZATION: Primary | ICD-10-CM

## 2022-10-14 DIAGNOSIS — H43.813 VITREOUS DEGENERATION, BILATERAL: ICD-10-CM

## 2022-10-14 PROCEDURE — 92235 FLUORESCEIN ANGRPH MLTIFRAME: CPT | Performed by: OPHTHALMOLOGY

## 2022-10-14 PROCEDURE — 99204 OFFICE O/P NEW MOD 45 MIN: CPT | Performed by: OPHTHALMOLOGY

## 2022-10-14 PROCEDURE — 67028 INJECTION EYE DRUG: CPT | Performed by: OPHTHALMOLOGY

## 2022-10-14 PROCEDURE — 92134 CPTRZ OPH DX IMG PST SGM RTA: CPT | Performed by: OPHTHALMOLOGY

## 2022-10-14 ASSESSMENT — INTRAOCULAR PRESSURE
OS: 15
OD: 15

## 2022-10-14 NOTE — IMPRESSION/PLAN
Impression: Nonexudative age-related macular degeneration, right eye, advanced atrophic with subfoveal involvement: H35.3114. Plan: --no evidence of exudation on exam or OCT
--geographic RPE atrophy will limit visual potential
--findings/diagnosis discussed with pt in detail
--AREDS-2 vitamins and Amsler grid self-monitoring reviewed
--dietary recs, avoid smoking, UV protection discussed --pt to call with s/s decreased vision/metamorphopsia

## 2022-10-14 NOTE — IMPRESSION/PLAN
Impression: Age-related nuclear cataract, bilateral: H25.13.  Plan: --hold CE/IOL OS for now until AMD stable
--CE/IOL OD unlikely to improve central vision

## 2022-10-14 NOTE — IMPRESSION/PLAN
Impression: Exudative age-related macular degeneration, left eye, with active choroidal neovascularization: H35.9533. Plan: --exam/OCT reveal new-onset SRF secondary to CNV OS
--FA shows staining of drusen and occult submacular leakage --findings/diagnosis d/w patient in detail --rec initiation of anti-VEGF therapy to maintain vision  
--r/b/a of Avastin, Lucentis, Eylea, and Vabysmo discussed --pt understands Avastin is considered off-label for NVAMD
--pt elects to initiate intravitreal Eylea 2 mg/.05 ml
--injection #1 performed w/o complication, overfill discarded
--plan series of 3 monthly injections to stabilize CNV
--pt instructed to call immediately with s/s VA loss/pain RTC 4-6 weeks for Eylea OS 2/3

## 2022-11-14 ENCOUNTER — PROCEDURE (OUTPATIENT)
Dept: URBAN - METROPOLITAN AREA CLINIC 83 | Facility: CLINIC | Age: 82
End: 2022-11-14
Payer: MEDICARE

## 2022-11-14 DIAGNOSIS — H35.3221 EXUDATIVE AGE-RELATED MACULAR DEGENERATION, LEFT EYE, WITH ACTIVE CHOROIDAL NEOVASCULARIZATION: Primary | ICD-10-CM

## 2022-11-14 PROCEDURE — 67028 INJECTION EYE DRUG: CPT | Performed by: OPHTHALMOLOGY

## 2022-11-14 ASSESSMENT — INTRAOCULAR PRESSURE
OD: 18
OS: 15

## 2022-12-02 ENCOUNTER — TELEPHONE (OUTPATIENT)
Dept: CARDIOLOGY | Facility: CLINIC | Age: 82
End: 2022-12-02
Payer: MEDICARE

## 2022-12-02 NOTE — TELEPHONE ENCOUNTER
Message left for Dr Phil Huerta's nurse at [facility], that Eliquis is 5 mg BID

## 2022-12-02 NOTE — TELEPHONE ENCOUNTER
Shyann would like a call back about a medication dosage for this pt's eliquis.     Shyann's call back number is [phone] ext [phone].

## 2022-12-05 NOTE — TELEPHONE ENCOUNTER
Deanna leaves message.  Dr Villarreal would like provider's verification to continue Eliquis 5 mg BID rather than decrease to 2.5 mg BID.

## 2022-12-05 NOTE — TELEPHONE ENCOUNTER
Message left for Deanna.  I also requested her to fax most recent metabolic panel, CBC and lipid panel.

## 2022-12-05 NOTE — TELEPHONE ENCOUNTER
Gal Mendoza, CNP  Yes, appropriate therapy would be apixaban 5 mg twice daily based on her kidney function and weight.  However, BMP from our records are 2 years old.  If creatinine was greater than 1.5 then based on age and kidney function she would require the 2.5 mg twice daily dosing.

## 2022-12-08 ENCOUNTER — TELEPHONE (OUTPATIENT)
Dept: CARDIOLOGY | Facility: CLINIC | Age: 82
End: 2022-12-08
Payer: MEDICARE

## 2022-12-08 NOTE — TELEPHONE ENCOUNTER
Pt called because she was notified by [facility] that there were lab orders to be done. Pt is currently in [place] and would be glad to complete the labwork there but will need the orders sent.

## 2022-12-08 NOTE — TELEPHONE ENCOUNTER
I called patient back and she stated she was advised by [facility] to give us a call regarding labs. I advised her we were requesting labs because her doctor asked if her apixaban could be decreased. The recommendation was for the patient to have recent labs sent over as the last labs we had are from 2020. I advised patient I would request the labs and go from there. Patient verbalized understanding.

## 2022-12-09 ENCOUNTER — TELEPHONE (OUTPATIENT)
Dept: CARDIOLOGY | Facility: CLINIC | Age: 82
End: 2022-12-09
Payer: MEDICARE

## 2022-12-09 NOTE — TELEPHONE ENCOUNTER
I called Bee back and she asked if her mother's medication is going to be decreased as suggested per Dr. Villarreal's office.  I let her know that labs were requested from her mother's primary care provider.  Gal Mendoza DNP will have review them and make further recommendations. Bee verbalized understanding.

## 2022-12-09 NOTE — TELEPHONE ENCOUNTER
PT's daughter would like a call back in regard to her medications. She would like to know if there is a med change or if she should continue her meds as is until she sees [facility] in 05/2023.

## 2022-12-16 ENCOUNTER — TELEPHONE (OUTPATIENT)
Dept: CARDIOLOGY | Facility: CLINIC | Age: 82
End: 2022-12-16
Payer: MEDICARE

## 2022-12-16 NOTE — TELEPHONE ENCOUNTER
I spoke with Gal Mendoza DNP regarding this patient.  Looks like patient's primary care office called asking if apixaban could be decreased to 2.5 mg twice daily instead of 5 mg twice daily.  Gal Mendoza DNP recommended recent labs.  Patient had not had labs since 2020.  Labs were received from May 2022. Gal Mendoza DNP recommended that the patient stay on her current dose of apixaban.     I called Isadora back to advise her of the recommendation.  She verbalized understanding.

## 2022-12-19 ENCOUNTER — OFFICE VISIT (OUTPATIENT)
Dept: URBAN - METROPOLITAN AREA CLINIC 83 | Facility: CLINIC | Age: 82
End: 2022-12-19
Payer: MEDICARE

## 2022-12-19 DIAGNOSIS — H35.3221 EXUDATIVE AGE-RELATED MACULAR DEGENERATION, LEFT EYE, WITH ACTIVE CHOROIDAL NEOVASCULARIZATION: Primary | ICD-10-CM

## 2022-12-19 PROCEDURE — 67028 INJECTION EYE DRUG: CPT | Performed by: OPHTHALMOLOGY

## 2022-12-19 ASSESSMENT — INTRAOCULAR PRESSURE
OD: 15
OS: 13

## 2023-01-30 ENCOUNTER — PROCEDURE (OUTPATIENT)
Facility: LOCATION | Age: 83
End: 2023-01-30
Payer: MEDICARE

## 2023-01-30 DIAGNOSIS — H35.3221 EXUDATIVE AGE-RELATED MACULAR DEGENERATION, LEFT EYE, WITH ACTIVE CHOROIDAL NEOVASCULARIZATION: Primary | ICD-10-CM

## 2023-01-30 DIAGNOSIS — H25.13 AGE-RELATED NUCLEAR CATARACT, BILATERAL: ICD-10-CM

## 2023-01-30 DIAGNOSIS — H43.813 VITREOUS DEGENERATION, BILATERAL: ICD-10-CM

## 2023-01-30 DIAGNOSIS — H35.3114 NONEXUDATIVE AGE-RELATED MACULAR DEGENERATION, RIGHT EYE, ADVANCED ATROPHIC WITH SUBFOVEAL INVOLVEMENT: ICD-10-CM

## 2023-01-30 PROCEDURE — 67028 INJECTION EYE DRUG: CPT | Performed by: OPHTHALMOLOGY

## 2023-01-30 PROCEDURE — 92134 CPTRZ OPH DX IMG PST SGM RTA: CPT | Performed by: OPHTHALMOLOGY

## 2023-01-30 ASSESSMENT — INTRAOCULAR PRESSURE
OS: 17
OD: 17

## 2023-01-30 NOTE — IMPRESSION/PLAN
Impression: Exudative age-related macular degeneration, left eye, with active choroidal neovascularization: H35.3221.
s/p Eylea x 3, last 12/19/22 Plan: --exam/OCT reveal resolving SRF with anti-VEGF treatment
--findings d/w pt, rec cont anti-VEGF, treat and extend --r/b/a of Eylea for NVAMD discussed --pt elects to cont Eylea under OCT guidance --Eylea 2 mg/.05 ml injected w/o complication --pt instructed to call immediately with s/s VA loss/pain RTC 8 weeks for OCT and Eylea OS (treat and extend)

## 2023-01-30 NOTE — IMPRESSION/PLAN
Impression: Age-related nuclear cataract, bilateral: H25.13.  Plan: --cleared for CE/IOL OS at this time
--CE/IOL OD unlikely to improve vision

## 2023-03-27 ENCOUNTER — OFFICE VISIT (OUTPATIENT)
Facility: LOCATION | Age: 83
End: 2023-03-27
Payer: MEDICARE

## 2023-03-27 DIAGNOSIS — H43.813 VITREOUS DEGENERATION, BILATERAL: ICD-10-CM

## 2023-03-27 DIAGNOSIS — H35.3114 NONEXUDATIVE AGE-RELATED MACULAR DEGENERATION, RIGHT EYE, ADVANCED ATROPHIC WITH SUBFOVEAL INVOLVEMENT: ICD-10-CM

## 2023-03-27 DIAGNOSIS — H25.13 AGE-RELATED NUCLEAR CATARACT, BILATERAL: ICD-10-CM

## 2023-03-27 DIAGNOSIS — H35.3221 EXUDATIVE AGE-RELATED MACULAR DEGENERATION, LEFT EYE, WITH ACTIVE CHOROIDAL NEOVASCULARIZATION: Primary | ICD-10-CM

## 2023-03-27 PROCEDURE — 67028 INJECTION EYE DRUG: CPT | Performed by: OPHTHALMOLOGY

## 2023-03-27 PROCEDURE — 92134 CPTRZ OPH DX IMG PST SGM RTA: CPT | Performed by: OPHTHALMOLOGY

## 2023-03-27 PROCEDURE — 92014 COMPRE OPH EXAM EST PT 1/>: CPT | Performed by: OPHTHALMOLOGY

## 2023-03-27 ASSESSMENT — INTRAOCULAR PRESSURE
OS: 16
OD: 16

## 2023-03-27 NOTE — IMPRESSION/PLAN
Impression: Nonexudative age-related macular degeneration, right eye, advanced atrophic with subfoveal involvement: H35.3114. Plan: --no evidence of exudation on exam or OCT
--diffuse GA will limit visual potential
--findings/diagnosis discussed with pt in detail
--AREDS-2 vitamins and Amsler grid self-monitoring reviewed
--dietary recs, avoid smoking, UV protection discussed --pt to call with s/s decreased vision/metamorphopsia

## 2023-03-27 NOTE — IMPRESSION/PLAN
Impression: Exudative age-related macular degeneration, left eye, with active choroidal neovascularization: H35.3221.
s/p Eylea x 4, last 01/30/23 Plan: --exam/OCT reveal stable CNV, resolved SRF, s/p Eylea 10 wks ago
--findings d/w pt, rec cont anti-VEGF, cont treat and extend --r/b/a of Eylea for NVAMD discussed --pt elects to cont Eylea under OCT guidance --Eylea 2 mg/.05 ml injected w/o complication --pt instructed to call immediately with s/s VA loss/pain RTC 10 weeks - [address]

## 2023-04-19 DIAGNOSIS — I65.23 BILATERAL CAROTID ARTERY STENOSIS: Primary | ICD-10-CM

## 2023-04-19 DIAGNOSIS — I73.9 PVD (PERIPHERAL VASCULAR DISEASE) (CMS/HCC): ICD-10-CM

## 2023-04-19 DIAGNOSIS — I70.203 ATHEROSCLEROSIS OF NATIVE ARTERY OF BOTH LOWER EXTREMITIES, WITH UNSPECIFIED PRESENCE OF CLINICAL MANIFESTATION (CMS/HCC): ICD-10-CM

## 2023-06-21 DIAGNOSIS — I48.21 PERMANENT ATRIAL FIBRILLATION (CMS/HCC): Primary | ICD-10-CM

## 2023-06-28 ENCOUNTER — ANCILLARY PROCEDURE (OUTPATIENT)
Dept: CARDIOLOGY | Facility: CLINIC | Age: 83
End: 2023-06-28
Payer: MEDICARE

## 2023-06-28 ENCOUNTER — LAB (OUTPATIENT)
Dept: LAB | Facility: CLINIC | Age: 83
End: 2023-06-28
Payer: MEDICARE

## 2023-06-28 ENCOUNTER — OFFICE VISIT (OUTPATIENT)
Dept: CARDIOLOGY | Facility: CLINIC | Age: 83
End: 2023-06-28
Payer: MEDICARE

## 2023-06-28 VITALS
HEIGHT: 62 IN | WEIGHT: 184.2 LBS | DIASTOLIC BLOOD PRESSURE: 94 MMHG | SYSTOLIC BLOOD PRESSURE: 140 MMHG | BODY MASS INDEX: 33.9 KG/M2 | HEART RATE: 80 BPM | OXYGEN SATURATION: 96 %

## 2023-06-28 DIAGNOSIS — R06.09 DOE (DYSPNEA ON EXERTION): ICD-10-CM

## 2023-06-28 DIAGNOSIS — E78.5 DYSLIPIDEMIA: ICD-10-CM

## 2023-06-28 DIAGNOSIS — I73.9 PVD (PERIPHERAL VASCULAR DISEASE) (CMS/HCC): ICD-10-CM

## 2023-06-28 DIAGNOSIS — I70.203 ATHEROSCLEROSIS OF NATIVE ARTERY OF BOTH LOWER EXTREMITIES, WITH UNSPECIFIED PRESENCE OF CLINICAL MANIFESTATION (CMS/HCC): ICD-10-CM

## 2023-06-28 DIAGNOSIS — I65.23 BILATERAL CAROTID ARTERY STENOSIS: ICD-10-CM

## 2023-06-28 DIAGNOSIS — I48.21 PERMANENT ATRIAL FIBRILLATION (CMS/HCC): ICD-10-CM

## 2023-06-28 DIAGNOSIS — I48.21 PERMANENT ATRIAL FIBRILLATION (CMS/HCC): Primary | ICD-10-CM

## 2023-06-28 LAB
LEFT CCA DIST DIAS: 36 CM/S
LEFT CCA DIST SYS: 120 CM/S
LEFT CCA MID DIAS: 27 CM/S
LEFT CCA MID SYS: 121 CM/S
LEFT CCA PROX SYS: 113 CM/S
LEFT ECA SYS: 220 CM/S
LEFT ICA DIST DIAS: 37 CM/S
LEFT ICA DIST SYS: 105 CM/S
LEFT ICA MID DIAS: 29 CM/S
LEFT ICA MID SYS: 78 CM/S
LEFT ICA PROX DIAS: 31 CM/S
LEFT ICA PROX SYS: 97 CM/S
LEFT VERTEBRAL DIAS: 21 CM/S
LEFT VERTEBRAL SYS: 48 CM/S
RH LEFT CAROTID BULB EDV: 36 CM/S
RH LEFT CAROTID BULB PSV: 125 CM/S
RH RIGHT CAROTID BULB EDV: 17 CM/S
RH RIGHT CAROTID BULB PSV: 86 CM/S
RIGHT CCA DIST DIAS: 19 CM/S
RIGHT CCA DIST SYS: 66 CM/S
RIGHT CCA MID SYS: 0
RIGHT CCA PROX SYS: 0
RIGHT ECA SYS: 23 CM/S
RIGHT ICA PROX SYS: 0
RIGHT VERTEBRAL DIAS: 12 CM/S
RIGHT VERTEBRAL SYS: 46 CM/S

## 2023-06-28 PROCEDURE — 93880 EXTRACRANIAL BILAT STUDY: CPT

## 2023-06-28 PROCEDURE — 93978 VASCULAR STUDY: CPT

## 2023-06-28 PROCEDURE — 93922 UPR/L XTREMITY ART 2 LEVELS: CPT

## 2023-06-28 PROCEDURE — G0463 HOSPITAL OUTPT CLINIC VISIT: HCPCS | Performed by: STUDENT IN AN ORGANIZED HEALTH CARE EDUCATION/TRAINING PROGRAM

## 2023-06-28 PROCEDURE — 93925 LOWER EXTREMITY STUDY: CPT

## 2023-06-28 PROCEDURE — 99214 OFFICE O/P EST MOD 30 MIN: CPT | Performed by: STUDENT IN AN ORGANIZED HEALTH CARE EDUCATION/TRAINING PROGRAM

## 2023-06-28 PROCEDURE — 93010 ELECTROCARDIOGRAM REPORT: CPT | Performed by: STUDENT IN AN ORGANIZED HEALTH CARE EDUCATION/TRAINING PROGRAM

## 2023-06-28 PROCEDURE — 93005 ELECTROCARDIOGRAM TRACING: CPT | Performed by: STUDENT IN AN ORGANIZED HEALTH CARE EDUCATION/TRAINING PROGRAM

## 2023-06-28 PROCEDURE — 93880 EXTRACRANIAL BILAT STUDY: CPT | Mod: 26 | Performed by: INTERNAL MEDICINE

## 2023-06-28 RX ORDER — METOPROLOL SUCCINATE 50 MG/1
50 TABLET, EXTENDED RELEASE ORAL DAILY
COMMUNITY
Start: 2023-05-26 | End: 2023-07-20 | Stop reason: DRUGHIGH

## 2023-06-28 ASSESSMENT — PAIN SCALES - GENERAL: PAINLEVEL: 0-NO PAIN

## 2023-06-28 ASSESSMENT — ENCOUNTER SYMPTOMS
SENSORY CHANGE: 1
CLAUDICATION: 1

## 2023-06-28 NOTE — PATIENT INSTRUCTIONS
Ms. Burris is a very pleasant 82-year-old female we have the pleasure seeing in cardiology clinic.  In terms of her instructions we will order 1 new test for her this is an ultrasound of her heart.  Once I have the results of her ultrasound of the heart as well as the ultrasound of her lower extremity results I will touch base with her and we will decide on whether there is any further testing or follow-up with vascular cardiology that is needed.  Depending on how her her exercise tolerance changes over the next 4 to 6 weeks will dictate whether we will need to pursue a stress test to ensure there is no underlying CAD.  My primary suspicion is that her dyspnea on exertion is more secondary to deconditioning as opposed to a primary cardiac issue.

## 2023-06-28 NOTE — PROGRESS NOTES
[facility]   Heart and Vascular [facility]     CARDIOLOGY OUTPATIENT CONSULT NOTE       No chief complaint on file.      HPI  Subjective    Patient ID: Belkis Burris is a 82 y.o. female.  82-year-old female with a history of permanent atrial fibrillation on DOAC, carotid artery stenosis with a left internal carotid artery stent right internal carotid artery occlusion, PAD with a left common iliac stent and a left distal SFA drug-coated balloon in 2019 who presents for establishment of general cardiac care.    Echo performed in she had a 24-hour Holter monitor performed in June 2022 that showed atrial fibrillation with an average heart rate of 86 bpm and several 3-second pauses.  May 2022 shows LVEF of 55 to 60%, normal RV function    She is doing well in general.  She presents for her yearly eval.  She denies any ongoing shortness of breath, chest pain, palpitations, lower extremity edema.    She does still have some dyspnea with significant exertion although she has relatively good exercise tolerance.  She has had a mild weight gain since 2017.  She is accompanied by her daughter.    Social history: Former smoker, quit in 2017, no alcohol.                CARDIAC PROBLEM LIST:  Primary Cardiologist: Dr. Gallardo       I. CARDIAC   1. Permanent atrial fibrillation   A. On anticoagulation     2. Statin intolerance       II. VASCULAR   1. Carotid artery stenosis   A. Left internal carotid artery stent   B. Right internal carotid artery occlusion     2. PAD   A. Left common iliac stent, left distal SFA DCB (July 2019)   B. Right mid and distal DCB (September 2019)       IV. OTHER   1. Dyslipidemia     Last updated: Addy Cox PA-C 8/24/21     Past Medical History:   Diagnosis Date    A-fib (CMS/[facility])     A-fib (CMS/[facility])     Cardiovascular disease     Complication of anesthesia     Delayed emergence from general anesthesia     DVT (deep venous thrombosis) (CMS/[facility])     Dysrhythmia     Afib    GERD  (gastroesophageal reflux disease) 5/2/2020    Hypercholesteremia     Scoliosis     TIA (transient ischemic attack)     Wears dentures     upper    Wears partial dentures     lower     Past Surgical History:   Procedure Laterality Date    AIFF ANGIO Bilateral 7/24/2019    Procedure: Aiff Angio;  Surgeon: Kailash Gallardo MD;  Location: [facility];  Service: Peripheral Vascular;  Laterality: Bilateral;  Late case #1 at 0830    AIFF ANGIO Right 9/24/2019    Procedure: Aiff Angio staged;  Surgeon: Kailash Gallardo MD;  Location: [facility];  Service: Peripheral Vascular;  Laterality: Right;    ATHERECTOMY - PERIPHERAL N/A 7/24/2019    Procedure: Atherectomy - peripheral;  Surgeon: Kailash Gallardo MD;  Location: [facility];  Service: Peripheral Vascular;  Laterality: N/A;    ATHERECTOMY - PERIPHERAL N/A 9/24/2019    Procedure: Atherectomy - peripheral;  Surgeon: Kailash Gallardo MD;  Location: [facility];  Service: Peripheral Vascular;  Laterality: N/A;    CAROTID STENT      PERIPHERAL ARTERIAL STENT GRAFT N/A 7/24/2019    Procedure: Peripheral artery stenting;  Surgeon: Kailash Gallardo MD;  Location: [facility];  Service: Peripheral Vascular;  Laterality: N/A;    TUBAL LIGATION         Allergies as of 06/28/2023 - Reviewed 05/26/2022   Allergen Reaction Noted    Amoxicillin Itching 05/26/2017    Metronidazole Hives 05/26/2017    Sulfa (sulfonamide antibiotics) Itching 05/26/2017     Current Outpatient Medications   Medication Sig Dispense Refill    aspirin 81 mg EC tablet Take 81 mg by mouth daily      vit C,E-Zn-copper-lutein-zeaxan (PreserVision AREDS-2) 250-90-40-1 mg capsule Take 1 capsule by mouth 2 (two) times a day      alirocumab (PRALUENT) 75 mg/mL pen injector Inject 75 mg under the skin every 14 (fourteen) days      dexlansoprazole (DEXILANT) 30 mg capsule Take 30 mg by mouth daily        Lactobacillus acidophilus 10 billion cell capsule Take 1 capsule by mouth daily         apixaban (ELIQUIS) 5 mg tablet Take 5 mg by mouth 2 (two) times a day         No current facility-administered medications for this visit.       Family History   Problem Relation Age of Onset    Other Mother         Complications of TB and emphysema, Cause of Death    Blood Clots Father         Cause of Death    Arrhythmia Sister     Other Brother         Carotid endarterectomy     Social History     Tobacco Use    Smoking status: Former     Packs/day: 0.50     Years: 10.00     Pack years: 5.00     Types: Cigarettes     Quit date:      Years since quittin.4    Smokeless tobacco: Never   Substance Use Topics    Alcohol use: No    Drug use: No       Review of Systems  Review of Systems   Cardiovascular:  Positive for claudication and dyspnea on exertion.   Neurological:  Positive for sensory change.   All other systems reviewed and are negative.    Objective   There were no vitals filed for this visit.   Weight:    Physical Exam  General: Comfortable not in any acute distress, alert awake and oriented x3  HEENT: Normocephalic atraumatic  Neck: No jugular venous distention or carotid bruit  Chest: Clear to auscultation, no adventitious breath sounds  CVS: S1-S2, irregularly irregular rhythm  Abdomen: Soft, nontender nondistended, normoactive bowel sounds present  Extremities: No edema cyanosis or clubbing  Vascular: 2+ radial pulsation bilaterally      Data Review:   Sodium   Date Value Ref Range Status   2022 136 mmol/L Final   2020 134 (L) 135 - 145 mmol/L Final     Potassium   Date Value Ref Range Status   2022 4.7 mmol/L Final   2020 3.9 3.6 - 5.0 mmol/L Final     Chloride   Date Value Ref Range Status   2022 101 mmol/L Final   2020 93 (L) 98 - 107 mmol/L Final     CO2   Date Value Ref Range Status   2022 23 mmol/L Final   2020 26 21 - 32 mmol/L Final     BUN   Date Value Ref Range Status   2022 18 mg/dL Final   2020 16 7 - 25 mg/dL Final      Creatinine   Date Value Ref Range Status   05/23/2022 0.77 mg/dL Final   09/25/2020 0.80 0.60 - 1.10 mg/dL Final     Glucose   Date Value Ref Range Status   05/23/2022 105 mg/dL Final   09/25/2020 118 (H) 70 - 105 mg/dL Final     Calcium   Date Value Ref Range Status   05/23/2022 10 mg/dL Final   09/25/2020 9.4 8.6 - 10.1 mg/dL Final     Total CK   Date Value Ref Range Status   05/27/2017 32 26 - 192 IU/L      Troponin I   Date Value Ref Range Status   03/11/2019 <0.030 <0.030 ng/mL Final     BNP   Date Value Ref Range Status   05/30/2017 543 (H) 0 - 99 pg/mL      Lipids:    Lab Results   Component Value Date    CHOL 171 05/23/2022    CHOL 147 06/07/2021    CHOL 108 05/22/2020     Lab Results   Component Value Date    HDL 48 05/23/2022    HDL 47 06/07/2021    HDL 39 05/22/2020     Lab Results   Component Value Date    LDLCALC 96 05/23/2022    LDLCALC 80 06/07/2021    LDLCALC 55 05/22/2020     Lab Results   Component Value Date    TRIG 154 05/23/2022    TRIG 109 06/07/2021    TRIG 72 05/22/2020        TSH:   Lab Results   Component Value Date    TSH 0.740 06/24/2016     Magnesium:   Lab Results   Component Value Date    MG 1.9 03/12/2019     Protime-INR:   Lab Results   Component Value Date    PT 16.1 (H) 07/07/2017    INR 1.3 (H) 07/07/2017     A1c: No results found for: HGBA1C    Electrocardiogram: 6/27/2023  Atrial fibrillation at 75 bpm    Assessment/Plan   There are no diagnoses linked to this encounter.    Detailed Assessment and Plan:  Permanent atrial fibrillation:  Continue on metoprolol succinate at 50 mg daily  [id number] score is elevated at 5, continue on apixaban, no worrisome bleeding issues    Carotid artery disease:  Cannot tolerate statin therapy  Continue on Praluent 75 mg q. 2 weeks  Repeat lipid profile for today    Peripheral vascular disease:  Continues to follow with vascular cardiology, she had ABIs and carotid duplex done today  Continue with risk factor modification with aspirin and  Praluent  We will arrange for her to follow-up with vascular cardiology depending the findings of her ultrasounds of the lower extremity    Patient Instructions:      Ms. Burris is a very pleasant 82-year-old female we have the pleasure seeing in cardiology clinic.  In terms of her instructions we will order 1 new test for her this is an ultrasound of her heart.  Once I have the results of her ultrasound of the heart as well as the ultrasound of her lower extremity results I will touch base with her and we will decide on whether there is any further testing or follow-up with vascular cardiology that is needed.  Depending on how her her exercise tolerance changes over the next 4 to 6 weeks will dictate whether we will need to pursue a stress test to ensure there is no underlying CAD.  My primary suspicion is that her dyspnea on exertion is more secondary to deconditioning as opposed to a primary cardiac issue.               Next follow up in 1 year with myself.    Sincerely,      Electronically signed by: MARANDA STEWART MD  6/27/2023  7:38 PM

## 2023-06-29 ENCOUNTER — TELEPHONE (OUTPATIENT)
Dept: CARDIOLOGY | Facility: CLINIC | Age: 83
End: 2023-06-29
Payer: MEDICARE

## 2023-06-29 NOTE — TELEPHONE ENCOUNTER
Patient calling in as she has office visit with her PCP Dr. Villarreal today and she would like her lab results that were drawn yesterday 6/28/2023 to be faxed over for his review as well.  Let patient know that once they are resulted I will fax these results over to their office.  Let her know that I will have to find out from Lab why this is not resulted as of yet first.  She voices understanding.     Called to laboratory and they show that lab labels were printed but nothing had been sent down.  They will have to find out status of lab draw and call me back.     Harriett in lab called me back to let me know that patient's lab specimen was lost in the tube system and so they never received them and so patient would have to be notified that she would need to be redrawn.    Called and let patient know of the above and she would like to have her labs drawn at the [facility] since this has happened here.  Let her know that I would fax the orders over.

## 2023-06-30 ENCOUNTER — TELEPHONE (OUTPATIENT)
Dept: CARDIOLOGY | Facility: CLINIC | Age: 83
End: 2023-06-30
Payer: MEDICARE

## 2023-06-30 NOTE — TELEPHONE ENCOUNTER
----- Message from WILDER Coello sent at 6/30/2023 11:42 AM MDT -----  Please let the patient know that I reviewed her carotid duplex.  Shows left ICA stent is patent.  Right ICA chronically occluded.  She is overdue for follow-up from a vascular perspective.  Please schedule with Charlette Farris PA-C or Janette Alvarez CNP As they will have more availability than myself        --------------------------------------------  6/30/23  I called to relay patient's results. She said she has been dealing with her chronic right ICA for years. I told her we would like to see her clinically because it looked like we needed to see her a little while ago. She is fine with this. She is waiting for her lower extremity results. I explained that the Physician Assistant will review them and send us a message to contact her with results. She verbalized understanding, no questions.  I also put in recall for clinic visit.

## 2023-06-30 NOTE — RESULT ENCOUNTER NOTE
Please let the patient know that I reviewed her carotid duplex.  Shows left ICA stent is patent.  Right ICA chronically occluded.  She is overdue for follow-up from a vascular perspective.  Please schedule with Charlette Farris PA-C or Janette Alvarez CNP As they will have more availability than myself

## 2023-07-14 ENCOUNTER — OFFICE VISIT (OUTPATIENT)
Dept: CARDIOLOGY | Facility: CLINIC | Age: 83
End: 2023-07-14
Payer: MEDICARE

## 2023-07-14 VITALS
DIASTOLIC BLOOD PRESSURE: 85 MMHG | BODY MASS INDEX: 34.63 KG/M2 | HEIGHT: 62 IN | WEIGHT: 188.2 LBS | SYSTOLIC BLOOD PRESSURE: 143 MMHG | OXYGEN SATURATION: 95 % | HEART RATE: 82 BPM | RESPIRATION RATE: 22 BRPM

## 2023-07-14 DIAGNOSIS — I65.21 OCCLUSION OF RIGHT CAROTID ARTERY: Primary | ICD-10-CM

## 2023-07-14 DIAGNOSIS — I73.9 PERIPHERAL ARTERIAL DISEASE (CMS/HCC): ICD-10-CM

## 2023-07-14 DIAGNOSIS — E78.2 MIXED HYPERLIPIDEMIA: ICD-10-CM

## 2023-07-14 DIAGNOSIS — I65.22 STENOSIS OF LEFT CAROTID ARTERY: ICD-10-CM

## 2023-07-14 PROCEDURE — G0463 HOSPITAL OUTPT CLINIC VISIT: HCPCS | Performed by: STUDENT IN AN ORGANIZED HEALTH CARE EDUCATION/TRAINING PROGRAM

## 2023-07-14 PROCEDURE — 99214 OFFICE O/P EST MOD 30 MIN: CPT | Performed by: STUDENT IN AN ORGANIZED HEALTH CARE EDUCATION/TRAINING PROGRAM

## 2023-07-14 ASSESSMENT — PAIN SCALES - GENERAL: PAINLEVEL: 0-NO PAIN

## 2023-07-14 NOTE — PROGRESS NOTES
[facility]   [address] [place] [id number]                                           CardioVascular Outpatient Follow-up Note    Subjective    Patient ID: Belkis Burris is a 82 y.o. female.    Chief Complaint:   Chief Complaint   Patient presents with    1YR F/U CAD PADTESTING DONE       HPI    Ms. Burris is an 82-year-old female with history of dyslipidemia, cardiomyopathy, atrial fibrillation, carotid artery stenosis, stroke, and peripheral arterial disease presents for 1 year follow-up.  Last saw Lee Mendoza, CNP 5/26/22, after moving back from [place]. Was doing well but noticing increased fatigue with activity. Denied chest pain or dyspnea.     Since follow-up, walking daily approximately a mile without difficulty.  Fatigue and dyspnea improving as becoming acclimated to the altitude.  States main limitation is her breathing.  We will get lower extremity edema when sitting during the summer.  Resolves overnight.  Not wearing compression.  Taking Eliquis and aspirin daily without hematuria, epistaxis, or melena.  Denies recent illness, injury, or hospitalization.  Denies chest pain, dyspnea, palpitations, claudication, nonhealing wounds, amaurosis, weakness, dysphagia, aphasia, fever, chills, nausea, and diarrhea.      Allergies as of 07/14/2023 - Reviewed 07/14/2023   Allergen Reaction Noted    Amoxicillin Itching 05/26/2017    Metronidazole Hives 05/26/2017    Sulfa (sulfonamide antibiotics) Itching 05/26/2017       Current Outpatient Medications   Medication Sig Dispense Refill    metoprolol succinate XL (TOPROL-XL) 50 mg 24 hr tablet Take 1 tablet (50 mg total) by mouth daily      aspirin 81 mg EC tablet Take 1 tablet (81 mg total) by mouth daily      alirocumab (PRALUENT) 75 mg/mL pen injector Inject 75 mg under the skin every 14 (fourteen) days      dexlansoprazole (DEXILANT) 30 mg capsule Take 1 capsule (30 mg total) by mouth daily      Lactobacillus acidophilus 10 billion  "cell capsule Take 1 capsule by mouth daily        apixaban (ELIQUIS) 5 mg tablet Take 1 tablet (5 mg total) by mouth 2 (two) times a day       No current facility-administered medications for this visit.         Review of Systems  Constitution: Negative for chills, fever, weight gain and weight loss.   HENT: Negative for congestion, headaches and hearing loss.    Eyes: Negative.    Cardiovascular: Negative for chest pain, claudication, + improving dyspnea on exertion, irregular heartbeat, leg swelling, near-syncope, orthopnea, palpitations, paroxysmal nocturnal dyspnea and syncope.   Respiratory: Negative for cough, hemoptysis, shortness of breath, snoring and wheezing.    Endocrine: Negative.    Hematologic/Lymphatic: Negative.    Skin: Negative.    Musculoskeletal: + Back/joint pain, + edema  Gastrointestinal: Negative for bloating, abdominal pain, change in bowel habit, bowel incontinence, constipation and diarrhea.   Neurological:   Negative for dizziness, focal weakness, light-headedness and numbness.   Psychiatric/Behavioral: Negative.      Objective     Vitals:    07/14/23 1211   BP: 143/85   BP Location: Right arm   Patient Position: Sitting   Cuff Size: Regular Adult   Pulse: 82   Resp: 22   SpO2: 95%   Weight: 85.4 kg (188 lb 3.2 oz)   Height: 1.575 m (5' 2\")     Weight: 85.4 kg (188 lb 3.2 oz)      Physical exam:  General:   Awake, alert, and oriented x3. NAD  Head:  Normocephalic, atraumatic.  Neck:  Supple.  No elevated JVD, negative Lt bruit, absent right carotid  Eyes:  Extraocular muscles intact.  Cardiovascular: Regular rate rhythm, no murmur.   Respiratory: Unlabored, on room air, no stridor, diminished at bases without wheeze or rhonchi  Abdominal:  Nondistended.  Extremities:  RLE>LLE soft edema feet.  No clubbing or cyanosis.    Pulses: 2+ palpable BL radial, 1+ palpable BL DP  Neurological:  No gross sensory or motor deficits noted.  Musculoskeletal:  Able to move all 4 extremities " spontaneously.  Skin: No ecchymosis, rashes, or wounds.    Data Review:     CBC:  Lab Results   Component Value Date    WBC 8.9 2020    HGB 15.0 2020    HCT 43.0 2020    MCV 86.3 2020    MCH 30.1 2020    MCHC 34.9 2020    RDW 14.2 (H) 2020     2020    MPV 8.7 2020        CMP:  Lab Results   Component Value Date     2022    K 4.7 2022     2022    CO2 23 2022    ANIONGAP 15 (H) 2020    BUN 18 2022    CREATININE 0.77 2022    GLUCOSE 105 2022    AST 39 (H) 2020    ALT 31 2020    ALKPHOS 74 2020    PROT 7.7 2020    ALBUMIN 3.6 2020    BILITOT 1.00 2020    EGFR 77 2022        LIPID PANEL:  Lab Results   Component Value Date    CHOL 171 2022     Lab Results   Component Value Date    HDL 48 2022     Lab Results   Component Value Date    LDLCALC 96 2022     Lab Results   Component Value Date    TRIG 154 2022         Imagin/28/23 US Carotid duplex bilateral    Narrative: The right common carotid artery and right internal carotid artery is chronically occluded Widely patent stent of the left internal carotid artery with 0 to 19% stenosis Antegrade vertebral artery flow bilaterally Greater than 50% stenosis suggested of the left external carotid artery Probably no significant stenosis of the right external carotid artery Compared to the study of May 2022 no significant change Consider repeat study in 1 year    23 aorto-iliac and BLE arterial duplex     Rt EL 0.93, L EL 0.98, biphasic throughout BL        Assessment    Assessment/plan:    Peripheral arterial disease  2019 L MADELYN stent, L dstl SFA DCB   2019 R mid/dstl SFA DCB   23 Duplex - biphasic throughout with normal EL BL   No claudication or nonhealing wounds  GDMT: cont daily walking program with goal 30-45 minute daily. Cont Praluent and aspirin 81mg  daily  Follow-up 1 year with aortic iliac and bilateral lower extremity arterial duplex 7/2024     Carotid Artery Stenosis   L ICA stent placed 2017 6/28/23 Duplex - patent L ICA stent, BL vert antegrade, known chronic occlusion R ICA. No change from prior   Continue aspirin and Praluent.  repeat duplex in 1 year 7/2024      Dyslipidemia   Last lipid panel 5/23/22 - LDL 96, HDL 48, TG's 154   Goal LDL <70     Statin intolerant from myalgias.   Cont praluent 75mg Q 2 weeks   Managed by PCP      Permanent Atrial Fibrillation   CHADSVASc 5.    Cont metoprolol succinate 50mg daily for rate control   Cont eliquis 5mg BID   Follow-up with general cardiology after echocardiogram 8/2023      Cardiomyopathy   NYHA class II/c   5/26/22 Echo: EF 55-60%    Euvolemic  Echo scheduled for 8/10/23   Continue to limit sodium intake 2 g less per day.  Encourage daily weights and to contact our clinic with 3 pound weight gain overnight or 5 pounds in a week.   Cont metoprolol succinate, no diuretic currently indicated   Follow up with general cardiology after echo 8/2023    On this date of service 35 minutes of total time was spent on this encounter.     Return in about 1 year (around 7/14/2024).    Electronically signed by: WILDER Lowe  7/14/2023  1:00 PM

## 2023-07-14 NOTE — PATIENT INSTRUCTIONS
Patient doing well today.  Discussed results of test duplexes: lower extremities with normal flow. Carotid stent patent, no change from last year.  Continue medications Eliquis, aspirin, and praulent  Contact clinic if symptoms progress or change  Echocardiogram scheduled for August, follow up with general cardiology for results.  Follow-up 1 year with carotid and lower extremity duplexes

## 2023-07-17 LAB
ABDOMINAL LT EXT ILIAC VEL: 91
ABDOMINAL LT INT ILIAC VEL: 122
ABDOMINAL RT EXT ILIAC VEL: 87
ANTDD1607: NORMAL
ANTDD1844: NORMAL
ANTPD1607: NORMAL
ANTPD1844: NORMAL
CFD1844: NORMAL
COMFEMD1607: NORMAL
LEFT ABI: 0.98
LEFT ANT TIBIAL SYS PSV: 30 CM/S
LEFT ANTERIOR TIBIAL: 194
LEFT ARM BP: 191
LEFT PERONEAL SYS PSV: 292 CM/S
LEFT PERONEAL SYS RATIO: 3.9
LEFT POST TIBIAL SYS PSV: 65 CM/S
LEFT POSTERIOR TIBIAL: 188
LEFT PROFUNDA SYS PSV: 71 CM/S
LEFT SUPER FEMORAL PROX SYS PSV: 151 CM/S
LEFT TIB/PER TRUNK SYS PSV: 75 CM/S
LEFTATADPA: NORMAL
LL ARTERIAL DUPLEX COMMON FEMORAL PEAK SYS VEL: 101 CM/S
LL ARTERIAL DUPLEX POPLITEAL PEAK SYS VEL: 64 CM/S
LTDCFA1803: NORMAL
LTDCIA1803: NORMAL
LTDEIA1803: NORMAL
LTDIIA1803: NORMAL
LTDPFAO1803: NORMAL
LTDSFAO1803: NORMAL
PERDD1607: NORMAL
PERDD1844: NORMAL
PERPD1607: NORMAL
PERPD1844: NORMAL
PERPS1607: NORMAL
POPDD1844: NORMAL
POPDISTD1607: NORMAL
POPPD1844: NORMAL
POPPROXD1607: NORMAL
POSTTIBDD1607: NORMAL
POSTTIBPD1607: NORMAL
PROD1844: NORMAL
PROFD1607: NORMAL
PTDD1844: NORMAL
PTPD1844: NORMAL
RH AO ENDOGRAFT LT CIA: 95
RH LT ILIAC DUP CIA DIST: 68
RH LT ILIAC DUP CIA MID: 95
RH LT ILIAC DUP DIST AO: 53
RH LT ILIAC DUP EIA DIST: 108
RH LT ILIAC DUP EIA MID: 106
RH LT LOWER ART ANT TIB DIST: 63.4 CM/S
RH LT LOWER ART PER DIST: 36 CM/S
RH LT LOWER ART POP DIST: 62 CM/S
RH LT LOWER ART POST TIB DIST: 73 CM/S
RH LT LOWER ART SFA DIST: 98 CM/S
RH LT LOWER ART SFA MID: 89 CM/S
RH RT ILIAC DUP CIA DIST: 174
RH RT ILIAC DUP CIA MID: 145
RH RT ILIAC DUP CIA PROX: 184
RH RT ILIAC DUP DIST AO: 53
RH RT ILIAC DUP EIA DIST: 156
RH RT ILIAC DUP EIA MID: 90
RH RT LOWER ART ANT TIBIAL SYS PSV: 61 CM/S
RH RT LOWER ART PER DIST: 42 CM/S
RH RT LOWER ART POP DIST: 67 CM/S
RH RT LOWER ART POST TIB DIST: 60 CM/S
RH RT LOWER ART SFA DIST: 81 CM/S
RH RT LOWER ART SFA MID RATIO: 3.8
RH RT LOWER ART SFA MID: 345
RIGHT ABI: 0.93
RIGHT ANTERIOR TIBIAL: 168
RIGHT ARM BP: 198
RIGHT PERONEAL SYS PSV: 47 CM/S
RIGHT POST TIBIAL SYS PSV: 73 CM/S
RIGHT POSTERIOR TIBIAL: 185
RIGHT PROFUNDA SYS PSV: 63 CM/S
RIGHT SUPER FEMORAL PROX SYS PSV: 90 CM/S
RIGHT TIB/PER TRUNK SYS PSV: 48 CM/S
RIGHTATADPA: NORMAL
RL ARTERIAL DUPLEX COMMON FEMORAL PEAK SYS VEL: 104 CM/S
RL ARTERIAL DUPLEX POPLITEAL PEAK SYS VEL: 52 CM/S
RTDCFA1801: NORMAL
RTDCIA1801: NORMAL
RTDEIA1801: NORMAL
RTDPFAO1801: NORMAL
RTDSFAO1801: NORMAL
RTSCIA1801: NORMAL
SFADD1607: NORMAL
SFADD1844: NORMAL
SFAMD1607: NORMAL
SFAMD1844: NORMAL
SFAMS1844: NORMAL
SFAPD1607: NORMAL
SFAPD1844: NORMAL
TIBD1607: NORMAL
TIBD1844: NORMAL

## 2023-07-17 PROCEDURE — 93925 LOWER EXTREMITY STUDY: CPT | Mod: 26 | Performed by: INTERNAL MEDICINE

## 2023-07-17 PROCEDURE — 93922 UPR/L XTREMITY ART 2 LEVELS: CPT | Mod: 26 | Performed by: INTERNAL MEDICINE

## 2023-07-17 PROCEDURE — 93978 VASCULAR STUDY: CPT | Mod: 26 | Performed by: INTERNAL MEDICINE

## 2023-07-19 ENCOUNTER — TELEPHONE (OUTPATIENT)
Dept: CARDIOLOGY | Facility: CLINIC | Age: 83
End: 2023-07-19
Payer: MEDICARE

## 2023-07-19 DIAGNOSIS — I10 PRIMARY HYPERTENSION: Primary | ICD-10-CM

## 2023-07-19 NOTE — TELEPHONE ENCOUNTER
Retrieved message from C pod from from [facility] nurse stating that her PCP, Dr. Villarreal, would like to increase her Metoprolol to 100mg po daily. No message was left as to why, only a number to call back. Attempted to return call to Dr. Villarreal's nurse; no answer; v/m left to return call regarding patient.

## 2023-07-19 NOTE — TELEPHONE ENCOUNTER
Dr. Villarreal's nurse phone back with following blood pressure/pulse readings:    7/5    132/92-83  7/8    133/82-78  7/12  137/86  7/14  149/90-70

## 2023-07-20 RX ORDER — METOPROLOL SUCCINATE 50 MG/1
75 TABLET, EXTENDED RELEASE ORAL DAILY
Qty: 135 TABLET | Refills: 3 | Status: SHIPPED | OUTPATIENT
Start: 2023-07-20 | End: 2024-07-30 | Stop reason: DRUGHIGH

## 2023-07-20 NOTE — TELEPHONE ENCOUNTER
Per Dr. Mckeon's note below:    It would be reasonable to increase first to 75 mg twice daily to see how she tolerates that dose.  As long as she does not have significant fatigue and decreased exercise tolerance on that dose we can consider going up to 100 mg twice daily.   Thanks     Clarified dose and it is to be 75mg po daily. Called Marry CEJA at [facility] and advised of above.

## 2023-08-10 ENCOUNTER — ANCILLARY PROCEDURE (OUTPATIENT)
Dept: CARDIOLOGY | Facility: CLINIC | Age: 83
End: 2023-08-10
Payer: MEDICARE

## 2023-08-10 VITALS
HEIGHT: 62 IN | SYSTOLIC BLOOD PRESSURE: 156 MMHG | WEIGHT: 188 LBS | BODY MASS INDEX: 34.6 KG/M2 | DIASTOLIC BLOOD PRESSURE: 82 MMHG

## 2023-08-10 DIAGNOSIS — I48.21 PERMANENT ATRIAL FIBRILLATION (CMS/HCC): ICD-10-CM

## 2023-08-10 DIAGNOSIS — R06.09 DOE (DYSPNEA ON EXERTION): ICD-10-CM

## 2023-08-10 LAB
ASCENDING AORTA: 3.03 CM
AV LVOT PEAK GRADIENT: 2.9 MMHG
AV MEAN GRADIENT: 7.77 MMHG
AV PEAK GRADIENT: 13.25 MMHG
BSA FOR ECHO PROCEDURE: 1.93 M2
DOP CALC AO PEAK VEL: 1.82 M/S
DOP CALC AO VTI: 37.4 CM
DOP CALC LVOT DIAMETER: 1.95 CM
DOP CALC LVOT STROKE VOLUME: 51 CM3
DOP CALC MV VTI: 24.07 CM
DOP CALC RVOT PEAK VEL: 0.8 M/S
DOP CALCLVOT PEAK VEL VTI: 17.1 CM
E/E' RATIO (AVERAGE): 16.2
E/E' RATIO: 15.6
ECHO EF ESTIMATED: 60 %
EJECTION FRACTION: 59 %
ERAP: 5 MMHG
INTERVENTRICULAR SEPTUM: 1 CM (ref 0.6–1.1)
IVC PROX: 2.05 CM
LA AREA A4C SYSTOLE: 66.7 CM3
LEFT ATRIUM SIZE: 4.32 CM
LEFT ATRIUM VOLUME INDEX: 28 ML/M2
LEFT ATRIUM VOLUME: 52.8 CM3
LEFT INTERNAL DIMENSION IN SYSTOLE: 2.7 CM (ref 2.6–3.93)
LEFT VENTRICLE DIASTOLIC VOLUME: 38 CM3
LEFT VENTRICLE SYSTOLIC VOLUME: 15 CM3
LEFT VENTRICULAR INTERNAL DIMENSION IN DIASTOLE: 4 CM (ref 4.39–6.1)
LVAD-AP2: 17.7 CM2
MR PISA EROA: 0.07 CM2
MR VTI: 191 CM
MV DEC SLOPE: 9210 MM/S2
MV DT: 134 MS
MV MEAN GRADIENT: 2.8 MMHG
MV PEAK E VEL: 138 CM/S
MV PEAK GRADIENT: 8 MMHG
MV STENOSIS PRESSURE HALF TIME: 45 MS
MV VALVE AREA P 1/2 METHOD: 4.89 CM2
MV VMAX: 137 CM/S
MVA (VTI): 2.12 CM2
PISA MRMAX VEL: 555 CM/S
POSTERIOR WALL: 1 CM (ref 0.6–1.1)
PULM VEIN S/D RATIO: 0.7
PV PEAK D VEL: 69 CM/S
PV PEAK GRADIENT: 6.35 MMHG
PV PEAK S VEL: 50 CM/S
PV VMAX: 1.26 M/S
RA AREA: 19.3 CM2
RH CV ECHO AV VALVE AREA VEL: 1.4 CM2
RH CV ECHO AV VALVE AREA VTI: 1.4 CM2
RH LVOT PEAK VELOCITY REST: 0.9 M/S
RIGHT VENTRICULAR INTERNAL DIMENSION IN DIASTOLE: 2.9 CM
RV AP4 BASE: 3.2 CM
S': 10.8 CM/S
TDI: 8.9 CM/S
TDILATERAL: 8.2 CM/S
TR MAX PG: 38.19 MMHG
TRICUSPID ANNULAR PLANE SYSTOLIC EXCURSION: 1.6 CM
TRICUSPID VALVE PEAK REGURGITATION VELOCITY: 3.09 M/S
TV REST PULMONARY ARTERY PRESSURE: 43 MMHG
Z-SCORE OF LEFT VENTRICULAR DIMENSION IN END DIASTOLE: -2.58
Z-SCORE OF LEFT VENTRICULAR DIMENSION IN END SYSTOLE: -1.33

## 2023-08-10 PROCEDURE — 93306 TTE W/DOPPLER COMPLETE: CPT | Mod: 26 | Performed by: INTERNAL MEDICINE

## 2023-08-10 PROCEDURE — 93306 TTE W/DOPPLER COMPLETE: CPT

## 2024-03-04 ENCOUNTER — OFFICE VISIT (OUTPATIENT)
Dept: URBAN - METROPOLITAN AREA CLINIC 7 | Facility: CLINIC | Age: 84
End: 2024-03-04
Payer: MEDICARE

## 2024-03-04 DIAGNOSIS — H35.3221 EXUDATIVE AGE-RELATED MACULAR DEGENERATION, LEFT EYE, WITH ACTIVE CHOROIDAL NEOVASCULARIZATION: Primary | ICD-10-CM

## 2024-03-04 DIAGNOSIS — H35.3114 NONEXUDATIVE AGE-RELATED MACULAR DEGENERATION, RIGHT EYE, ADVANCED ATROPHIC WITH SUBFOVEAL INVOLVEMENT: ICD-10-CM

## 2024-03-04 DIAGNOSIS — H43.813 VITREOUS DEGENERATION, BILATERAL: ICD-10-CM

## 2024-03-04 DIAGNOSIS — H25.13 AGE-RELATED NUCLEAR CATARACT, BILATERAL: ICD-10-CM

## 2024-03-04 PROCEDURE — 67028 INJECTION EYE DRUG: CPT | Performed by: OPHTHALMOLOGY

## 2024-03-04 PROCEDURE — 92134 CPTRZ OPH DX IMG PST SGM RTA: CPT | Performed by: OPHTHALMOLOGY

## 2024-03-04 PROCEDURE — 99214 OFFICE O/P EST MOD 30 MIN: CPT | Performed by: OPHTHALMOLOGY

## 2024-03-04 ASSESSMENT — INTRAOCULAR PRESSURE
OS: 16
OD: 17

## 2024-04-01 DIAGNOSIS — I25.10 CORONARY ARTERY DISEASE, UNSPECIFIED VESSEL OR LESION TYPE, UNSPECIFIED WHETHER ANGINA PRESENT, UNSPECIFIED WHETHER NATIVE OR TRANSPLANTED HEART: Primary | ICD-10-CM

## 2024-04-01 DIAGNOSIS — I73.9 PAD (PERIPHERAL ARTERY DISEASE) (CMS/HCC): ICD-10-CM

## 2024-04-09 ENCOUNTER — OFFICE VISIT (OUTPATIENT)
Dept: URBAN - METROPOLITAN AREA CLINIC 49 | Facility: LOCATION | Age: 84
End: 2024-04-09
Payer: MEDICARE

## 2024-04-09 DIAGNOSIS — H35.3221 EXUDATIVE AGE-RELATED MACULAR DEGENERATION, LEFT EYE, WITH ACTIVE CHOROIDAL NEOVASCULARIZATION: Primary | ICD-10-CM

## 2024-04-09 PROCEDURE — 92134 CPTRZ OPH DX IMG PST SGM RTA: CPT | Performed by: OPHTHALMOLOGY

## 2024-04-09 PROCEDURE — 67028 INJECTION EYE DRUG: CPT | Performed by: OPHTHALMOLOGY

## 2024-04-09 ASSESSMENT — INTRAOCULAR PRESSURE
OS: 19
OD: 20

## 2024-06-11 ENCOUNTER — OFFICE VISIT (OUTPATIENT)
Dept: URBAN - METROPOLITAN AREA CLINIC 49 | Facility: LOCATION | Age: 84
End: 2024-06-11
Payer: MEDICARE

## 2024-06-11 DIAGNOSIS — H35.3221 EXUDATIVE AGE-RELATED MACULAR DEGENERATION, LEFT EYE, WITH ACTIVE CHOROIDAL NEOVASCULARIZATION: Primary | ICD-10-CM

## 2024-06-11 PROCEDURE — 92134 CPTRZ OPH DX IMG PST SGM RTA: CPT | Performed by: OPHTHALMOLOGY

## 2024-06-11 PROCEDURE — 67028 INJECTION EYE DRUG: CPT | Performed by: OPHTHALMOLOGY

## 2024-06-11 ASSESSMENT — INTRAOCULAR PRESSURE
OS: 16
OD: 18

## 2024-07-25 ENCOUNTER — ANCILLARY PROCEDURE (OUTPATIENT)
Dept: CARDIOLOGY | Facility: CLINIC | Age: 84
End: 2024-07-25
Payer: MEDICARE

## 2024-07-25 ENCOUNTER — OFFICE VISIT (OUTPATIENT)
Dept: CARDIOLOGY | Facility: CLINIC | Age: 84
End: 2024-07-25
Payer: MEDICARE

## 2024-07-25 ENCOUNTER — LAB (OUTPATIENT)
Dept: LAB | Facility: CLINIC | Age: 84
End: 2024-07-25
Payer: MEDICARE

## 2024-07-25 VITALS
HEART RATE: 75 BPM | SYSTOLIC BLOOD PRESSURE: 112 MMHG | RESPIRATION RATE: 16 BRPM | WEIGHT: 175 LBS | DIASTOLIC BLOOD PRESSURE: 80 MMHG | HEIGHT: 62 IN | OXYGEN SATURATION: 92 % | BODY MASS INDEX: 32.2 KG/M2

## 2024-07-25 DIAGNOSIS — I65.23 BILATERAL CAROTID ARTERY STENOSIS: ICD-10-CM

## 2024-07-25 DIAGNOSIS — E78.2 MIXED HYPERLIPIDEMIA: ICD-10-CM

## 2024-07-25 DIAGNOSIS — I73.9 PAD (PERIPHERAL ARTERY DISEASE) (CMS/HCC): ICD-10-CM

## 2024-07-25 DIAGNOSIS — I48.21 PERMANENT ATRIAL FIBRILLATION (CMS/HCC): Primary | ICD-10-CM

## 2024-07-25 LAB — LDLC SERPL DIRECT ASSAY-MCNC: 70 MG/DL

## 2024-07-25 PROCEDURE — 83721 ASSAY OF BLOOD LIPOPROTEIN: CPT

## 2024-07-25 PROCEDURE — 36415 COLL VENOUS BLD VENIPUNCTURE: CPT

## 2024-07-25 PROCEDURE — 93978 VASCULAR STUDY: CPT

## 2024-07-25 PROCEDURE — 93005 ELECTROCARDIOGRAM TRACING: CPT | Performed by: PHYSICIAN ASSISTANT

## 2024-07-25 PROCEDURE — 93880 EXTRACRANIAL BILAT STUDY: CPT | Mod: 26 | Performed by: INTERNAL MEDICINE

## 2024-07-25 PROCEDURE — 93010 ELECTROCARDIOGRAM REPORT: CPT | Performed by: INTERNAL MEDICINE

## 2024-07-25 PROCEDURE — 99214 OFFICE O/P EST MOD 30 MIN: CPT | Performed by: PHYSICIAN ASSISTANT

## 2024-07-25 PROCEDURE — 93880 EXTRACRANIAL BILAT STUDY: CPT

## 2024-07-25 PROCEDURE — 93925 LOWER EXTREMITY STUDY: CPT

## 2024-07-25 PROCEDURE — G0463 HOSPITAL OUTPT CLINIC VISIT: HCPCS | Performed by: PHYSICIAN ASSISTANT

## 2024-07-25 RX ORDER — EVOLOCUMAB 140 MG/ML
140 INJECTION, SOLUTION SUBCUTANEOUS
COMMUNITY
Start: 2024-05-29

## 2024-07-25 ASSESSMENT — PAIN SCALES - GENERAL: PAINLEVEL: 0-NO PAIN

## 2024-07-25 NOTE — LETTER
[facility]  [address] [facility] [place] [address]  [phone]  Dept: [phone]  July 31, 2024     Alek Nails MD  [address] [place] [address]    Patient: Belkis Burris   YOB: 1940   Date of Visit: 7/25/2024       To:  Alek Nails MD    Our mutual patient, Belkis Burris, was seen in my office on 7/25/2024. Below are my notes.     Addy Cox PA  7/25/2024  4:53 PM  Signed            [facility] [place]  [address] [place] [address]  [phone]      Cardiology Outpatient Progress Note      Subjective     Patient ID: Belkis Burris is a 83 y.o. female.    Chief Complaint:   Chief Complaint   Patient presents with   • Atherosclerosis of native artery of right lower extremity w   .      HPI  HPI:    Patient is a 83-year-old female who presents for standard follow-up.  She reports that she has been doing fair at home.  She is exercising about 1 mile 5 times a week.  She gained about 40 pounds and is noticed worsening dyspnea since that time but it has been stable.  Denies any chest pain.  Regarding her atrial fibrillation denies any TR strokelike symptoms.  No issues with bleeding.  Denies any new TIA or strokelike symptoms.  Denies claudication.      Primary Cardiologist: Dr. Gallardo       I. CARDIAC   1. Permanent atrial fibrillation   A. On anticoagulation     2. Statin intolerance       II. VASCULAR   1. Carotid artery stenosis   A. Left internal carotid artery stent   B. Right internal carotid artery occlusion     2. PAD   A. Left common iliac stent, left distal SFA DCB (July 2019)   B. Right mid and distal DCB (September 2019)       IV. OTHER   1. Dyslipidemia     Last updated: Addy Cox PA-C 8/24/21     Past Medical History:   Diagnosis Date   • A-fib (CMS/HCC)    • A-fib (CMS/HCC)    • Cardiovascular disease    • Complication of anesthesia    • Delayed emergence from general  anesthesia    • DVT (deep venous thrombosis) (CMS/[facility])    • Dysrhythmia     Afib   • GERD (gastroesophageal reflux disease) 5/2/2020   • Hypercholesteremia    • Scoliosis    • TIA (transient ischemic attack)    • Wears dentures     upper   • Wears partial dentures     lower       Past Surgical History:   Procedure Laterality Date   • AIFF ANGIO Bilateral 7/24/2019    Procedure: Aiff Angio;  Surgeon: Kailash Gallardo MD;  Location: [facility];  Service: Peripheral Vascular;  Laterality: Bilateral;  Late case #1 at 0830   • AIFF ANGIO Right 9/24/2019    Procedure: Aiff Angio staged;  Surgeon: Kailash Gallardo MD;  Location: [facility];  Service: Peripheral Vascular;  Laterality: Right;   • ATHERECTOMY - PERIPHERAL N/A 7/24/2019    Procedure: Atherectomy - peripheral;  Surgeon: Kailash Gallardo MD;  Location: [facility];  Service: Peripheral Vascular;  Laterality: N/A;   • ATHERECTOMY - PERIPHERAL N/A 9/24/2019    Procedure: Atherectomy - peripheral;  Surgeon: Kailash Gallardo MD;  Location: [facility];  Service: Peripheral Vascular;  Laterality: N/A;   • CAROTID STENT     • PERIPHERAL ARTERIAL STENT GRAFT N/A 7/24/2019    Procedure: Peripheral artery stenting;  Surgeon: Kailash Gallardo MD;  Location: [facility] Lab;  Service: Peripheral Vascular;  Laterality: N/A;   • TUBAL LIGATION         Allergies as of 07/25/2024 - Reviewed 07/25/2024   Allergen Reaction Noted   • Amoxicillin Itching 05/26/2017   • Metronidazole Hives 05/26/2017   • Sulfa (sulfonamide antibiotics) Itching 05/26/2017       Current Outpatient Medications   Medication Sig Dispense Refill   • Repatha SureClick 140 mg/mL Inject 1 mL (140 mg total) under the skin every 14 (fourteen) days     • metoprolol succinate XL (TOPROL-XL) 50 mg 24 hr tablet Take 1.5 tablets (75 mg total) by mouth daily (Patient taking differently: Take 2 tablets (100 mg total) by mouth daily) 135 tablet 3   • dexlansoprazole (DEXILANT) 30 mg capsule  Take 1 capsule (30 mg total) by mouth daily     • Lactobacillus acidophilus 10 billion cell capsule Take 1 capsule by mouth daily       • apixaban (ELIQUIS) 5 mg tablet Take 1 tablet (5 mg total) by mouth 2 (two) times a day     • aspirin 81 mg EC tablet Take 1 tablet (81 mg total) by mouth daily       No current facility-administered medications for this visit.       Family History   Problem Relation Age of Onset   • Other Mother         Complications of TB and emphysema, Cause of Death   • Blood Clots Father         Cause of Death   • Arrhythmia Sister    • Other Brother         Carotid endarterectomy       Social History     Tobacco Use   • Smoking status: Former     Current packs/day: 0.00     Average packs/day: 0.5 packs/day for 10.0 years (5.0 ttl pk-yrs)     Types: Cigarettes     Start date:      Quit date:      Years since quittin.5   • Smokeless tobacco: Never   Vaping Use   • Vaping status: Never Used   Substance Use Topics   • Alcohol use: No   • Drug use: No         Review of Systems  ROS  10 point review of systems performed.  Pertinent positives in the HPI and all others are negative.   [x]                     Const                                   [x]                      Eyes   [x]                     ENT                                     [x]                      Resp                                       [x]                     CV                                       [x]                      GI   [x]                                                            [x]                      Neuro   [x]                     Musc                                    [x]                      Skin   [x]                     Psych                                  [x]                      Endo   [x]                     Allergy                                 [x]                      Heme/Lymph.    Objective    Vitals:    24 1106   BP: 112/80   Pulse: 75   Resp: 16   SpO2: 92%   Height: 1.575 m  "(5' 2\")   Weight: 79.4 kg (175 lb)   PainSc: 0-No pain   Patient Position: Sitting     Weight: 79.4 kg (175 lb)    Physical Exam  Vitals reviewed.   Constitutional:       General: She is not in acute distress.  HENT:      Head: Normocephalic and atraumatic.   Neck:      Vascular: No carotid bruit, hepatojugular reflux or JVD.   Cardiovascular:      Rate and Rhythm: Normal rate and regular rhythm.      Pulses: Intact distal pulses.      Heart sounds: Normal heart sounds, S1 normal and S2 normal. No murmur heard.     No friction rub. No S3 or S4 sounds.   Pulmonary:      Effort: No respiratory distress.      Breath sounds: Normal breath sounds. No decreased breath sounds, wheezing, rhonchi or rales.   Abdominal:      Palpations: Abdomen is soft.      Tenderness: There is no abdominal tenderness.   Musculoskeletal:      Right lower leg: No edema.      Left lower leg: No edema.   Neurological:      Mental Status: She is oriented to person, place, and time.   Psychiatric:         Behavior: Behavior normal.         Thought Content: Thought content normal.         Data Review:     EKG:    Lipid:   Total cholesterol   Date Value Ref Range Status   11/07/2019 285 (A) 200 mg/dL Final     Cholesterol   Date Value Ref Range Status   05/23/2022 171 mg/dL Final     HDL   Date Value Ref Range Status   05/23/2022 48 mg/dL Final   11/07/2019 42 40 - 60 mg/dL Final     Triglycerides   Date Value Ref Range Status   05/23/2022 154 mg/dL Final   11/07/2019 131 150 mg/dL Final     LDL Calculated   Date Value Ref Range Status   05/23/2022 96 mg/dL Final   05/28/2017 96 <100 mg/dL          Assessment/Plan  Diagnoses and all orders for this visit:    Permanent atrial fibrillation (CMS/HCC)  -     ECG 12 lead -Normal, Today  [id number] Score greater than 2  Patient remains on Eliquis  She has permanent atrial fibrillation with rate controlled.  She is interested in heart research trial.  Will have them contact her  Continue with metoprolol " and Eliquis 5 mg twice daily for now    Patient is personally request recommendations with her anticoagulation in preparation for upcoming surgery.  She will be having upcoming cataract surgery with Dr. Alek Nails.  Would recommend holding Eliquis 48 hours prior to surgery and then resume when safe to do so following surgery.  As requested by the patient also my note to ophthalmology    Bilateral carotid artery stenosis  Carotid duplex performed today shows right ICA  with patent left carotid.  Repeat imaging 1 year      Mixed hyperlipidemia  -     LDL cholesterol, direct Blood, Venous; Future  Lipid panel today shows she is at target goal of LDL of 70  Continue with Repatha    PAD (peripheral artery disease) (CMS/[facility])  Duplex today still pending official over read.  Right distal SFA shows a moderate to severe stenosis.  Biphasic waveforms below this.  Send just not hemodynamically significant  Recommend ongoing conservative management as she is asymptomatic with regular exercise routine.  Proximal iliac stents appear patent.      Patient will be having    Total time spent in this encounter: 30 minutes    Return in about 1 year (around 7/25/2025) for Addy Cox PA-C (with testing if applicable).      WILDER WAYNE  7/25/2024      A voice recognition program was used to aid in documentation of this record. Sometimes words are not presented exactly as they were spoken.  While efforts were made to carefully edit and correct any inaccuracies, some errors may be present.  Please take this into context.  Please contact the provider if errors are identified.    Information provided throughout the HPI, objective data, assessment and plan were personally reviewed including/but not limited to: medical history, previous provider encounter(s), diagnostic imaging, lab testing, procedure(s) notes. The review of this data helped aide in the composition of the office visit documenation and face-to-face time associated  with this encounter.     If you have questions, please do not hesitate to call me. I look forward to following your patient along with you.         Sincerely,        WILDER WAYNE        CC: No Recipients

## 2024-07-25 NOTE — PROGRESS NOTES
[facility] [place]  [address] [place] [address]  [phone]      Cardiology Outpatient Progress Note      Subjective      Patient ID: Belkis Burris is a 83 y.o. female.    Chief Complaint:   Chief Complaint   Patient presents with    Atherosclerosis of native artery of right lower extremity w   .      HPI  HPI:    Patient is a 83-year-old female who presents for standard follow-up.  She reports that she has been doing fair at home.  She is exercising about 1 mile 5 times a week.  She gained about 40 pounds and is noticed worsening dyspnea since that time but it has been stable.  Denies any chest pain.  Regarding her atrial fibrillation denies any TR strokelike symptoms.  No issues with bleeding.  Denies any new TIA or strokelike symptoms.  Denies claudication.      Primary Cardiologist: Dr. Gallardo       I. CARDIAC   1. Permanent atrial fibrillation   A. On anticoagulation     2. Statin intolerance       II. VASCULAR   1. Carotid artery stenosis   A. Left internal carotid artery stent   B. Right internal carotid artery occlusion     2. PAD   A. Left common iliac stent, left distal SFA DCB (July 2019)   B. Right mid and distal DCB (September 2019)       IV. OTHER   1. Dyslipidemia     Last updated: Addy Cox PA-C 8/24/21     Past Medical History:   Diagnosis Date    A-fib (CMS/[facility])     A-fib (CMS/[facility])     Cardiovascular disease     Complication of anesthesia     Delayed emergence from general anesthesia     DVT (deep venous thrombosis) (CMS/[facility])     Dysrhythmia     Afib    GERD (gastroesophageal reflux disease) 5/2/2020    Hypercholesteremia     Scoliosis     TIA (transient ischemic attack)     Wears dentures     upper    Wears partial dentures     lower       Past Surgical History:   Procedure Laterality Date    AIFF ANGIO Bilateral 7/24/2019    Procedure: Aiff Angio;  Surgeon: Kailash Gallardo MD;  Location: [facility];  Service: Peripheral Vascular;   Laterality: Bilateral;  Late case #1 at 0830    AIFF ANGIO Right 9/24/2019    Procedure: Aiff Angio staged;  Surgeon: Kailash Gallardo MD;  Location: [facility];  Service: Peripheral Vascular;  Laterality: Right;    ATHERECTOMY - PERIPHERAL N/A 7/24/2019    Procedure: Atherectomy - peripheral;  Surgeon: Kailash Gallardo MD;  Location: [facility] Lab;  Service: Peripheral Vascular;  Laterality: N/A;    ATHERECTOMY - PERIPHERAL N/A 9/24/2019    Procedure: Atherectomy - peripheral;  Surgeon: Kailash Gallardo MD;  Location: [facility];  Service: Peripheral Vascular;  Laterality: N/A;    CAROTID STENT      PERIPHERAL ARTERIAL STENT GRAFT N/A 7/24/2019    Procedure: Peripheral artery stenting;  Surgeon: Kailash Gallardo MD;  Location: [facility] Lab;  Service: Peripheral Vascular;  Laterality: N/A;    TUBAL LIGATION         Allergies as of 07/25/2024 - Reviewed 07/25/2024   Allergen Reaction Noted    Amoxicillin Itching 05/26/2017    Metronidazole Hives 05/26/2017    Sulfa (sulfonamide antibiotics) Itching 05/26/2017       Current Outpatient Medications   Medication Sig Dispense Refill    Repatha SureClick 140 mg/mL Inject 1 mL (140 mg total) under the skin every 14 (fourteen) days      metoprolol succinate XL (TOPROL-XL) 50 mg 24 hr tablet Take 1.5 tablets (75 mg total) by mouth daily (Patient taking differently: Take 2 tablets (100 mg total) by mouth daily) 135 tablet 3    dexlansoprazole (DEXILANT) 30 mg capsule Take 1 capsule (30 mg total) by mouth daily      Lactobacillus acidophilus 10 billion cell capsule Take 1 capsule by mouth daily        apixaban (ELIQUIS) 5 mg tablet Take 1 tablet (5 mg total) by mouth 2 (two) times a day      aspirin 81 mg EC tablet Take 1 tablet (81 mg total) by mouth daily       No current facility-administered medications for this visit.       Family History   Problem Relation Age of Onset    Other Mother         Complications of TB and emphysema, Cause of Death    Blood  "Clots Father         Cause of Death    Arrhythmia Sister     Other Brother         Carotid endarterectomy       Social History     Tobacco Use    Smoking status: Former     Current packs/day: 0.00     Average packs/day: 0.5 packs/day for 10.0 years (5.0 ttl pk-yrs)     Types: Cigarettes     Start date:      Quit date: 2017     Years since quittin.5    Smokeless tobacco: Never   Vaping Use    Vaping status: Never Used   Substance Use Topics    Alcohol use: No    Drug use: No         Review of Systems  ROS  10 point review of systems performed.  Pertinent positives in the HPI and all others are negative.   [x]                     Const                                   [x]                      Eyes   [x]                     ENT                                     [x]                      Resp                                       [x]                     CV                                       [x]                      GI   [x]                                                            [x]                      Neuro   [x]                     Musc                                    [x]                      Skin   [x]                     Psych                                  [x]                      Endo   [x]                     Allergy                                 [x]                      Heme/Lymph.    Objective     Vitals:    24 1106   BP: 112/80   Pulse: 75   Resp: 16   SpO2: 92%   Height: 1.575 m (5' 2\")   Weight: 79.4 kg (175 lb)   PainSc: 0-No pain   Patient Position: Sitting     Weight: 79.4 kg (175 lb)    Physical Exam  Vitals reviewed.   Constitutional:       General: She is not in acute distress.  HENT:      Head: Normocephalic and atraumatic.   Neck:      Vascular: No carotid bruit, hepatojugular reflux or JVD.   Cardiovascular:      Rate and Rhythm: Normal rate and regular rhythm.      Pulses: Intact distal pulses.      Heart sounds: Normal heart sounds, S1 normal and S2 normal. No " murmur heard.     No friction rub. No S3 or S4 sounds.   Pulmonary:      Effort: No respiratory distress.      Breath sounds: Normal breath sounds. No decreased breath sounds, wheezing, rhonchi or rales.   Abdominal:      Palpations: Abdomen is soft.      Tenderness: There is no abdominal tenderness.   Musculoskeletal:      Right lower leg: No edema.      Left lower leg: No edema.   Neurological:      Mental Status: She is oriented to person, place, and time.   Psychiatric:         Behavior: Behavior normal.         Thought Content: Thought content normal.         Data Review:     EKG:    Lipid:   Total cholesterol   Date Value Ref Range Status   11/07/2019 285 (A) 200 mg/dL Final     Cholesterol   Date Value Ref Range Status   05/23/2022 171 mg/dL Final     HDL   Date Value Ref Range Status   05/23/2022 48 mg/dL Final   11/07/2019 42 40 - 60 mg/dL Final     Triglycerides   Date Value Ref Range Status   05/23/2022 154 mg/dL Final   11/07/2019 131 150 mg/dL Final     LDL Calculated   Date Value Ref Range Status   05/23/2022 96 mg/dL Final   05/28/2017 96 <100 mg/dL          Assessment/Plan   Diagnoses and all orders for this visit:    Permanent atrial fibrillation (CMS/HCC)  -     ECG 12 lead -Normal, Today  [id number] Score greater than 2  Patient remains on Eliquis  She has permanent atrial fibrillation with rate controlled.  She is interested in heart research trial.  Will have them contact her  Continue with metoprolol and Eliquis 5 mg twice daily for now    Patient is personally request recommendations with her anticoagulation in preparation for upcoming surgery.  She will be having upcoming cataract surgery with Dr. Alek Nails.  Would recommend holding Eliquis 48 hours prior to surgery and then resume when safe to do so following surgery.  As requested by the patient also my note to ophthalmology    Bilateral carotid artery stenosis  Carotid duplex performed today shows right ICA  with patent left  carotid.  Repeat imaging 1 year      Mixed hyperlipidemia  -     LDL cholesterol, direct Blood, Venous; Future  Lipid panel today shows she is at target goal of LDL of 70  Continue with Repatha    PAD (peripheral artery disease) (CMS/[facility])  Duplex today still pending official over read.  Right distal SFA shows a moderate to severe stenosis.  Biphasic waveforms below this.  Send just not hemodynamically significant  Recommend ongoing conservative management as she is asymptomatic with regular exercise routine.  Proximal iliac stents appear patent.      Patient will be having    Total time spent in this encounter: 30 minutes    Return in about 1 year (around 7/25/2025) for Addy Cox PA-C (with testing if applicable).      WILDER WAYNE  7/25/2024      A voice recognition program was used to aid in documentation of this record. Sometimes words are not presented exactly as they were spoken.  While efforts were made to carefully edit and correct any inaccuracies, some errors may be present.  Please take this into context.  Please contact the provider if errors are identified.    Information provided throughout the HPI, objective data, assessment and plan were personally reviewed including/but not limited to: medical history, previous provider encounter(s), diagnostic imaging, lab testing, procedure(s) notes. The review of this data helped aide in the composition of the office visit documenation and face-to-face time associated with this encounter.

## 2024-07-25 NOTE — PATIENT INSTRUCTIONS
Your cholesterol historically still is not quite at goal.  Will try to improve his just a little bit more and get a repeat cholesterol test today.  If it is still elevated greater than 70 we will plan on starting Zetia 10 mg daily.    Your carotid stent looks good without any significant blockage.    The stents in your leg look good without any significant blockage.  There is a new blockage in the right mid thigh vessel.  It is not quite significant and given that you are not having any symptoms we do not need to pursue any sort of intervention at this time.  Continue to push through with your exercise regimen by either increasing the speed at which you are walking or the total time.    I will send a note to Dr. Alek Nails regarding your cataract surgery and holding Eliquis.  You can hold this 48 hours prior to your surgery and then resume when safe to following surgery which is typically the evening of.    The research team will contact you regarding if you are a candidate for the newer generation of blood thinners called factor XI inhibitors.

## 2024-07-26 NOTE — RESULT ENCOUNTER NOTE
Please let her know that her LDL is 70 which is essentially just at target goal.  Will continue with the current medical therapy

## 2024-07-29 ENCOUNTER — TELEPHONE (OUTPATIENT)
Dept: CARDIOLOGY | Facility: CLINIC | Age: 84
End: 2024-07-29
Payer: MEDICARE

## 2024-07-29 PROCEDURE — 93978 VASCULAR STUDY: CPT | Mod: 26 | Performed by: INTERNAL MEDICINE

## 2024-07-29 PROCEDURE — 93925 LOWER EXTREMITY STUDY: CPT | Mod: 26 | Performed by: INTERNAL MEDICINE

## 2024-07-29 NOTE — TELEPHONE ENCOUNTER
----- Message from NATE PATEL sent at 7/26/2024  9:32 AM MDT -----  Please let her know that her LDL is 70 which is essentially just at target goal.  Will continue with the current medical therapy

## 2024-07-30 ENCOUNTER — OFFICE VISIT (OUTPATIENT)
Dept: CARDIOLOGY | Facility: CLINIC | Age: 84
End: 2024-07-30
Payer: MEDICARE

## 2024-07-30 VITALS
RESPIRATION RATE: 16 BRPM | HEIGHT: 62 IN | WEIGHT: 177.4 LBS | OXYGEN SATURATION: 95 % | DIASTOLIC BLOOD PRESSURE: 86 MMHG | HEART RATE: 87 BPM | BODY MASS INDEX: 32.65 KG/M2 | SYSTOLIC BLOOD PRESSURE: 130 MMHG

## 2024-07-30 DIAGNOSIS — I48.92 ATRIAL FIBRILLATION AND FLUTTER (CMS/HCC): Primary | ICD-10-CM

## 2024-07-30 DIAGNOSIS — I65.23 BILATERAL CAROTID ARTERY STENOSIS: ICD-10-CM

## 2024-07-30 DIAGNOSIS — I73.9 PAD (PERIPHERAL ARTERY DISEASE) (CMS/HCC): ICD-10-CM

## 2024-07-30 DIAGNOSIS — I35.0 NONRHEUMATIC AORTIC (VALVE) STENOSIS: ICD-10-CM

## 2024-07-30 DIAGNOSIS — I10 PRIMARY HYPERTENSION: ICD-10-CM

## 2024-07-30 DIAGNOSIS — E78.2 MIXED HYPERLIPIDEMIA: ICD-10-CM

## 2024-07-30 DIAGNOSIS — I48.91 ATRIAL FIBRILLATION AND FLUTTER (CMS/HCC): Primary | ICD-10-CM

## 2024-07-30 PROCEDURE — G0463 HOSPITAL OUTPT CLINIC VISIT: HCPCS | Performed by: INTERNAL MEDICINE

## 2024-07-30 PROCEDURE — 99214 OFFICE O/P EST MOD 30 MIN: CPT | Performed by: INTERNAL MEDICINE

## 2024-07-30 RX ORDER — METOPROLOL SUCCINATE 50 MG/1
100 TABLET, EXTENDED RELEASE ORAL DAILY
Start: 2024-07-30 | End: 2025-07-30

## 2024-07-30 ASSESSMENT — ENCOUNTER SYMPTOMS
BRUISES/BLEEDS EASILY: 1
ORTHOPNEA: 0
DIZZINESS: 0
HEMATOCHEZIA: 0
BLACKOUTS: 0
SHORTNESS OF BREATH: 0
HEMATURIA: 0
LIGHT-HEADEDNESS: 0
FALLS: 0
PALPITATIONS: 0
PND: 0
CLAUDICATION: 0
NEAR-SYNCOPE: 0
IRREGULAR HEARTBEAT: 0
SYNCOPE: 0

## 2024-07-30 ASSESSMENT — PAIN SCALES - GENERAL: PAINLEVEL: 0-NO PAIN

## 2024-07-30 NOTE — PROGRESS NOTES
CARDIOLOGY OUTPATIENT FOLLOW-UP NOTE                                       Chief Complaint:   Chief Complaint   Patient presents with    Atrial Fibrillation    Carotid Artery Disease    peripheral vasculiar disease       Subjective      Belkis Burris is a 83 y.o. female    HPI: This is a pleasant 83-year-old woman with permanent atrial fibrillation, carotid artery stenosis with left ICA stent, and left common iliac stent with distal SFA drug-coated balloon angioplasty in 2019.  She also has dyslipidemia with statin intolerance.  She is Repatha and her cholesterol has been under good control.  Recent LDL was 70 at target.  Patient denies any cardiac symptoms, no chest pain, shortness of breath, or passing out spells.  She has not had any falls.  She does bleed a bit easier so she temporarily stopped taking her aspirin.  She continues to take Eliquis.  She denies having any pain in her legs with walking.  Her PCP recently increased her metoprolol to 100 mg daily because of higher blood pressures.    Echocardiogram performed 8/10/2023 showed normal LVEF of 59% with mild aortic stenosis and mild to moderate tricuspid regurgitation.    CARDIOLOGY PROBLEM LIST:  Primary Cardiologist: Dr. Gallardo       I. CARDIAC   1. Permanent atrial fibrillation   A. On anticoagulation     2. Statin intolerance       II. VASCULAR   1. Carotid artery stenosis   A. Left internal carotid artery stent   B. Right internal carotid artery occlusion     2. PAD   A. Left common iliac stent, left distal SFA DCB (July 2019)   B. Right mid and distal DCB (September 2019)       IV. OTHER   1. Dyslipidemia     Last updated: Addy Cox PA-C 8/24/21     Past Medical History:   Diagnosis Date    A-fib (CMS/[facility])     A-fib (CMS/[facility])     Cardiovascular disease     Complication of anesthesia     Delayed emergence from general anesthesia     DVT (deep venous thrombosis) (CMS/[facility])     Dysrhythmia     Afib    GERD (gastroesophageal reflux  disease) 2020    Hypercholesteremia     Scoliosis     TIA (transient ischemic attack)     Wears dentures     upper    Wears partial dentures     lower       Allergies as of 2024 - Reviewed 2024   Allergen Reaction Noted    Amoxicillin Itching 2017    Metronidazole Hives 2017    Sulfa (sulfonamide antibiotics) Itching 2017       Current Outpatient Medications:     Repatha SureClick 140 mg/mL, Inject 1 mL (140 mg total) under the skin every 14 (fourteen) days, Disp: , Rfl:     dexlansoprazole (DEXILANT) 30 mg capsule, Take 1 capsule (30 mg total) by mouth daily, Disp: , Rfl:     Lactobacillus acidophilus 10 billion cell capsule, Take 1 capsule by mouth daily  , Disp: , Rfl:     apixaban (ELIQUIS) 5 mg tablet, Take 1 tablet (5 mg total) by mouth 2 (two) times a day, Disp: , Rfl:     metoprolol succinate XL (TOPROL-XL) 50 mg 24 hr tablet, Take 2 tablets (100 mg total) by mouth daily, Disp: , Rfl:     aspirin 81 mg EC tablet, Take 1 tablet (81 mg total) by mouth daily, Disp: , Rfl:     Social History     Tobacco Use    Smoking status: Former     Current packs/day: 0.00     Average packs/day: 0.5 packs/day for 10.0 years (5.0 ttl pk-yrs)     Types: Cigarettes     Start date:      Quit date: 2017     Years since quittin.5    Smokeless tobacco: Never   Vaping Use    Vaping status: Never Used   Substance Use Topics    Alcohol use: No    Drug use: No       Review of Systems  Review of Systems   Constitutional: Negative for malaise/fatigue.   HENT:  Negative for nosebleeds.    Cardiovascular:  Negative for chest pain, claudication, cyanosis, dyspnea on exertion, irregular heartbeat, leg swelling/pain, near-syncope, orthopnea, palpitations, PND and syncope/fainting.   Respiratory:  Negative for shortness of breath.    Endocrine: Negative for diabetic.   Hematologic/Lymphatic: Negative for major bleeding. Bruises/bleeds easily.        On eliquis, asa    Musculoskeletal:  Negative for  "falls.   Gastrointestinal:  Negative for hematochezia and melena.   Genitourinary:  Negative for hematuria.   Neurological:  Negative for dizziness, light-headedness and blackouts.   All other systems reviewed and are negative.            Objective   Vitals:    07/30/24 1106   BP: 130/86   Pulse: 87   Resp: 16   SpO2: 95%   Height: 1.575 m (5' 2.01\")   Weight: 80.5 kg (177 lb 6.4 oz)   PainSc: 0-No pain   Patient Position: Sitting     Weight: 80.5 kg (177 lb 6.4 oz)    Physical Exam  Physical Exam  Vitals reviewed.   Constitutional:       General: She is not in acute distress.     Appearance: She is well-developed.   Neck:      Vascular: No JVD.   Cardiovascular:      Rate and Rhythm: Normal rate. Rhythm irregular.      Pulses: Intact distal pulses.      Heart sounds: Heart sounds are distant. No murmur heard.  Pulmonary:      Effort: Pulmonary effort is normal.      Breath sounds: Normal breath sounds. No wheezing or rales.   Abdominal:      General: There is no distension.      Palpations: Abdomen is soft.   Musculoskeletal:      Cervical back: Neck supple.      Right lower leg: No edema.      Left lower leg: No edema.           Data Review:   Sodium   Date Value Ref Range Status   05/23/2022 136 mmol/L Final   09/25/2020 134 (L) 135 - 145 mmol/L Final     Potassium   Date Value Ref Range Status   05/23/2022 4.7 mmol/L Final   09/25/2020 3.9 3.6 - 5.0 mmol/L Final     Chloride   Date Value Ref Range Status   05/23/2022 101 mmol/L Final   09/25/2020 93 (L) 98 - 107 mmol/L Final     CO2   Date Value Ref Range Status   05/23/2022 23 mmol/L Final   09/25/2020 26 21 - 32 mmol/L Final     BUN   Date Value Ref Range Status   05/23/2022 18 mg/dL Final   09/25/2020 16 7 - 25 mg/dL Final     Creatinine   Date Value Ref Range Status   05/23/2022 0.77 mg/dL Final   09/25/2020 0.80 0.60 - 1.10 mg/dL Final     Glucose   Date Value Ref Range Status   05/23/2022 105 mg/dL Final   09/25/2020 118 (H) 70 - 105 mg/dL Final " "    Calcium   Date Value Ref Range Status   05/23/2022 10 mg/dL Final   09/25/2020 9.4 8.6 - 10.1 mg/dL Final     Total CK   Date Value Ref Range Status   05/27/2017 32 26 - 192 IU/L      Troponin I   Date Value Ref Range Status   03/11/2019 <0.030 <0.030 ng/mL Final     BNP   Date Value Ref Range Status   05/30/2017 543 (H) 0 - 99 pg/mL        Lipids:    Lab Results   Component Value Date    CHOL 171 05/23/2022    CHOL 147 06/07/2021    CHOL 108 05/22/2020     Lab Results   Component Value Date    HDL 48 05/23/2022    HDL 47 06/07/2021    HDL 39 05/22/2020     Lab Results   Component Value Date    LDLCALC 96 05/23/2022    LDLCALC 80 06/07/2021    LDLCALC 55 05/22/2020     Lab Results   Component Value Date    TRIG 154 05/23/2022    TRIG 109 06/07/2021    TRIG 72 05/22/2020        TSH:   Lab Results   Component Value Date    TSH 0.740 06/24/2016     Magnesium:   Lab Results   Component Value Date    MG 1.9 03/12/2019     Protime-INR:   Lab Results   Component Value Date    PT 16.1 (H) 07/07/2017    INR 1.3 (H) 07/07/2017     A1c: No results found for: \"HGBA1C\"          Assessment/Plan     1. Atrial fibrillation and flutter (CMS/[facility])    2. Bilateral carotid artery stenosis    3. PAD (peripheral artery disease) (CMS/[facility])    4. Mixed hyperlipidemia    5. Primary hypertension    6. Nonrheumatic aortic (valve) stenosis          Atrial fibrillation:  Patient in permanent atrial fibrillation, rate controlled.  Asymptomatic.  Continue metoprolol succinate 100 mg daily.  Advised that she should take this at night to diminish chance of side effects  Continue Eliquis 5 mg twice daily.    Carotid artery disease  Peripheral arterial disease:  Currently on Repatha.  Surveillance ultrasound showing patent carotid artery stents, severe right SFA stenosis with patent left femoral-popliteal arterial segment  Patient was recently seen by vascular medicine.  Advised patient to restart taking aspirin due to extensive nature of her PAD.  " At a minimum, would take this every other day if she experiences significant bleeding episodes.    Aortic stenosis:  Nonrheumatic aortic stenosis noted on echocardiogram.  Mild.  Will repeat surveillance echo in 1 year    Mixed hyperlipidemia:  Lipids reviewed.  LDL normal at 70.  On Repatha.  Doing well.  Has known history of statin intolerance  Continue Repatha      Return to clinic in 1 year.      Sincerely,    Electronically signed by: Ruben Vazquez MD  7/30/2024  11:56 AM      On this date of service 32 minutes of total time was spent in evaluation and management on this encounter.  On this date of service 5 minutes were spent in other reportable services.

## 2024-09-30 ENCOUNTER — OFFICE VISIT (OUTPATIENT)
Dept: URBAN - METROPOLITAN AREA CLINIC 7 | Facility: CLINIC | Age: 84
End: 2024-09-30
Payer: MEDICARE

## 2024-09-30 ENCOUNTER — TELEPHONE (OUTPATIENT)
Dept: CARDIOLOGY | Facility: CLINIC | Age: 84
End: 2024-09-30
Payer: MEDICARE

## 2024-09-30 DIAGNOSIS — H35.3114 NONEXUDATIVE AGE-RELATED MACULAR DEGENERATION, RIGHT EYE, ADVANCED ATROPHIC WITH SUBFOVEAL INVOLVEMENT: ICD-10-CM

## 2024-09-30 DIAGNOSIS — H43.813 VITREOUS DEGENERATION, BILATERAL: ICD-10-CM

## 2024-09-30 DIAGNOSIS — H35.3221 EXUDATIVE AGE-RELATED MACULAR DEGENERATION, LEFT EYE, WITH ACTIVE CHOROIDAL NEOVASCULARIZATION: Primary | ICD-10-CM

## 2024-09-30 DIAGNOSIS — Z96.1 PRESENCE OF INTRAOCULAR LENS: ICD-10-CM

## 2024-09-30 PROCEDURE — 99214 OFFICE O/P EST MOD 30 MIN: CPT | Performed by: OPHTHALMOLOGY

## 2024-09-30 PROCEDURE — 92134 CPTRZ OPH DX IMG PST SGM RTA: CPT | Performed by: OPHTHALMOLOGY

## 2024-09-30 PROCEDURE — 67028 INJECTION EYE DRUG: CPT | Performed by: OPHTHALMOLOGY

## 2024-09-30 ASSESSMENT — INTRAOCULAR PRESSURE
OD: 13
OS: 12

## 2024-10-02 ENCOUNTER — TELEPHONE (OUTPATIENT)
Dept: CARDIOLOGY | Facility: CLINIC | Age: 84
End: 2024-10-02
Payer: MEDICARE

## 2024-10-02 NOTE — TELEPHONE ENCOUNTER
Patient made a $50.00 Copay payment on 7/25/24 and then was billed for it in September.  She is wondering why so I sent it for bill review.  10/2/24bg

## 2024-11-11 ENCOUNTER — OFFICE VISIT (OUTPATIENT)
Dept: URBAN - METROPOLITAN AREA CLINIC 7 | Facility: CLINIC | Age: 84
End: 2024-11-11
Payer: MEDICARE

## 2024-11-11 DIAGNOSIS — H35.3221 EXUDATIVE AGE-RELATED MACULAR DEGENERATION, LEFT EYE, WITH ACTIVE CHOROIDAL NEOVASCULARIZATION: Primary | ICD-10-CM

## 2024-11-11 PROCEDURE — 92134 CPTRZ OPH DX IMG PST SGM RTA: CPT | Performed by: OPHTHALMOLOGY

## 2024-11-11 PROCEDURE — 67028 INJECTION EYE DRUG: CPT | Performed by: OPHTHALMOLOGY

## 2024-11-11 ASSESSMENT — INTRAOCULAR PRESSURE
OS: 13
OD: 14

## 2024-12-30 ENCOUNTER — OFFICE VISIT (OUTPATIENT)
Dept: URBAN - METROPOLITAN AREA CLINIC 7 | Facility: CLINIC | Age: 84
End: 2024-12-30
Payer: MEDICARE

## 2024-12-30 DIAGNOSIS — H35.3221 EXUDATIVE AGE-RELATED MACULAR DEGENERATION, LEFT EYE, WITH ACTIVE CHOROIDAL NEOVASCULARIZATION: Primary | ICD-10-CM

## 2024-12-30 PROCEDURE — 92134 CPTRZ OPH DX IMG PST SGM RTA: CPT | Performed by: OPHTHALMOLOGY

## 2024-12-30 PROCEDURE — 67028 INJECTION EYE DRUG: CPT | Performed by: OPHTHALMOLOGY

## 2024-12-30 ASSESSMENT — INTRAOCULAR PRESSURE
OS: 17
OD: 15

## 2025-02-14 ENCOUNTER — OFFICE VISIT (OUTPATIENT)
Dept: URBAN - METROPOLITAN AREA CLINIC 7 | Facility: CLINIC | Age: 85
End: 2025-02-14
Payer: MEDICARE

## 2025-02-14 DIAGNOSIS — H43.813 VITREOUS DEGENERATION, BILATERAL: ICD-10-CM

## 2025-02-14 DIAGNOSIS — H35.3221 EXUDATIVE AGE-RELATED MACULAR DEGENERATION, LEFT EYE, WITH ACTIVE CHOROIDAL NEOVASCULARIZATION: Primary | ICD-10-CM

## 2025-02-14 DIAGNOSIS — Z96.1 PRESENCE OF INTRAOCULAR LENS: ICD-10-CM

## 2025-02-14 DIAGNOSIS — H35.3114 NONEXUDATIVE AGE-RELATED MACULAR DEGENERATION, RIGHT EYE, ADVANCED ATROPHIC WITH SUBFOVEAL INVOLVEMENT: ICD-10-CM

## 2025-02-14 PROCEDURE — 92014 COMPRE OPH EXAM EST PT 1/>: CPT | Performed by: OPHTHALMOLOGY

## 2025-02-14 PROCEDURE — 67028 INJECTION EYE DRUG: CPT | Performed by: OPHTHALMOLOGY

## 2025-02-14 PROCEDURE — 92134 CPTRZ OPH DX IMG PST SGM RTA: CPT | Performed by: OPHTHALMOLOGY

## 2025-02-14 ASSESSMENT — INTRAOCULAR PRESSURE
OD: 13
OS: 12

## 2025-03-31 ENCOUNTER — OFFICE VISIT (OUTPATIENT)
Dept: URBAN - METROPOLITAN AREA CLINIC 7 | Facility: CLINIC | Age: 85
End: 2025-03-31
Payer: MEDICARE

## 2025-03-31 DIAGNOSIS — H35.3221 EXUDATIVE AGE-RELATED MACULAR DEGENERATION, LEFT EYE, WITH ACTIVE CHOROIDAL NEOVASCULARIZATION: Primary | ICD-10-CM

## 2025-03-31 PROCEDURE — 92134 CPTRZ OPH DX IMG PST SGM RTA: CPT | Performed by: OPHTHALMOLOGY

## 2025-03-31 PROCEDURE — 67028 INJECTION EYE DRUG: CPT | Performed by: OPHTHALMOLOGY

## 2025-03-31 ASSESSMENT — INTRAOCULAR PRESSURE
OS: 12
OD: 13

## 2025-04-28 ENCOUNTER — OFFICE VISIT (OUTPATIENT)
Dept: URBAN - METROPOLITAN AREA CLINIC 7 | Facility: CLINIC | Age: 85
End: 2025-04-28
Payer: MEDICARE

## 2025-04-28 DIAGNOSIS — H35.3221 EXUDATIVE AGE-RELATED MACULAR DEGENERATION, LEFT EYE, WITH ACTIVE CHOROIDAL NEOVASCULARIZATION: Primary | ICD-10-CM

## 2025-04-28 PROCEDURE — 67028 INJECTION EYE DRUG: CPT | Performed by: OPHTHALMOLOGY

## 2025-04-28 PROCEDURE — 92134 CPTRZ OPH DX IMG PST SGM RTA: CPT | Performed by: OPHTHALMOLOGY

## 2025-06-09 ENCOUNTER — OFFICE VISIT (OUTPATIENT)
Dept: URBAN - METROPOLITAN AREA CLINIC 7 | Facility: CLINIC | Age: 85
End: 2025-06-09
Payer: MEDICARE

## 2025-06-09 DIAGNOSIS — H35.3221 EXUDATIVE AGE-RELATED MACULAR DEGENERATION, LEFT EYE, WITH ACTIVE CHOROIDAL NEOVASCULARIZATION: Primary | ICD-10-CM

## 2025-06-09 DIAGNOSIS — H43.813 VITREOUS DEGENERATION, BILATERAL: ICD-10-CM

## 2025-06-09 DIAGNOSIS — Z96.1 PRESENCE OF INTRAOCULAR LENS: ICD-10-CM

## 2025-06-09 DIAGNOSIS — H35.3114 NONEXUDATIVE AGE-RELATED MACULAR DEGENERATION, RIGHT EYE, ADVANCED ATROPHIC WITH SUBFOVEAL INVOLVEMENT: ICD-10-CM

## 2025-06-09 PROCEDURE — 92014 COMPRE OPH EXAM EST PT 1/>: CPT | Performed by: OPHTHALMOLOGY

## 2025-06-09 PROCEDURE — 92134 CPTRZ OPH DX IMG PST SGM RTA: CPT | Performed by: OPHTHALMOLOGY

## 2025-06-09 PROCEDURE — 67028 INJECTION EYE DRUG: CPT | Performed by: OPHTHALMOLOGY

## 2025-06-09 ASSESSMENT — INTRAOCULAR PRESSURE
OS: 11
OD: 12

## 2025-07-28 PROBLEM — I48.91 ATRIAL FIBRILLATION WITH RVR (CMS/HCC): Status: RESOLVED | Noted: 2019-03-11 | Resolved: 2025-07-28

## 2025-07-28 PROBLEM — I35.0 NONRHEUMATIC AORTIC VALVE STENOSIS: Status: ACTIVE | Noted: 2025-07-28

## 2025-07-28 PROBLEM — I48.92 ATRIAL FIBRILLATION AND FLUTTER (CMS/HCC): Status: RESOLVED | Noted: 2017-10-30 | Resolved: 2025-07-28

## 2025-07-28 PROBLEM — I48.91 ATRIAL FIBRILLATION AND FLUTTER (CMS/HCC): Status: RESOLVED | Noted: 2017-10-30 | Resolved: 2025-07-28

## 2025-07-28 PROBLEM — I48.21 PERMANENT ATRIAL FIBRILLATION (CMS/HCC): Status: ACTIVE | Noted: 2025-07-28

## 2025-07-28 NOTE — PROGRESS NOTES
Cardiology Outpatient Note                                            ABIGAIL French Notes:     Chief Complaint   Patient presents with    F/U       Assessment/ Plan:    Permanent atrial fibrillation (CMS/HCC)  Permanent atrial fibrillation with adequate rate control.  Stable for palpitations or syncopal symptoms.  Continue metoprolol succinate 100 mg daily.  [id number] score 5, continue Eliquis 5 mg twice daily.    Nonrheumatic aortic valve stenosis  Surveillance echocardiography today 7/30/2025 shows mild-moderate AS, OTTO 1.1 cm2, peak velocity 2.79 m/s, mean gradient 17 mmHg.  Continue with interval monitoring.    Blood pressure and lipids at goal.    Atherosclerosis of native artery of both lower extremities with intermittent claudication (CMS/HCC)  PVD (peripheral vascular disease) (CMS/[facility])  Carotid artery disease  History of 75-90% stenosis in the right SFA following angioplasty and arthrectomy on 9/2019. Followed annually by vascular with last visit on 7/25/2024.  Ensure patient has annual follow-up scheduled.   Carotid duplex 7/25/2024 showing chronically occluded right ICA with patent left carotid stent.    Lower extremity arterial duplex shows severe right SFA stenosis with patent left femoral-popliteal arterial segment.  Continues aspirin.   Clinically stable without concerns for worsening claudication  Plan for updated surveillance imaging with repeat carotid, iliac, and bilateral lower extremity duplex.    Primary hypertension  Blood pressure today 128/82, well-controlled on metoprolol succinate 50 mg.    Mixed hyperlipidemia  History of statin intolerance.  Target LDL 70 or less. Request sent for lipid panel done yesterday through [facility].   Continues Repatha.    Addendum: Lipid panel 7/30/2025; total cholesterol 186, HDL 50, .  LDL significantly above goal.  Our office to call to confirm consistent Repatha dosing, if taking consistently as directed recommend adjunct with Zetia 10 mg  once daily in addition to Repatha.                History of Present Illness  HPI:Belkis Burris is a 84 y.o. female who follows with primary cardiologist Dr. Vazquez and last evaluated in office on 7/30/2024.  Past medical history significant for permanent atrial fibrillation, carotid artery stenosis with left ICA stent, and left common iliac stent with distal SFA drug-coated balloon angioplasty in 2019, and dyslipidemia with statin intolerance.  She presents to general cardiology clinic today for routine follow-up.    She has no new cardiac concerns compared to last year. No chest pain, worsening dyspnea on exertion, lightheadedness, or presyncope. Her aortic valve stenosis is being monitored, with the last echocardiogram in 2023 showing an ejection fraction of 59%. A recent echocardiogram indicates an ejection fraction of 57%, with the aortic valve stenosis progressing from mild to mild-moderate, a valve area of 1.1 cm², and peak velocity of 2.8 m/s.    She maintains an active lifestyle, living independently, performing household chores, and going for daily walks, although the heat in [place] has recently limited her ability to walk. She also drives to run errands and visits her brother regularly. She continues to walk approximately 1 mile daily.     She reports mild leg cramping last week, but no current discomfort with rest or exertion.  Overall, denies worsening claudication.  Denies new or recent TIA symptoms.    She has a history of high cholesterol and is currently on Repatha injections. Her LDL was 67 mg/dL in August 2024, and her total cholesterol was 144 mg/dL. Recent cholesterol levels were checked, but the results are not yet available. She is also on Eliquis and aspirin and denies bleeding concerns.            CARDIAC PROBLEM LIST:  Primary Cardiologist: Dr. Gallardo       I. CARDIAC   1. Permanent atrial fibrillation   A. On anticoagulation     2. Statin intolerance       II. VASCULAR   1.  Carotid artery stenosis   A. Left internal carotid artery stent   B. Right internal carotid artery occlusion     2. PAD   A. Left common iliac stent, left distal SFA DCB (July 2019)   B. Right mid and distal DCB (September 2019)       IV. OTHER   1. Dyslipidemia     Last updated: Addy Cox PA-C 8/24/21     Review of Systems:  The following system(s) were reviewed and negative.  Pertinent positive and negative findings are noted in the HPI.    [x]                     Const                                   [x]                      Eyes   [x]                     ENT                                     [x]                      Resp                                       [x]                     CV                                       [x]                      GI   [x]                                                            [x]                      Neuro   [x]                     Musc                                    [x]                      Skin   [x]                     Psych                                  [x]                      Endo   [x]                     Allergy                                 [x]                      Heme/Lymph.    Histories:  Past Medical History:   Diagnosis Date    A-fib (CMS/[facility])     A-fib (CMS/[facility])     Cardiovascular disease     Complication of anesthesia     Delayed emergence from general anesthesia     DVT (deep venous thrombosis) (CMS/[facility])     Dysrhythmia     Afib    GERD (gastroesophageal reflux disease) 5/2/2020    Hypercholesteremia     Scoliosis     TIA (transient ischemic attack)     Wears dentures     upper    Wears partial dentures     lower     Past Surgical History:   Procedure Laterality Date    AIFF ANGIO Bilateral 7/24/2019    Procedure: Aiff Angio;  Surgeon: Kailash Gallardo MD;  Location: [facility] Cath Lab;  Service: Peripheral Vascular;  Laterality: Bilateral;  Late case #1 at 0830    AIFF ANGIO Right 9/24/2019    Procedure: Aiff Angio staged;  Surgeon:  Kailash Gallardo MD;  Location: [facility] Cath Lab;  Service: Peripheral Vascular;  Laterality: Right;    ATHERECTOMY - PERIPHERAL N/A 2019    Procedure: Atherectomy - peripheral;  Surgeon: Kailash Gallardo MD;  Location: [facility] Cath Lab;  Service: Peripheral Vascular;  Laterality: N/A;    ATHERECTOMY - PERIPHERAL N/A 2019    Procedure: Atherectomy - peripheral;  Surgeon: Kailash Gallardo MD;  Location: [facility] Cath Lab;  Service: Peripheral Vascular;  Laterality: N/A;    CAROTID STENT      EYE SURGERY  2024    PERIPHERAL ARTERIAL STENT GRAFT N/A 2019    Procedure: Peripheral artery stenting;  Surgeon: Kailash Gallardo MD;  Location: [facility] Cath Lab;  Service: Peripheral Vascular;  Laterality: N/A;    TONSILLECTOMY      TUBAL LIGATION       Family History   Problem Relation Age of Onset    Other Mother         Complications of TB and emphysema, Cause of Death    Blood Clots Father         Cause of Death    Arrhythmia Sister     Other Brother         Carotid endarterectomy     Social History     Socioeconomic History    Marital status:    Tobacco Use    Smoking status: Former     Current packs/day: 0.00     Average packs/day: 0.5 packs/day for 10.0 years (5.0 ttl pk-yrs)     Types: Cigarettes     Start date:      Quit date: 2017     Years since quittin.5    Smokeless tobacco: Never   Vaping Use    Vaping status: Never Used   Substance and Sexual Activity    Alcohol use: Not Currently    Drug use: Never    Sexual activity: Not Currently     Social Drivers of Health     Tobacco Use: Medium Risk (2025)    Patient History     Smoking Tobacco Use: Former     Smokeless Tobacco Use: Never   Depression: Not at risk (2020)    Received from [facility]    PHQ-2     Initial Screen Total: 0    Received from [facility]    [facility]     Social History     Tobacco Use   Smoking Status Former    Current packs/day: 0.00    Average packs/day: 0.5 packs/day for 10.0 years  "(5.0 ttl pk-yrs)    Types: Cigarettes    Start date:     Quit date: 2017    Years since quittin.5   Smokeless Tobacco Never     Social History     Substance and Sexual Activity   Drug Use Never       Medications:   Current Outpatient Medications   Medication Sig Dispense Refill    omeprazole (PriLOSEC) 20 mg tablet,delayed release (DR/EC) Take 1 tablet (20 mg total) by mouth every morning before breakfast      metoprolol succinate XL (TOPROL-XL) 50 mg 24 hr tablet Take 2 tablets (100 mg total) by mouth daily      Repatha SureClick 140 mg/mL Inject 1 mL (140 mg total) under the skin every 14 (fourteen) days      aspirin 81 mg EC tablet Take 1 tablet (81 mg total) by mouth daily      Lactobacillus acidophilus 10 billion cell capsule Take 1 capsule by mouth daily        apixaban (ELIQUIS) 5 mg tablet Take 1 tablet (5 mg total) by mouth 2 (two) times a day      dexlansoprazole (DEXILANT) 30 mg capsule Take 1 capsule (30 mg total) by mouth daily (Patient not taking: Reported on 2025)       No current facility-administered medications for this visit.       Allergies:  Allergies   Allergen Reactions    Amoxicillin Itching    Metronidazole Hives    Statins-Hmg-Coa Reductase Inhibitors Muscle Pain    Sulfa (Sulfonamide Antibiotics) Itching     I have reviewed and confirmed Belkis Burris's   allergies this visit.     Objective:    Vitals:    25 1443   BP: 128/82   Pulse: 74   SpO2: 97%   Height: 1.575 m (5' 2\")   Weight: 77.1 kg (170 lb)   PainSc: 0-No pain   Patient Position: Sitting        General: Well-appearing female who is awake, alert, and oriented x3.  In no acute distress. Appears stated age.   Skin: Warm, dry, intact without obvious rashes or lesions. No significant ecchymoses visualized.   Head:  Normocephalic, atraumatic. Face symmetrical. Audition intact to spoken speech.   Neck:  Supple. Carotid pulses equal with rapid upstroke, no bruits. No JVD appreciable.   Cardiovascular: Irregularly " irregular rhythm, regular rate. Normal S1, S2. 2/6 systolic murmur LUSB.   Respiratory: Normal respiratory effort. Lung sounds clear bilaterally without wheezing, rhonchi or rales.  Abdomen:  Soft, non-distended. Bowel sounds present. No significant bruits.   Extremities: No edema. No clubbing or cyanosis. PT pulses intact and equal. Mild varicosities with no other significant skin changes.    Musculoskeletal:  Able to move all 4 extremities spontaneously.  Neurological:  No gross sensory or motor deficits noted. Normal speech observed.     Data Review:     Sodium   Date Value Ref Range Status   05/23/2022 136 mmol/L Final   09/25/2020 134 (L) 135 - 145 mmol/L Final     Potassium   Date Value Ref Range Status   05/23/2022 4.7 mmol/L Final   09/25/2020 3.9 3.6 - 5.0 mmol/L Final     Chloride   Date Value Ref Range Status   05/23/2022 101 mmol/L Final   09/25/2020 93 (L) 98 - 107 mmol/L Final     CO2   Date Value Ref Range Status   05/23/2022 23 mmol/L Final   09/25/2020 26 21 - 32 mmol/L Final     BUN   Date Value Ref Range Status   05/23/2022 18 mg/dL Final   09/25/2020 16 7 - 25 mg/dL Final     Creatinine   Date Value Ref Range Status   05/23/2022 0.77 mg/dL Final   09/25/2020 0.80 0.60 - 1.10 mg/dL Final     Glucose   Date Value Ref Range Status   05/23/2022 105 mg/dL Final   09/25/2020 118 (H) 70 - 105 mg/dL Final     Calcium   Date Value Ref Range Status   05/23/2022 10 mg/dL Final   09/25/2020 9.4 8.6 - 10.1 mg/dL Final      hsTnI 0 Hour   Date Value Ref Range Status   09/20/2020 6.4 <=14.9 pg/mL Final     BNP   Date Value Ref Range Status   05/30/2017 543 (H) 0 - 99 pg/mL       WBC   Date Value Ref Range Status   09/25/2020 8.9 4.5 - 10.5 10*3/uL Final     Hemoglobin   Date Value Ref Range Status   09/25/2020 15.0 11.5 - 15.5 g/dL Final     Hematocrit   Date Value Ref Range Status   09/25/2020 43.0 34.0 - 45.0 % Final     MCV   Date Value Ref Range Status   09/25/2020 86.3 81.0 - 97.0 fL Final     Platelets    Date Value Ref Range Status   09/25/2020 222 140 - 350 10*3/uL Final      Total cholesterol   Date Value Ref Range Status   11/07/2019 285 (A) <=200 mg/dL Final     Cholesterol   Date Value Ref Range Status   05/23/2022 171 mg/dL Final     Triglycerides   Date Value Ref Range Status   05/23/2022 154 mg/dL Final   11/07/2019 131 <=150 mg/dL Final     HDL   Date Value Ref Range Status   05/23/2022 48 mg/dL Final   11/07/2019 42 40 - 60 mg/dL Final     LDL Direct   Date Value Ref Range Status   07/25/2024 70 <=99 mg/dL Final     LDL Calculated   Date Value Ref Range Status   05/23/2022 96 mg/dL Final   05/28/2017 96 <100 mg/dL             Return in about 1 year (around 7/30/2026) for 1 year with Dr. Vazquez/Judy Wyatt CNP .     Sincerely,    Fanny Baker PA-C  07/30/25

## 2025-07-30 ENCOUNTER — OFFICE VISIT (OUTPATIENT)
Dept: CARDIOLOGY | Facility: CLINIC | Age: 85
End: 2025-07-30
Payer: COMMERCIAL

## 2025-07-30 ENCOUNTER — ANCILLARY PROCEDURE (OUTPATIENT)
Dept: CARDIOLOGY | Facility: CLINIC | Age: 85
End: 2025-07-30
Payer: COMMERCIAL

## 2025-07-30 VITALS
SYSTOLIC BLOOD PRESSURE: 128 MMHG | HEIGHT: 62 IN | WEIGHT: 170 LBS | BODY MASS INDEX: 31.28 KG/M2 | HEART RATE: 74 BPM | OXYGEN SATURATION: 97 % | DIASTOLIC BLOOD PRESSURE: 82 MMHG

## 2025-07-30 DIAGNOSIS — I73.9 PERIPHERAL ARTERIAL DISEASE (CMS/HCC): ICD-10-CM

## 2025-07-30 DIAGNOSIS — I48.92 ATRIAL FIBRILLATION AND FLUTTER (CMS/HCC): ICD-10-CM

## 2025-07-30 DIAGNOSIS — I35.0 NONRHEUMATIC AORTIC VALVE STENOSIS: ICD-10-CM

## 2025-07-30 DIAGNOSIS — E78.2 MIXED HYPERLIPIDEMIA: Chronic | ICD-10-CM

## 2025-07-30 DIAGNOSIS — I48.21 PERMANENT ATRIAL FIBRILLATION (CMS/HCC): ICD-10-CM

## 2025-07-30 DIAGNOSIS — I10 PRIMARY HYPERTENSION: ICD-10-CM

## 2025-07-30 DIAGNOSIS — I48.91 ATRIAL FIBRILLATION AND FLUTTER (CMS/HCC): ICD-10-CM

## 2025-07-30 DIAGNOSIS — I70.213 ATHEROSCLEROSIS OF NATIVE ARTERY OF BOTH LOWER EXTREMITIES WITH INTERMITTENT CLAUDICATION (CMS/HCC): Primary | ICD-10-CM

## 2025-07-30 DIAGNOSIS — I73.9 PVD (PERIPHERAL VASCULAR DISEASE) (CMS/HCC): ICD-10-CM

## 2025-07-30 DIAGNOSIS — I65.23 BILATERAL CAROTID ARTERY STENOSIS: ICD-10-CM

## 2025-07-30 LAB
ASCENDING AORTA: 2.92 CM
AV LVOT PEAK GRADIENT: 4.1 MMHG
AV MEAN PRESS GRAD SYS DOP V1V2: 17.43 MMHG
AV PEAK GRADIENT: 31.28 MMHG
AV VMAX SYS DOP: 2.79 M/S
DOP CALC AO VTI: 63.2 CM
DOP CALC LVOT DIAMETER: 1.95 CM
DOP CALC LVOT STROKE VOLUME: 66 CM3
DOP CALC MV VTI: 28.1 CM
DOP CALC RVOT PEAK VEL: 0.6 M/S
DOP CALCLVOT PEAK VEL VTI: 22 CM
E/E' RATIO (AVERAGE): 9.4
E/E' RATIO: 9.8
ERAP: 5 MMHG
INTERVENTRICULAR SEPTUM: 1.4 CM (ref 0.6–1.1)
IVC PROX: 1.64 CM
LA AREA A4C SYSTOLE: 42.9 CM3
LEFT ATRIUM SIZE: 4.26 CM
LEFT ATRIUM VOLUME INDEX: 31 ML/M2
LEFT ATRIUM VOLUME: 55.5 CM3
LEFT INTERNAL DIMENSION IN SYSTOLE: 2.9 CM (ref 2.1–4)
LEFT VENTRICLE DIASTOLIC VOLUME INDEX: 31.2 ML/M2
LEFT VENTRICLE DIASTOLIC VOLUME: 56 CM3
LEFT VENTRICLE SYSTOLIC VOLUME INDEX: 13.5 ML/M2
LEFT VENTRICLE SYSTOLIC VOLUME: 25 CM3
LEFT VENTRICULAR INTERNAL DIMENSION IN DIASTOLE: 4.1 CM (ref 3.5–6)
LV EF BIPLANE MOD: 57 %
LVAD-AP2: 21 CM2
ML OF DILUTED DEFINITY INJECTED: 2 ML
MV DEC SLOPE: 5950 MM/S2
MV DT: 222 MS
MV MEAN GRADIENT: 2.5 MMHG
MV PEAK E VEL: 113 CM/S
MV PEAK GRADIENT: 7 MMHG
MV STENOSIS PRESSURE HALF TIME: 71 MS
MV VALVE AREA P 1/2 METHOD: 3.1 CM2
MV VMAX: 135 CM/S
MVA (VTI): 2.34 CM2
POSTERIOR WALL: 1.2 CM (ref 0.6–1.1)
PULM VEIN S/D RATIO: 1
PV PEAK D VEL: 72 CM/S
PV PEAK GRADIENT: 11.31 MMHG
PV PEAK S VEL: 72 CM/S
PV VMAX: 1.68 M/S
RA AREA: 23.5 CM2
RH ABDOMINAL AORTA: 1.89 CM
RH CV ECHO AV VALVE AREA VEL: 1.1 CM2
RH CV ECHO AV VALVE AREA VTI: 1 CM2
RH LVOT PEAK VELOCITY REST: 1 M/S
RIGHT VENTRICULAR INTERNAL DIMENSION IN DIASTOLE: 2.9 CM
RV AP4 BASE: 2.9 CM
S': 10 CM/S
TDI: 11.5 CM/S
TDILATERAL: 12.5 CM/S
TR MAX PG: 37.95 MMHG
TRICUSPID ANNULAR PLANE SYSTOLIC EXCURSION: 1.2 CM
TRICUSPID VALVE PEAK REGURGITATION VELOCITY: 3.1 M/S
TV REST PULMONARY ARTERY PRESSURE: 43 MMHG

## 2025-07-30 PROCEDURE — 93306 TTE W/DOPPLER COMPLETE: CPT | Mod: 26 | Performed by: STUDENT IN AN ORGANIZED HEALTH CARE EDUCATION/TRAINING PROGRAM

## 2025-07-30 PROCEDURE — 93005 ELECTROCARDIOGRAM TRACING: CPT

## 2025-07-30 PROCEDURE — 93306 TTE W/DOPPLER COMPLETE: CPT

## 2025-07-30 PROCEDURE — G0463 HOSPITAL OUTPT CLINIC VISIT: HCPCS

## 2025-07-30 PROCEDURE — 99214 OFFICE O/P EST MOD 30 MIN: CPT

## 2025-07-30 RX ORDER — PHENOL/SODIUM PHENOLATE
20 AEROSOL, SPRAY (ML) MUCOUS MEMBRANE
COMMUNITY

## 2025-07-30 ASSESSMENT — ENCOUNTER SYMPTOMS
COLOR CHANGE: 1
BACK PAIN: 1
BRUISES/BLEEDS EASILY: 1
IRREGULAR HEARTBEAT: 1
FOCAL WEAKNESS: 1

## 2025-07-30 ASSESSMENT — PAIN SCALES - GENERAL: PAINLEVEL_OUTOF10: 0-NO PAIN

## 2025-07-31 ENCOUNTER — ANCILLARY PROCEDURE (OUTPATIENT)
Dept: CARDIOLOGY | Facility: CLINIC | Age: 85
End: 2025-07-31
Payer: COMMERCIAL

## 2025-07-31 ENCOUNTER — TELEPHONE (OUTPATIENT)
Dept: CARDIOLOGY | Facility: CLINIC | Age: 85
End: 2025-07-31

## 2025-07-31 ENCOUNTER — TELEPHONE (OUTPATIENT)
Dept: CARDIOLOGY | Facility: CLINIC | Age: 85
End: 2025-07-31
Payer: COMMERCIAL

## 2025-07-31 DIAGNOSIS — I70.213 ATHEROSCLEROSIS OF NATIVE ARTERY OF BOTH LOWER EXTREMITIES WITH INTERMITTENT CLAUDICATION (CMS/HCC): ICD-10-CM

## 2025-07-31 DIAGNOSIS — I73.9 PERIPHERAL ARTERIAL DISEASE (CMS/HCC): ICD-10-CM

## 2025-07-31 DIAGNOSIS — I73.9 PVD (PERIPHERAL VASCULAR DISEASE) (CMS/HCC): ICD-10-CM

## 2025-07-31 DIAGNOSIS — I65.23 BILATERAL CAROTID ARTERY STENOSIS: ICD-10-CM

## 2025-07-31 LAB
ABDOMINAL LT EXT ILIAC VEL: 194
ABDOMINAL LT INT ILIAC VEL: 74
ABDOMINAL RT EXT ILIAC VEL: 133
ABDOMINAL RT INT ILIAC VEL: 76
ANTDD1607: NORMAL
ANTDD1844: NORMAL
ANTPD1607: NORMAL
ANTPD1844: NORMAL
CFD1844: NORMAL
COMFEMD1607: NORMAL
LEFT ANT TIBIAL SYS PSV: 39 CM/S
LEFT PERONEAL SYS PSV: 69 CM/S
LEFT POST TIBIAL SYS PSV: 110 CM/S
LEFT PROFUNDA SYS PSV: 110 CM/S
LEFT SUPER FEMORAL PROX SYS PSV: 118 CM/S
LEFT TIB/PER TRUNK SYS PSV: 50 CM/S
LL ARTERIAL DUPLEX COMMON FEMORAL PEAK SYS VEL: 116 CM/S
LL ARTERIAL DUPLEX POPLITEAL PEAK SYS VEL: 55 CM/S
LTDCFA1803: NORMAL
LTDCIA1803: NORMAL
LTDEIA1803: NORMAL
LTDIIA1803: NORMAL
LTDPFAO1803: NORMAL
LTDSFAO1803: NORMAL
PERDD1607: NORMAL
PERDD1844: NORMAL
PERPD1607: NORMAL
PERPD1844: NORMAL
POPDD1844: NORMAL
POPDISTD1607: NORMAL
POPPD1844: NORMAL
POPPROXD1607: NORMAL
POSTTIBDD1607: NORMAL
POSTTIBPD1607: NORMAL
PROD1844: NORMAL
PROFD1607: NORMAL
PTDD1844: NORMAL
PTPD1844: NORMAL
RH AO ENDOGRAFT LT CIA: 111
RH LT ILIAC DUP CIA DIST: 87
RH LT ILIAC DUP CIA MID: 91
RH LT ILIAC DUP DIST AO: 41
RH LT ILIAC DUP EIA DIST: 111
RH LT ILIAC DUP EIA MID: 137
RH LT LOWER ART ANT TIB DIST: 60 CM/S
RH LT LOWER ART PER DIST: 34 CM/S
RH LT LOWER ART POP DIST: 65 CM/S
RH LT LOWER ART POST TIB DIST: 84 CM/S
RH LT LOWER ART SFA DIST: 149 CM/S
RH LT LOWER ART SFA MID: 104 CM/S
RH RT ILIAC DUP CIA DIST: 128
RH RT ILIAC DUP CIA MID: 157
RH RT ILIAC DUP CIA PROX: 119
RH RT ILIAC DUP DIST AO: 41
RH RT ILIAC DUP EIA DIST: 108
RH RT ILIAC DUP EIA MID: 120
RH RT LOWER ART ANT TIB DIST: 55 CM/S
RH RT LOWER ART ANT TIBIAL SYS PSV: 62 CM/S
RH RT LOWER ART PER DIST: 47 CM/S
RH RT LOWER ART POP DIST: 72 CM/S
RH RT LOWER ART POST TIB DIST: 41 CM/S
RH RT LOWER ART SFA DIST RATIO: 3.63
RH RT LOWER ART SFA DIST: 258 CM/S
RH RT LOWER ART SFA MID: 71 CM/S
RIGHT PERONEAL SYS PSV: 60 CM/S
RIGHT POST TIBIAL SYS PSV: 59 CM/S
RIGHT PROFUNDA SYS PSV: 66 CM/S
RIGHT SUPER FEMORAL PROX SYS PSV: 88 CM/S
RIGHT TIB/PER TRUNK SYS PSV: 63 CM/S
RL ARTERIAL DUPLEX COMMON FEMORAL PEAK SYS VEL: 101 CM/S
RL ARTERIAL DUPLEX POPLITEAL PEAK SYS VEL: 65 CM/S
RTDCFA1801: NORMAL
RTDCIA1801: NORMAL
RTDEIA1801: NORMAL
RTDIIA1801: NORMAL
RTDPFAO1801: NORMAL
RTDSFAO1801: NORMAL
SFADD1607: NORMAL
SFADD1844: NORMAL
SFADS1844: NORMAL
SFAMD1607: NORMAL
SFAMD1844: NORMAL
SFAPD1607: NORMAL
SFAPD1844: NORMAL
TIBD1607: NORMAL
TIBD1844: NORMAL

## 2025-07-31 PROCEDURE — 93978 VASCULAR STUDY: CPT

## 2025-07-31 PROCEDURE — 93880 EXTRACRANIAL BILAT STUDY: CPT

## 2025-07-31 PROCEDURE — 93925 LOWER EXTREMITY STUDY: CPT

## 2025-07-31 NOTE — TELEPHONE ENCOUNTER
Requested lipid panel from [facility] per Fanny's request. Called [facility] scheduling to arrange arterial US. They will call patient to arrange.

## 2025-07-31 NOTE — TELEPHONE ENCOUNTER
Patient calls in wanting ultrasound results from yesterday.  I did inform the patient that typically takes at least a week for the doctor to review the results and then they go to the ordering provider who would send a message to the nurse he would then contact the patient with the results.  Patient verbalized understanding no further questions.

## 2025-08-01 ENCOUNTER — TELEPHONE (OUTPATIENT)
Dept: CARDIOLOGY | Facility: CLINIC | Age: 85
End: 2025-08-01
Payer: COMMERCIAL

## 2025-08-01 LAB
LEFT CCA DIST DIAS: 18 CM/S
LEFT CCA DIST SYS: 103 CM/S
LEFT CCA MID DIAS: 16 CM/S
LEFT CCA MID SYS: 85 CM/S
LEFT CCA PROX DIAS: 25
LEFT CCA PROX SYS: 102 CM/S
LEFT ECA DIAS: 13 CM/S
LEFT ECA SYS: 110 CM/S
LEFT ICA DIST DIAS: 37 CM/S
LEFT ICA DIST SYS: 110 CM/S
LEFT ICA MID DIAS: 27 CM/S
LEFT ICA MID SYS: 82 CM/S
LEFT ICA PROX DIAS: 25 CM/S
LEFT ICA PROX SYS: 79 CM/S
LEFT ICA/CCA SYS: 0.8
LEFT VERTEBRAL DIAS: 12 CM/S
LEFT VERTEBRAL SYS: 40 CM/S
RH LEFT CAROTID BULB EDV: 21 CM/S
RH LEFT CAROTID BULB PSV: 81 CM/S
RH RIGHT CAROTID BULB EDV: 22 CM/S
RH RIGHT CAROTID BULB PSV: 138 CM/S
RH RIGHT CAROTID BULB/CCA: 2.19
RIGHT CCA DIST DIAS: 11 CM/S
RIGHT CCA DIST SYS: 63 CM/S
RIGHT CCA MID DIAS: 6 CM/S
RIGHT CCA MID SYS: 27 CM/S
RIGHT CCA PROX DIAS: 7 CM/S
RIGHT CCA PROX SYS: 28 CM/S
RIGHT ECA SYS: 26 CM/S
RIGHT ICA DIST SYS: 0
RIGHT ICA MID SYS: 0
RIGHT ICA PROX SYS: 0
RIGHT VERTEBRAL DIAS: 12 CM/S
RIGHT VERTEBRAL SYS: 64 CM/S

## 2025-08-01 PROCEDURE — 93880 EXTRACRANIAL BILAT STUDY: CPT | Mod: 26 | Performed by: INTERNAL MEDICINE

## 2025-08-01 NOTE — TELEPHONE ENCOUNTER
----- Message from Fanny Baker sent at 8/1/2025  7:03 AM MDT -----  Regarding: Lipid panel  I reviewed results of her recent labs including lipid panel done through [facility]. LDL is significantly elevated above goal at at 120. Goal at least less than 70. Is she certain she's taking Repatha every 2 weeks consistently as directed? If so, I highly recommend starting adjunct therapy with Zetia 10 mg daily to drive down her cholesterol. High risk with peripheral vascular disease, prior stents, and progressive aortic stenosis.

## 2025-08-14 PROCEDURE — 93925 LOWER EXTREMITY STUDY: CPT | Mod: 26 | Performed by: INTERNAL MEDICINE

## 2025-08-14 PROCEDURE — 93978 VASCULAR STUDY: CPT | Mod: 26 | Performed by: INTERNAL MEDICINE

## 2025-08-18 ENCOUNTER — RESULTS FOLLOW-UP (OUTPATIENT)
Dept: CARDIOLOGY | Facility: CLINIC | Age: 85
End: 2025-08-18
Payer: COMMERCIAL

## 2025-08-22 ENCOUNTER — TELEPHONE (OUTPATIENT)
Dept: CARDIOLOGY | Facility: CLINIC | Age: 85
End: 2025-08-22
Payer: COMMERCIAL

## (undated) DEVICE — BALLOON CHARGER 8.0 40 135CM

## (undated) DEVICE — SHEATH PINNACLE 5F 10CM MGW INTRODUCER W MINI GUIDE WIRE

## (undated) DEVICE — EMBOLIC PROTECTION 6 X 320 SPIDERFX

## (undated) DEVICE — BALLOON CHARGER 6.0 40 75CM

## (undated) DEVICE — GUIDE WIRE WHOLEY .035INX260CM INTERMEDIATE MODIFIED J/SHAPEABLE 0005281

## (undated) DEVICE — HAWKONE GUIDE WIRE EXTENSION 6FR 0.014 3.0-7.0MM

## (undated) DEVICE — WIRE GLIDE ANGLED 035 X 260CM WIRE GLIDE

## (undated) DEVICE — SHEATH PINNACLE 6F 10CM MGW @ INTRODUCER W MINI GUIDE WIRE

## (undated) DEVICE — CATH TRAILBLAZER .035 135CM ANGELED

## (undated) DEVICE — WIRE GUIDE .035 260CM 7CM STR AMPLATZ STR TIP SUPER STIFF

## (undated) DEVICE — BALLOON DRUG COATED 6X60X130MM ADMIRAL PACLITAXEL

## (undated) DEVICE — EMBOLIC PROTECTION 5 X 320 SPIDERFX

## (undated) DEVICE — SHEATH DEST 6F 45CM STR CCV DESTINATION

## (undated) DEVICE — Device

## (undated) DEVICE — CATH OMNIFLUSH 5F 65CM

## (undated) DEVICE — CATH 4F VERT 125CM AQUA

## (undated) DEVICE — INTRODUCER MICRO 5F 40CM ANG

## (undated) DEVICE — BALLOON DRUG COATED5X120X130MM ADMIRAL PACLITAXEL